# Patient Record
Sex: MALE | Race: WHITE | NOT HISPANIC OR LATINO | Employment: OTHER | URBAN - METROPOLITAN AREA
[De-identification: names, ages, dates, MRNs, and addresses within clinical notes are randomized per-mention and may not be internally consistent; named-entity substitution may affect disease eponyms.]

---

## 2017-04-25 ENCOUNTER — LAB CONVERSION - ENCOUNTER (OUTPATIENT)
Dept: OTHER | Facility: OTHER | Age: 67
End: 2017-04-25

## 2017-04-25 LAB
CHOLEST SERPL-MCNC: 189 MG/DL (ref 125–200)
CHOLEST/HDLC SERPL: 5.1 (CALC)
GLUCOSE, PLASMA (HISTORICAL): 96 MG/DL (ref 65–99)
HBA1C MFR BLD HPLC: 6.5 % OF TOTAL HGB
HDLC SERPL-MCNC: 37 MG/DL
LDL CHOLESTEROL (HISTORICAL): 107 MG/DL (CALC)
NON-HDL-CHOL (CHOL-HDL) (HISTORICAL): 152 MG/DL (CALC)
PROSTATE SPECIFIC ANTIGEN TOTAL (HISTORICAL): 0.8 NG/ML
TRIGL SERPL-MCNC: 226 MG/DL

## 2017-08-24 ENCOUNTER — ALLSCRIPTS OFFICE VISIT (OUTPATIENT)
Dept: OTHER | Facility: OTHER | Age: 67
End: 2017-08-24

## 2017-08-24 DIAGNOSIS — E11.9 TYPE 2 DIABETES MELLITUS WITHOUT COMPLICATIONS (HCC): ICD-10-CM

## 2017-08-24 DIAGNOSIS — E78.5 HYPERLIPIDEMIA: ICD-10-CM

## 2017-09-06 ENCOUNTER — LAB CONVERSION - ENCOUNTER (OUTPATIENT)
Dept: OTHER | Facility: OTHER | Age: 67
End: 2017-09-06

## 2017-09-06 LAB
GLUCOSE, PLASMA (HISTORICAL): 113 MG/DL (ref 65–99)
HBA1C MFR BLD HPLC: 6.7 % OF TOTAL HGB

## 2017-09-15 ENCOUNTER — GENERIC CONVERSION - ENCOUNTER (OUTPATIENT)
Dept: OTHER | Facility: OTHER | Age: 67
End: 2017-09-15

## 2017-10-06 ENCOUNTER — GENERIC CONVERSION - ENCOUNTER (OUTPATIENT)
Dept: OTHER | Facility: OTHER | Age: 67
End: 2017-10-06

## 2017-11-17 ENCOUNTER — ALLSCRIPTS OFFICE VISIT (OUTPATIENT)
Dept: OTHER | Facility: OTHER | Age: 67
End: 2017-11-17

## 2017-11-17 LAB
BILIRUB UR QL STRIP: NORMAL
CLARITY UR: NORMAL
COLOR UR: YELLOW
GLUCOSE (HISTORICAL): 1000
HGB UR QL STRIP.AUTO: NORMAL
KETONES UR STRIP-MCNC: NORMAL MG/DL
LEUKOCYTE ESTERASE UR QL STRIP: NORMAL
NITRITE UR QL STRIP: NORMAL
PH UR STRIP.AUTO: 6 [PH]
PROT UR STRIP-MCNC: NORMAL MG/DL
SP GR UR STRIP.AUTO: 1.01
UROBILINOGEN UR QL STRIP.AUTO: NORMAL

## 2017-11-18 NOTE — PROGRESS NOTES
Assessment    1  Diabetes (250 00) (E11 9)   2  Benign essential hypertension (401 1) (I10)    Plan  Benign essential hypertension    · Follow-up visit in 2 weeks Evaluation and Treatment  Follow-up  Status: Hold For -Scheduling  Requested for: 96OAU8345  Frequent urination    · Urine Dip Non-Automated- POC; Status:Complete - Retrospective By ProtocolAuthorization;   Done: 72OKU3616 10:45AM    Discussion/Summary    1  Diabetes mellitus type 2 with polydipsia, polyuria, weight loss  Again patient states he has not been following his diet at all and is indulging in a lot of sweets including cake, pies, Candy, ice cream  He was told the importance of reducing his consumption concentrated sweets and simple carbohydrates  We did review his medication and apparently he is not taking his metformin twice a day as previously directed  Patient is also not checking his blood sugars on a regular basis  Instructions are for him to check his blood sugars twice a day in the a m  fasting and again before dinner and record the results  He was told to call if any blood sugars were greater than 300  We discussed modification of his diet and increasing his metformin to twice a day as previously directed  He will return to the office in 2 weeks with a log of his blood sugars so that we can determine whether or not we need to modify his medication  Again he was told to call with any elevated blood sugar readings  Patient and his wife understand the instructions  2  Hypertension  Controlled with present treatment  The patient, patient's family was counseled regarding diagnostic results,-- instructions for management,-- risk factor reductions,-- prognosis,-- patient and family education,-- impressions,-- risks and benefits of treatment options,-- importance of compliance with treatment  Possible side effects of new medications were reviewed with the patient/guardian today  The treatment plan was reviewed with the patient/guardian   The patient/guardian understands and agrees with the treatment plan      Chief Complaint  Patient is here today for frequent urination      History of Present Illness  HPI: Patient is a 21-year-old male with a history of benign essential hypertension, hyperlipidemia, diabetes mellitus type 2  Patient is here today complaining of problems over the past 2 weeks of increased urination, increased thirst  Patient states he is getting up in the middle of the night at least 5-6 times to urinate  He has no dysuria or abdominal pain, back pain, hematuria  He states that his urine is light in color  Also of note he further relates that he has not been watching his diet and he has lost 5 lb over the past few weeks  Emergently we did check his urine in the office today and it showed a large amount of glucose  He is not checking his sugars at home on a regular basis and he states he does not recall what the values are when he checks his glucometer readings  He denies any fever or chills or other systemic problems  Hospital Based Practices Required Assessment:  Pain Assessment  the patient states they do not have pain  Abuse And Domestic Violence Screen   Yes, the patient is safe at home  -- The patient states no one is hurting them  Depression And Suicide Screen  No, the patient has not had thoughts of hurting themself  No, the patient has not felt depressed in the past 7 days  Primary Language is  English  Readiness To Learn: Receptive  Barriers To Learning: none  Preferred Learning: verbal  Education Completed: disease/condition,-- medications-- and-- further treatment/follow-up  Person Taught: patient-- and-- family member   Evaluation Of Learning: verbalized/demonstrated understanding      Review of Systems   Constitutional: feeling tired-- and-- recent 5 lb weight loss since last visit lb weight loss, but-- no fever,-- not feeling poorly,-- no recent weight gain-- and-- no chills    ENT: Dry mouth, but-- no complaints of earache, no loss of hearing, no nosebleeds or nasal discharge, no sore throat or hoarseness  Cardiovascular: no complaints of slow or fast heart rate, no chest pain, no palpitations, no leg claudication or lower extremity edema  Respiratory: no complaints of shortness of breath, no wheezing or cough, no dyspnea on exertion, no orthopnea or PND  Gastrointestinal: no complaints of abdominal pain, no constipation, no nausea or vomiting, no diarrhea or bloody stools  Genitourinary: nocturia-- and-- Urinary frequency, but-- no dysuria,-- no urinary hesitancy,-- no genital lesions,-- no incontinence-- and-- no inadequacy of penile erections  Musculoskeletal: no complaints of arthralgia, no myalgia, no joint swelling or stiffness, no limb pain or swelling  Integumentary: no complaints of skin rash or lesion, no itching or dry skin, no skin wounds  Neurological: no complaints of headache, no confusion, no numbness or tingling, no dizziness or fainting  Active Problems  1  Acute bronchitis (466 0) (J20 9)   2  Anxiety (300 00) (F41 9)   3  Atopic dermatitis (691 8) (L20 9)   4  Atypical chest pain (786 59) (R07 89)   5  Benign essential hypertension (401 1) (I10)   6  Controlled diabetes mellitus (250 00) (E11 9)   7  Diabetes (250 00) (E11 9)   8  Encounter for prostate cancer screening (V76 44) (Z12 5)   9  Hyperlipidemia (272 4) (E78 5)   10  Impacted cerumen of both ears (380 4) (H61 23)   11  Need for immunization against influenza (V04 81) (Z23)   12  Need for pneumococcal vaccine (V03 82) (Z23)   13  Need for prophylactic vaccination and inoculation against influenza (V04 81) (Z23)   14  Osteoarthritis of knee (715 36) (M17 10)   15  Screening for colorectal cancer (V76 51) (Z12 11,Z12 12)    Past Medical History  Active Problems And Past Medical History Reviewed: The active problems and past medical history were reviewed and updated today  Surgical History  Surgical History Reviewed:    The surgical history was reviewed and updated today  Social History     · Never A Smoker  The social history was reviewed and updated today  The social history was reviewed and is unchanged  Family History  Family History Reviewed: The family history was reviewed and updated today  Current Meds   1  Aspirin 81 MG Oral Tablet Chewable; CHEW AND SWALLOW 1 TABLET DAILY; Therapy: 99DEL1556 to Recorded   2  Christina Contour Next Test In [employer]; TEST TWICE DAILY; Therapy: 64NPG7138 to (([phone])  Requested for: 30NJE4948; Last Rx:06Oct2017 Ordered   3  Bisoprolol-Hydrochlorothiazide 5-6 25 MG Oral Tablet; Take 1 tablet daily; Therapy: 79VMR5533 to (Evaluate:17Mar2017)  Requested for: 22Mar2016; Last Rx:22Mar2016 Ordered   4  Citalopram Hydrobromide 10 MG Oral Tablet; Take 1 tablet daily; Therapy: 34Pfe6537 to (Evaluate:69Ddl3175)  Requested for: 56Shw2593; Last Rx:49Hlh5393 Ordered   5  Fenofibrate 145 MG Oral Tablet; Take 1 tablet daily; Therapy: 47Pfh5997 to (Last Claudeen Eb)  Requested for: 01Vma8284 Ordered   6  Glimepiride 2 MG Oral Tablet; TAKE 1 TABLET DAILY AS DIRECTED; Therapy: 37IQT9651 to (Evaluate:51Xfy6921)  Requested for: 12UNH3262; Last Rx:84Gsn1728 Ordered   7  Lancets Miscellaneous; PATIENT TEST TWICE DAILY; Therapy: 37IOT3100 to (Last Rx:06Oct2017)  Requested for: 63KEH3244 Ordered   8  Losartan Potassium 50 MG Oral Tablet; Take 1 tablet daily; Therapy: 51SWI2093 to (Jacqueline Alberts)  Requested for: 52QLE1285; Last Rx:05Jun2017 Ordered   9  MetFORMIN HCl  MG Oral Tablet Extended Release 24 Hour; Take 1 tablet twice a day; Therapy: 14DFK8699 to (Last Rx:19Oct2016)  Requested for: 19Oct2016 Ordered    The medication list was reviewed and updated today  Allergies  1   No Known Drug Allergies    Vitals   Recorded: 17SAF1445 10:34AM   Temperature 98 F   Heart Rate 72   Systolic 856   Diastolic 82   Height 5 ft 11 in   Weight 212 lb 6 oz   BMI Calculated 29 62   BSA Calculated 2 16   O2 Saturation 96       Physical Exam   Constitutional Pleasant, cheerful 72-year-old male who is awake alert no acute distress  Eyes  Conjunctiva and lids: No swelling, erythema, or discharge  Pupils and irises: Equal, round and reactive to light  Ears, Nose, Mouth, and Throat  External inspection of ears and nose: Normal    Otoscopic examination: Tympanic membrance translucent with normal light reflex  Canals patent without erythema  Nasal mucosa, septum, and turbinates: Normal without edema or erythema  Oropharynx: Abnormal  -- Slightly dry oral mucosa  Pulmonary  Respiratory effort: No increased work of breathing or signs of respiratory distress  Auscultation of lungs: Clear to auscultation, equal breath sounds bilaterally, no wheezes, no rales, no rhonci  Cardiovascular  Palpation of heart: Normal PMI, no thrills  Auscultation of heart: Normal rate and rhythm, normal S1 and S2, without murmurs  Examination of extremities for edema and/or varicosities: Normal    Carotid pulses: Normal    Abdomen  Abdomen: Abnormal  -- Mildly obese soft nontender positive bowel sounds x4 quadrants  Liver and spleen: No hepatomegaly or splenomegaly  Lymphatic  Palpation of lymph nodes in neck: No lymphadenopathy  Musculoskeletal  Gait and station: Normal    Digits and nails: Normal without clubbing or cyanosis  Inspection/palpation of joints, bones, and muscles: Normal    Skin  Skin and subcutaneous tissue: Normal without rashes or lesions  Neurologic  Cranial nerves: Cranial nerves 2-12 intact  Reflexes: 2+ and symmetric  Sensation: No sensory loss  Psychiatric  Orientation to person, place and time: Normal    Mood and affect: Abnormal  -- Cheerful, mood today  Diabetic Foot Screen: Normal    Socks and shoes removed, the Right Foot: the foot was normal, no swelling, no erythema  The toes on the right were normal-- and-- with full range of motion  The sensory exam showed   normal vibratory sensation at the level of the toes on the right  Normal tactile sensation with monofilament testing throughout the right foot  Socks and shoes removed, Left Foot: the foot was normal, no swelling, no erythema  The toes on the left were normal-- and-- with full range of motion  The sensory exam showed  normal vibratory sensation at the level of the toes on the left , Normal tactile sensation with monofilament testing throughout the left foot  Pulses:  2+ in the dorsalis pedis on the right  Pulses:  2+ in the dorsalis pedis on the left        Results/Data  Urine Dip Non-Automated- POC 72QXG9532 10:45AM Escobar Cortez     Test Name Result Flag Reference   Color Yellow       Clarity Transparent     Leukocytes -     Nitrite -     Blood -     Bilirubin -     Urobilinogen -     Protein -     Ph 6     Specific Gravity 1 010     Ketone -     Glucose 1000         Future Appointments    Date/Time Provider Specialty Site   11/30/2017 08:00 AM Escobar Cortez DO Internal Medicine [facility] INTERNAL MED   01/15/2018 01:45 PM Escobar Cortez DO Internal Medicine [facility] INTERNAL MED       Signatures   Electronically signed by : Rachel Pearce DO; Nov 17 2017 11:41AM EST                       (Author)

## 2017-11-30 ENCOUNTER — GENERIC CONVERSION - ENCOUNTER (OUTPATIENT)
Dept: OTHER | Facility: OTHER | Age: 67
End: 2017-11-30

## 2017-12-29 ENCOUNTER — GENERIC CONVERSION - ENCOUNTER (OUTPATIENT)
Dept: OTHER | Facility: OTHER | Age: 67
End: 2017-12-29

## 2018-01-12 ENCOUNTER — LAB CONVERSION - ENCOUNTER (OUTPATIENT)
Dept: OTHER | Facility: OTHER | Age: 68
End: 2018-01-12

## 2018-01-12 LAB
GLUCOSE, PLASMA (HISTORICAL): 167 MG/DL (ref 65–99)
HBA1C MFR BLD HPLC: 10.2 % OF TOTAL HGB

## 2018-01-14 VITALS
SYSTOLIC BLOOD PRESSURE: 140 MMHG | TEMPERATURE: 98 F | HEART RATE: 72 BPM | DIASTOLIC BLOOD PRESSURE: 82 MMHG | WEIGHT: 212.38 LBS | HEIGHT: 71 IN | BODY MASS INDEX: 29.73 KG/M2 | OXYGEN SATURATION: 96 %

## 2018-01-15 ENCOUNTER — GENERIC CONVERSION - ENCOUNTER (OUTPATIENT)
Dept: OTHER | Facility: OTHER | Age: 68
End: 2018-01-15

## 2018-01-15 DIAGNOSIS — Z12.5 ENCOUNTER FOR SCREENING FOR MALIGNANT NEOPLASM OF PROSTATE: ICD-10-CM

## 2018-01-15 DIAGNOSIS — E11.9 TYPE 2 DIABETES MELLITUS WITHOUT COMPLICATIONS (HCC): ICD-10-CM

## 2018-01-15 NOTE — PROGRESS NOTES
Assessment    1  Controlled diabetes mellitus (250 00) (E11 9)   2  Benign essential hypertension (401 1) (I10)   3  Hyperlipidemia (272 4) (E78 5)   4  Anxiety (300 00) (F41 9)    Plan  Anxiety    · Citalopram Hydrobromide 10 MG Oral Tablet; Take 1 tablet daily   · Follow-up visit in 3 weeks Evaluation and Treatment  Follow-up  Status: Hold For - Scheduling   Requested for: 45Lbn6771  Controlled diabetes mellitus    · (1) GLUCOSE,  FASTING; Status:Active; Requested for:39Etj0745;    · (1) HEMOGLOBIN A1C; Status:Active; Requested for:31Krj8307;    · *VB - Foot Exam; Status:Active; Requested for:85Ays6776;   Hyperlipidemia    · Fenofibrate 145 MG Oral Tablet; Take 1 tablet daily    Discussion/Summary  Discussion Summary:   #1  Diabetes mellitus type 2  As noted patient since he is retired is working harder on his diet and exercise  He is not lost any significant weight since his last visit  Patient was given a slip to check on a fasting blood sugar, hemoglobin A1c and we will discuss the results when he returns to the office in 3 months  #2  Hypertension  Patient's blood pressures controlled and patient will continue with present medication  #3  Hyperlipidemia  Patient states he's been working very hard on reducing fats and cholesterol in his diet  Patient states he does not want to have a lipid profile performed because with his last blood tests was extremely expensive as far as his co-pay was concerned  We will discuss having these tests done approximately 3-4 months  #4  Anxiety disorder   Patient states since he is retired and is able to spend more time with his wife he is less anxious than in the past  We will discuss with him possibly weaning off the citalopram    Counseling Documentation With Imm: The patient was counseled regarding diagnostic results, instructions for management, risk factor reductions, prognosis, patient and family education, impressions, risks and benefits of treatment options, importance of compliance with treatment  Medication SE Review and Pt Understands Tx: Possible side effects of new medications were reviewed with the patient/guardian today  The treatment plan was reviewed with the patient/guardian  The patient/guardian understands and agrees with the treatment plan      Chief Complaint  Chief Complaint Free Text Note Form: Patient is here today for follow-up   Chief Complaint Chronic Condition [facility]: Patient is here today for follow up of chronic conditions described in HPI  History of Present Illness  HPI: Patient is a 58-year-old male with a history of diabetes mellitus type 2, hypertension, hyperlipidemia  Patient is here today for routine follow-up  Patient states he's enjoying shelter and is getting spend more time with his wife  He states he's been trying hard to watch his diet closely and he's unable to afford the medication was prescribed previously because now he is on a Medicare prescription plan  He has not had his sugar checked in quite a while  He states that since his shelter he is on an regular exercise program and walks on a daily basis  He denies chest pain or pressure and no shortness of breath with exertion  Patient has no new arthritic aches or pains      Review of Systems  Complete-Male:   Constitutional: no fever, not feeling poorly, no recent weight gain, no chills, not feeling tired and no recent weight loss  Eyes: No complaints of eye pain, no red eyes, no discharge from eyes, no itchy eyes  ENT: no complaints of earache, no hearing loss, no nosebleeds, no nasal discharge, no sore throat, no hoarseness  Cardiovascular: No complaints of slow heart rate, no fast heart rate, no chest pain, no palpitations, no leg claudication, no lower extremity  Respiratory: No complaints of shortness of breath, no wheezing, no cough, no SOB on exertion, no orthopnea or PND     Gastrointestinal: No complaints of abdominal pain, no constipation, no nausea or vomiting, no diarrhea or bloody stools  Genitourinary: No complaints of dysuria, no incontinence, no hesitancy, no nocturia, no genital lesion, no testicular pain  Musculoskeletal: No complaints of arthralgia, no myalgias, no joint swelling or stiffness, no limb pain or swelling  Integumentary: No complaints of skin rash or skin lesions, no itching, no skin wound, no dry skin  Neurological: No compliants of headache, no confusion, no convulsions, no numbness or tingling, no dizziness or fainting, no limb weakness, no difficulty walking  Psychiatric: sleep disturbances and He admits to some sleep disturbance secondary to taking care of his wife's medical needs, but not suicidal, no anxiety, no personality change, no depression and no emotional problems  Endocrine: No complaints of proptosis, no hot flashes, no muscle weakness, no erectile dysfunction, no deepening of the voice, no feelings of weakness  Hematologic/Lymphatic: No complaints of swollen glands, no swollen glands in the neck, does not bleed easily, no easy bruising  ROS Reviewed:   ROS reviewed  Active Problems    1  Acute bronchitis (466 0) (J20 9)   2  Anxiety (300 00) (F41 9)   3  Atopic dermatitis (691 8) (L20 9)   4  Benign essential hypertension (401 1) (I10)   5  Controlled diabetes mellitus (250 00) (E11 9)   6  Diabetes (250 00) (E11 9)   7  Encounter for prostate cancer screening (V76 44) (Z12 5)   8  Hyperlipidemia (272 4) (E78 5)   9  Impacted cerumen of both ears (380 4) (H61 23)   10  Need for immunization against influenza (V04 81) (Z23)   11  Need for pneumococcal vaccine (V03 82) (Z23)   12  Need for prophylactic vaccination and inoculation against influenza (V04 81) (Z23)   13  Osteoarthritis of knee (715 36) (M17 10)   14  Screening for colorectal cancer (V76 51) (Z12 11,Z12 12)    Past Medical History    1   Need for prophylactic vaccination and inoculation against influenza (V04 81) (Z23)    Social History    · Never A Smoker    Current Meds   1  Aspirin 81 MG Oral Tablet Chewable; CHEW AND SWALLOW 1 TABLET DAILY; Therapy: 51BEA9913 to Recorded   2  Bisoprolol-Hydrochlorothiazide 5-6 25 MG Oral Tablet; Take 1 tablet daily; Therapy: 63KHJ9738 to (Evaluate:17Mar2017)  Requested for: 22Mar2016; Last Rx:22Mar2016   Ordered   3  Citalopram Hydrobromide 10 MG Oral Tablet; Take 1 tablet daily; Therapy: 66PML6980 to (Evaluate:14Oct2017)  Requested for: 13PJY1882; Last Rx:19Oct2016   Ordered   4  Fenofibrate 145 MG Oral Tablet; Take 1 tablet daily; Therapy: 60MNU0157 to (Evaluate:14Oct2017)  Requested for: 43QDL4253; Last Rx:20Oct2016   Ordered   5  Glimepiride 2 MG Oral Tablet; TAKE 1 TABLET DAILY AS DIRECTED; Therapy: 71GKW8260 to (Evaluate:61Bac2512)  Requested for: 09QCY3499; Last Rx:30Lzh8097 Ordered   6  Losartan Potassium 50 MG Oral Tablet; Take 1 tablet daily; Therapy: 33PWD2869 to (Era )  Requested for: 73ORM2367; Last Rx:05Jun2017   Ordered   7  MetFORMIN HCl  MG Oral Tablet Extended Release 24 Hour; Take 1 tablet twice a day; Therapy: 92GNW0008 to (Last Rx:19Oct2016)  Requested for: 19Oct2016 Ordered    Allergies    1  No Known Drug Allergies    Vitals  Vital Signs    Recorded: 49Gav5402 04:11PM   Temperature 97 3 F   Heart Rate 69   Respiration 16   Systolic 239   Diastolic 80   Height 5 ft 11 in   Weight 215 lb 4 0 oz   BMI Calculated 30 02   BSA Calculated 2 18   O2 Saturation 96     Physical Exam    Constitutional Pleasant, cheerful 52-year-old male who is awake alert no acute distress  Eyes   Conjunctiva and lids: No swelling, erythema, or discharge  Pupils and irises: Equal, round and reactive to light  Ears, Nose, Mouth, and Throat   External inspection of ears and nose: Normal     Otoscopic examination: Tympanic membrance translucent with normal light reflex  Canals patent without erythema  Nasal mucosa, septum, and turbinates: Normal without edema or erythema  Oropharynx: Normal with no erythema, edema, exudate or lesions  Pulmonary   Respiratory effort: No increased work of breathing or signs of respiratory distress  Auscultation of lungs: Clear to auscultation, equal breath sounds bilaterally, no wheezes, no rales, no rhonci  Cardiovascular   Palpation of heart: Normal PMI, no thrills  Auscultation of heart: Normal rate and rhythm, normal S1 and S2, without murmurs  Examination of extremities for edema and/or varicosities: Normal     Carotid pulses: Normal     Abdomen   Abdomen: Abnormal   Mildly obese soft nontender positive bowel sounds x4 quadrants  Liver and spleen: No hepatomegaly or splenomegaly  Lymphatic   Palpation of lymph nodes in neck: No lymphadenopathy  Musculoskeletal   Gait and station: Normal     Digits and nails: Normal without clubbing or cyanosis  Inspection/palpation of joints, bones, and muscles: Normal     Skin   Skin and subcutaneous tissue: Normal without rashes or lesions  Neurologic   Cranial nerves: Cranial nerves 2-12 intact  Reflexes: 2+ and symmetric  Sensation: No sensory loss  Psychiatric   Orientation to person, place and time: Normal     Mood and affect: Abnormal   Cheerful, mood today  Diabetic Foot Screen: Normal     Socks and shoes removed, the Right Foot: the foot was normal, no swelling, no erythema  The toes on the right were normal and with full range of motion  The sensory exam showed  normal vibratory sensation at the level of the toes on the right  Normal tactile sensation with monofilament testing throughout the right foot  Socks and shoes removed, Left Foot: the foot was normal, no swelling, no erythema  The toes on the left were normal and with full range of motion  The sensory exam showed  normal vibratory sensation at the level of the toes on the left , Normal tactile sensation with monofilament testing throughout the left foot     Pulses:   2+ in the dorsalis pedis on the right    Pulses:   2+ in the dorsalis pedis on the left  Health Management  Controlled diabetes mellitus   *VB - Eye Exam; every 1 year; Last 41BWX2808; Next Due: 24OXY0240; Active  Screening for colorectal cancer   COLONOSCOPY; every 10 years; Next Due: 01Vtq0422;  Overdue    Future Appointments    Date/Time Provider Specialty Site   09/15/2017 02:30 PM Malaika Mccann DO Internal Medicine [facility] INTERNAL MED     Signatures   Electronically signed by : Jessie Ambriz DO; Aug 24 2017  5:47PM EST                       (Author)

## 2018-01-22 VITALS
SYSTOLIC BLOOD PRESSURE: 138 MMHG | TEMPERATURE: 97.7 F | BODY MASS INDEX: 30.38 KG/M2 | HEIGHT: 71 IN | HEART RATE: 63 BPM | WEIGHT: 217 LBS | OXYGEN SATURATION: 97 % | DIASTOLIC BLOOD PRESSURE: 92 MMHG

## 2018-01-22 VITALS
SYSTOLIC BLOOD PRESSURE: 136 MMHG | BODY MASS INDEX: 30.29 KG/M2 | DIASTOLIC BLOOD PRESSURE: 82 MMHG | TEMPERATURE: 98.1 F | WEIGHT: 216.38 LBS | HEART RATE: 59 BPM | OXYGEN SATURATION: 97 % | HEIGHT: 71 IN

## 2018-01-22 VITALS
HEART RATE: 60 BPM | OXYGEN SATURATION: 96 % | TEMPERATURE: 96.8 F | SYSTOLIC BLOOD PRESSURE: 122 MMHG | WEIGHT: 210.5 LBS | BODY MASS INDEX: 29.47 KG/M2 | HEIGHT: 71 IN | DIASTOLIC BLOOD PRESSURE: 80 MMHG

## 2018-01-22 VITALS
DIASTOLIC BLOOD PRESSURE: 80 MMHG | RESPIRATION RATE: 16 BRPM | HEIGHT: 71 IN | HEART RATE: 69 BPM | SYSTOLIC BLOOD PRESSURE: 140 MMHG | OXYGEN SATURATION: 96 % | BODY MASS INDEX: 30.14 KG/M2 | WEIGHT: 215.25 LBS | TEMPERATURE: 97.3 F

## 2018-01-24 VITALS
TEMPERATURE: 97.8 F | BODY MASS INDEX: 29.68 KG/M2 | OXYGEN SATURATION: 97 % | WEIGHT: 212 LBS | HEIGHT: 71 IN | SYSTOLIC BLOOD PRESSURE: 152 MMHG | HEART RATE: 65 BPM | DIASTOLIC BLOOD PRESSURE: 94 MMHG

## 2018-01-24 VITALS
TEMPERATURE: 97.5 F | HEART RATE: 62 BPM | WEIGHT: 213 LBS | DIASTOLIC BLOOD PRESSURE: 82 MMHG | SYSTOLIC BLOOD PRESSURE: 136 MMHG | BODY MASS INDEX: 29.82 KG/M2 | HEIGHT: 71 IN | OXYGEN SATURATION: 98 %

## 2018-05-01 LAB
ALBUMIN SERPL-MCNC: 4.3 G/DL (ref 3.6–5.1)
ALBUMIN/CREAT UR: 8 MCG/MG CREAT
ALBUMIN/GLOB SERPL: 1.7 (CALC) (ref 1–2.5)
ALP SERPL-CCNC: 65 U/L (ref 40–115)
ALT SERPL-CCNC: 19 U/L (ref 9–46)
AST SERPL-CCNC: 13 U/L (ref 10–35)
BASOPHILS # BLD AUTO: 51 CELLS/UL (ref 0–200)
BASOPHILS NFR BLD AUTO: 0.8 %
BILIRUB SERPL-MCNC: 0.5 MG/DL (ref 0.2–1.2)
BUN SERPL-MCNC: 15 MG/DL (ref 7–25)
BUN/CREAT SERPL: ABNORMAL (CALC) (ref 6–22)
CALCIUM SERPL-MCNC: 9.3 MG/DL (ref 8.6–10.3)
CHLORIDE SERPL-SCNC: 100 MMOL/L (ref 98–110)
CHOLEST SERPL-MCNC: 229 MG/DL
CHOLEST/HDLC SERPL: 7.4 (CALC)
CO2 SERPL-SCNC: 29 MMOL/L (ref 20–31)
CREAT SERPL-MCNC: 0.98 MG/DL (ref 0.7–1.25)
CREAT UR-MCNC: 240 MG/DL (ref 20–370)
EOSINOPHIL # BLD AUTO: 102 CELLS/UL (ref 15–500)
EOSINOPHIL NFR BLD AUTO: 1.6 %
ERYTHROCYTE [DISTWIDTH] IN BLOOD BY AUTOMATED COUNT: 14.1 % (ref 11–15)
GLOBULIN SER CALC-MCNC: 2.5 G/DL (CALC) (ref 1.9–3.7)
GLUCOSE SERPL-MCNC: 162 MG/DL (ref 65–99)
HBA1C MFR BLD: 8.8 % OF TOTAL HGB
HCT VFR BLD AUTO: 50.9 % (ref 38.5–50)
HDLC SERPL-MCNC: 31 MG/DL
HGB BLD-MCNC: 17 G/DL (ref 13.2–17.1)
LDLC SERPL CALC-MCNC: 139 MG/DL (CALC)
LYMPHOCYTES # BLD AUTO: 1562 CELLS/UL (ref 850–3900)
LYMPHOCYTES NFR BLD AUTO: 24.4 %
MCH RBC QN AUTO: 28.2 PG (ref 27–33)
MCHC RBC AUTO-ENTMCNC: 33.4 G/DL (ref 32–36)
MCV RBC AUTO: 84.6 FL (ref 80–100)
MICROALBUMIN UR-MCNC: 2 MG/DL
MONOCYTES # BLD AUTO: 774 CELLS/UL (ref 200–950)
MONOCYTES NFR BLD AUTO: 12.1 %
NEUTROPHILS # BLD AUTO: 3910 CELLS/UL (ref 1500–7800)
NEUTROPHILS NFR BLD AUTO: 61.1 %
NONHDLC SERPL-MCNC: 198 MG/DL (CALC)
PLATELET # BLD AUTO: 144 THOUSAND/UL (ref 140–400)
PMV BLD REES-ECKER: 10.6 FL (ref 7.5–12.5)
POTASSIUM SERPL-SCNC: 4.5 MMOL/L (ref 3.5–5.3)
PROT SERPL-MCNC: 6.8 G/DL (ref 6.1–8.1)
PSA SERPL-MCNC: 0.7 NG/ML
RBC # BLD AUTO: 6.02 MILLION/UL (ref 4.2–5.8)
SL AMB EGFR AFRICAN AMERICAN: 93 ML/MIN/1.73M2
SL AMB EGFR NON AFRICAN AMERICAN: 80 ML/MIN/1.73M2
SODIUM SERPL-SCNC: 137 MMOL/L (ref 135–146)
TRIGL SERPL-MCNC: 384 MG/DL
TSH SERPL-ACNC: 1.07 MIU/L (ref 0.4–4.5)
WBC # BLD AUTO: 6.4 THOUSAND/UL (ref 3.8–10.8)

## 2018-05-22 ENCOUNTER — OFFICE VISIT (OUTPATIENT)
Dept: INTERNAL MEDICINE CLINIC | Facility: CLINIC | Age: 68
End: 2018-05-22
Payer: MEDICARE

## 2018-05-22 VITALS
OXYGEN SATURATION: 98 % | BODY MASS INDEX: 31.08 KG/M2 | TEMPERATURE: 98.5 F | HEART RATE: 68 BPM | HEIGHT: 71 IN | DIASTOLIC BLOOD PRESSURE: 74 MMHG | SYSTOLIC BLOOD PRESSURE: 132 MMHG | WEIGHT: 222 LBS

## 2018-05-22 DIAGNOSIS — I10 ESSENTIAL HYPERTENSION: ICD-10-CM

## 2018-05-22 DIAGNOSIS — F41.9 ANXIETY: ICD-10-CM

## 2018-05-22 DIAGNOSIS — E11.9 TYPE 2 DIABETES MELLITUS WITHOUT COMPLICATION, WITHOUT LONG-TERM CURRENT USE OF INSULIN (HCC): Primary | ICD-10-CM

## 2018-05-22 DIAGNOSIS — E78.01 FAMILIAL HYPERCHOLESTEROLEMIA: ICD-10-CM

## 2018-05-22 DIAGNOSIS — Z00.00 MEDICARE ANNUAL WELLNESS VISIT, SUBSEQUENT: ICD-10-CM

## 2018-05-22 PROCEDURE — G0439 PPPS, SUBSEQ VISIT: HCPCS | Performed by: INTERNAL MEDICINE

## 2018-05-22 RX ORDER — CITALOPRAM 10 MG/1
10 TABLET ORAL DAILY
Qty: 90 TABLET | Refills: 3 | Status: SHIPPED | OUTPATIENT
Start: 2018-05-22 | End: 2018-08-29 | Stop reason: SDUPTHER

## 2018-05-22 RX ORDER — LOSARTAN POTASSIUM 50 MG/1
50 TABLET ORAL DAILY
Refills: 2 | COMMUNITY
Start: 2018-02-25 | End: 2018-08-29 | Stop reason: SDUPTHER

## 2018-05-22 RX ORDER — FENOFIBRATE 145 MG/1
145 TABLET, COATED ORAL DAILY
Qty: 90 TABLET | Refills: 3 | Status: SHIPPED | OUTPATIENT
Start: 2018-05-22 | End: 2019-06-18 | Stop reason: SDUPTHER

## 2018-05-22 RX ORDER — PIOGLITAZONEHYDROCHLORIDE 15 MG/1
15 TABLET ORAL DAILY
Qty: 30 TABLET | Refills: 3 | Status: SHIPPED | OUTPATIENT
Start: 2018-05-22 | End: 2018-08-29

## 2018-05-22 RX ORDER — LOSARTAN POTASSIUM 50 MG/1
50 TABLET ORAL DAILY
Qty: 90 TABLET | Refills: 3 | Status: SHIPPED | OUTPATIENT
Start: 2018-05-22 | End: 2018-08-29 | Stop reason: SDUPTHER

## 2018-05-22 RX ORDER — FENOFIBRATE 145 MG/1
1 TABLET, COATED ORAL DAILY
COMMUNITY
Start: 2017-08-24 | End: 2018-08-29 | Stop reason: SDUPTHER

## 2018-05-22 RX ORDER — ASPIRIN 81 MG/1
1 TABLET, CHEWABLE ORAL DAILY
COMMUNITY
Start: 2012-12-06

## 2018-05-22 RX ORDER — CITALOPRAM 10 MG/1
10 TABLET ORAL DAILY
Refills: 3 | COMMUNITY
Start: 2018-04-03 | End: 2018-08-29 | Stop reason: SDUPTHER

## 2018-05-22 RX ORDER — BISOPROLOL FUMARATE AND HYDROCHLOROTHIAZIDE 5; 6.25 MG/1; MG/1
1 TABLET ORAL DAILY
Qty: 90 TABLET | Refills: 3 | Status: SHIPPED | OUTPATIENT
Start: 2018-05-22 | End: 2018-08-29 | Stop reason: SDUPTHER

## 2018-05-22 RX ORDER — METFORMIN HYDROCHLORIDE 500 MG/1
500 TABLET, EXTENDED RELEASE ORAL 2 TIMES DAILY WITH MEALS
Qty: 180 TABLET | Refills: 3 | Status: SHIPPED | OUTPATIENT
Start: 2018-05-22 | End: 2019-08-19 | Stop reason: SDUPTHER

## 2018-05-22 RX ORDER — METFORMIN HYDROCHLORIDE 500 MG/1
500 TABLET, EXTENDED RELEASE ORAL 2 TIMES DAILY
Refills: 3 | COMMUNITY
Start: 2018-04-16 | End: 2018-08-29 | Stop reason: SDUPTHER

## 2018-05-22 RX ORDER — LANCETS 30 GAUGE
EACH MISCELLANEOUS 2 TIMES DAILY
COMMUNITY
Start: 2017-10-06

## 2018-05-22 RX ORDER — BISOPROLOL FUMARATE AND HYDROCHLOROTHIAZIDE 5; 6.25 MG/1; MG/1
1 TABLET ORAL DAILY
Refills: 3 | COMMUNITY
Start: 2018-03-16 | End: 2019-06-18 | Stop reason: SDUPTHER

## 2018-05-22 NOTE — ASSESSMENT & PLAN NOTE
Healthcare maintenance  Patient is here today for Medicare wellness visit  Patient has a history of multiple medical problems as outlined previously including hypertension, hyperlipidemia, anxiety disorder, diabetes mellitus type 2  He did have labs performed prior to the visit today we did discuss the results  His sugar showing better control with his hemoglobin A1c is now down to 8 8 and patient is compliant with his medication but not with his diet  Patient blood pressure is under good control and his kidney and liver function are normal   We did discuss at length with patient today the need to work harder on his diet and weight control  He is up-to-date with routine screening including colon rectal examination, prostate screening  He will be returning to the office in approximately 3 months to recheck his blood sugar and hemoglobin A1c and further modification of medication as needed

## 2018-05-22 NOTE — ASSESSMENT & PLAN NOTE
Hypertension  Patient's blood pressure is controlled and patient will continue present medication    His renal function shows no compromise this time

## 2018-05-22 NOTE — ASSESSMENT & PLAN NOTE
Anxiety disorder  Patient states he still under a lot of stress with his wife's chronic illnesses    Patient will continue present treatment and he was told if any new emotional issues or problems to please call and we can adjust medication as needed

## 2018-05-22 NOTE — ASSESSMENT & PLAN NOTE
Diabetes  As noted patient's hemoglobin A1c and sugar control is not to goal   In order to improve his blood sugars we have added Actos 15 mg a day along with his metformin 500 mg twice a day with food  Again we stressed to the patient the importance of diet and limiting his intake of simple carbohydrates and concentrated sweets  We will check a fasting blood sugar and hemoglobin A1c in 3 months    Patient had a diabetic foot exam performed today and is up-to-date diabetic eye exam   His microalbumin was negative

## 2018-05-22 NOTE — PROGRESS NOTES
Diabetic Foot Exam    Patient's shoes and socks removed  Right Foot/Ankle   Right Foot Inspection  Skin Exam: skin normal and skin intact no dry skin, no warmth, no callus, no erythema, no maceration, no abnormal color, no pre-ulcer, no ulcer and no callus                          Toe Exam: ROM and strength within normal limitsno swelling, no tenderness, erythema and  no right toe deformity  Sensory   Vibration: intact  Proprioception: intact   Monofilament testing: intact      Left Foot/Ankle  Left Foot Inspection  Skin Exam: skin normal and skin intactno dry skin, no warmth, no erythema, no maceration, normal color, no pre-ulcer, no ulcer and no callus                         Toe Exam: ROM and strength within normal limitsno swelling, no tenderness, no erythema and no left toe deformity                   Sensory   Vibration: intact  Proprioception: intact  Monofilament: intact    Assign Risk Category:  No deformity present; No loss of protective sensation;  No weak pulses       Risk: 0

## 2018-05-22 NOTE — ASSESSMENT & PLAN NOTE
Hyperlipidemia  Patient is intolerant to statins and he is on Tricor 145 mg per day for control of his cholesterol  He did have a recent lipid profile performed showing adequate control    Patient will continue present medication and he was told he needs to watch his intake fats and cholesterol

## 2018-05-22 NOTE — PROGRESS NOTES
HPI:  Sandor Alexander  is a 78 y o  male here for his Subsequent Wellness Visit  There is no problem list on file for this patient  No past medical history on file  No past surgical history on file  No family history on file  History   Smoking Status    Never Smoker   Smokeless Tobacco    Never Used     History   Alcohol use Not on file      History   Drug use: Unknown     /74   Pulse 68   Temp 98 5 °F (36 9 °C)   Ht 5' 11" (1 803 m)   Wt 101 kg (222 lb)   SpO2 98%   BMI 30 96 kg/m²       Current Outpatient Prescriptions   Medication Sig Dispense Refill    aspirin 81 mg chewable tablet Chew 1 tablet daily      fenofibrate (TRICOR) 145 mg tablet Take 1 tablet by mouth daily      Lancets MISC by Does not apply route 2 (two) times a day      bisoprolol-hydrochlorothiazide (ZIAC) 5-6 25 MG per tablet Take 1 tablet by mouth daily  3    citalopram (CeleXA) 10 mg tablet Take 10 mg by mouth daily  3    losartan (COZAAR) 50 mg tablet Take 50 mg by mouth daily  2    metFORMIN (GLUCOPHAGE-XR) 500 mg 24 hr tablet 500 mg 2 (two) times a day  3     No current facility-administered medications for this visit        No Known Allergies  Immunization History   Administered Date(s) Administered    Influenza Quadrivalent Preservative Free 3 years and older IM 09/29/2014    Influenza Quadrivalent, 6-35 Months IM 09/18/2015    Influenza Split High Dose Preservative Free IM 10/19/2016, 09/15/2017    Influenza TIV (IM) 09/20/2012, 09/24/2013    Pneumococcal Conjugate 13-Valent 10/19/2016       Patient Care Team:  Griselda Villanueva DO as PCP - Gregoria Feliciano MD    Medicare Screening Tests and Risk Assessments:  AWV Clinical     ISAR:   Previous hospitalizations?:  Yes   How many hospitalizations have you had in the last year?:  1-2       Once in a Lifetime Medicare Screening:       Medicare Screening Tests and Risk Assessment:   AAA Risk Assessment    Osteoporosis Risk Assessment    HIV Risk Assessment        Drug and Alcohol Use:   Tobacco use    Cigarettes:  never smoker    Smokeless:  never used smokeless tobacco    Tobacco use duration    Tobacco Cessation Readiness    Alcohol use    Alcohol use:  occasional use    Alcohol Treatment Readiness   Illicit Drug Use    Drug use:  never        Diet & Exercise:   Diet   What is your diet?:  Regular   How many servings a day of the following:   Fruits and Vegetables:  1-2 Meat:  1-2   Whole Grains:  0, 1 Simple Carbs:  1   Dairy:  0, 1 Soda:  0   Coffee:  1 Tea:  0   Exercise    Do you currently exercise?:  currently not exercising       Cognitive Impairment Screening:   Depression screening preformed:  Yes Depression screen score:  0   Cognitive Impairment Screening    Do you have difficulty learning or retaining new information?:  No Do you have difficulty handling new tasks?:  No   Do you have difficulty with reasoning?:  No Do you have difficulty with spatial ability and orientation?:  No   Do you have difficulty with language?:  No Do you have difficulty with behavior?:  No       Functional Ability/Level of Safety:   Hearing    Hearing difficulties:  No    Hearing aid:  No    Hearing Impairment Assessment    Hearing status:  No impairment   Current Activities    Status:  unlimited driving, unlimited social activities, unlimited IADL's, unlimited ADL's   Help needed with the folllowing:    Using the phone:  No Transportation:  No   Shopping:  No Preparing Meals:  No   Doing Housework:  No Doing Laundry:  No   Managing Medications:  No Managing Money:  No   ADL    Feeding:  Independant   Oral hygiene and Facial grooming:  Independant   Bathing:  Independant   Upper Body Dressing:  Independant   Lower Body Dressing:  Independant   Toileting:  Independant   Bed Mobility:  Independant   Fall Risk   Have you fallen in the last 12 months?:  No Are you unsteady on your feet?:  No    Are you taking any medications that may cause fatigue or dizziness?:  No Do you have any chronic conditions that may contribute to a fall?:  Diabetes Do you rush to the bathroom potentially risking a fall?:  No   Injury History       Home Safety:   Are there hazards in your environment?:  No   If you fell, would you need help to get back up from the ground?:  Yes Do you have problems or concerns getting in/out of a bed, chair, tub, or toilet?:  No   Do you feel unsteady when walking?:  No Is your activity limited by pain?:  No   Do you have handrails and grab-bars in the home?:  Yes    Are you and/or family members aware of the dangers of smoking in bed?:  Yes Are firearms stored securely?:  Yes   Do you have working smoke alarms and fire extinguisher?:  Yes Do all household members know how to use them?:  Yes   Have you left the stove on unsupervised?:  Yes    Home Safety Risk Factors   Unfamilar with surroundings:  No Uneven floors:  No   Stairs or handrail saftey risk:  No Loose rugs:  No   Household clutter:  No Poor household lighting:  No   No grab bars in bathroom:  No Further evaluation needed:  No       Advanced Directives:   Advanced Directives    Living Will:  No Durable POA for healthcare:  No   Advanced directive:  No    Patient's End of Life Decisions        Urinary Incontinence:   Do you have urinary incontinence?:  No Do you have incomplete emptying?:  No   Do you urinate frequently?:  No Do you have urinary urgency?:  No   Do you have urinary hesitancy?:  No Do you have dysuria (painful and/or difficult urination)?:  No   Do you have nocturia (waking up to urinate)?:  No Do you strain when urinating (have to push to urinate)?:  No   Do you have a weak stream when urinating?:  No Do you have intermittent streaming when urinating?:  No       Glaucoma:            Provider Screening     Preventative Screening/Counseling:   Cardiovascular Screening/Counseling:   (Labs Q5 years, EKG optional one-time)   General:  Risks and Benefits Discussed, Screening Current Counseling: Healthy Diet, Healthy Weight, Improve Cholesterol, Improve Blood Pressure, Improve Exercise Tolerance          Diabetes Screening/Counseling:   (2 tests/year if Pre-Diabetes or 1 test/year if no Diabetes)   General:  Risks and Benefits Discussed, Screening Current Counseling:  Healthy Diet, Healthy Weight, Improve Physical Activity          Colorectal Cancer Screening/Counseling:   (FOBT Q1 yr; Flex Sig Q4 yrs or Q10 yrs after Screening Colonoscopy; Screening Colonoscpy Q2 yrs High Risk or Q10 yrs Low Risk; Barium Enema Q2 yrs High Risk or Q4 yrs Low Risk)   General:  Risks and Benefits Discussed, Screening Current           Prostate Cancer Screening/Counseling:   (Annual)    General:  Risks and Benefits Discussed, Screening Current          Breast Cancer Screening/Counseling:   (Baseline Age 28 - 43; Annual Age 36+)         Cervical Cancer Screening/Counseling:   (Annual for High Risk or Childbearing Age with Abnormal Pap in Last 3 yrs; Every 2 all others)         Osteoporosis Screening/Counseling:   (Every 2 Yrs if at risk or more if medically necessary)   General:  Risks and Benefits Discussed, Screening Not Indicated           AAA Screening/Counseling:   (Once per Lifetime with risk factors)    General:  Screening Not Indicated           Glaucoma Screening/Counseling:   (Annual)   General:  Risks and Benefits Discussed, Screening Current          HIV Screening/Counseling:   (Voluntary; Once annually for high risk OR 3 times for Pregnancy at diagnosis of IUP; 3rd trimester; and at Labor   General:  Screening Not Indicated           Hepatitis C Screening:             Immunizations:   Influenza (annual): Influenza UTD This Year   Pneumococcal (Once in a Lifetime):  Lifetime Vaccine Completed   Hepatitis B Series (low risk patients):  Series Not Indicated   Zostavax (Medicare D Coverage, Pt >70 yo):  Vaccine Status Unknown   Tdap (Non-Medicare Wellness Visit required):   Tdap Vaccine UTD       Other Preventative Couseling (Non-Medicare Wellness Visit Required):   nutrition counseling performed, alcohol use counseling provided, sunscreen education provided, weight reduction was discussed, Increased physical activity counseling given       Referrals (Non-Medicare Wellness Visit Required):       Medical Equipment/Suppliers:           No exam data present    Physical Exam :  Physical Exam   Constitutional: He is oriented to person, place, and time  He appears well-developed and well-nourished  No distress  Pleasant, mildly overweight 52-year-old male who is awake alert no acute distress and oriented x3   HENT:   Head: Normocephalic and atraumatic  Right Ear: External ear normal    Left Ear: External ear normal    Nose: Nose normal    Mouth/Throat: Oropharynx is clear and moist  No oropharyngeal exudate  Eyes: Conjunctivae and EOM are normal  Pupils are equal, round, and reactive to light  Right eye exhibits no discharge  Left eye exhibits no discharge  No scleral icterus  Neck: Normal range of motion  Neck supple  No JVD present  No tracheal deviation present  No thyromegaly present  Cardiovascular: Normal rate, regular rhythm, normal heart sounds and intact distal pulses  Exam reveals no gallop and no friction rub  No murmur heard  Pulmonary/Chest: Effort normal and breath sounds normal  No stridor  No respiratory distress  He has no wheezes  He has no rales  He exhibits no tenderness  Abdominal: Soft  Bowel sounds are normal  He exhibits no distension and no mass  There is no tenderness  There is no rebound and no guarding  No hernia  Genitourinary: Rectum normal, prostate normal and penis normal  Rectal exam shows guaiac negative stool  No penile tenderness  Musculoskeletal: He exhibits no edema, tenderness or deformity  Lymphadenopathy:     He has no cervical adenopathy  Neurological: He is alert and oriented to person, place, and time  He displays normal reflexes  No cranial nerve deficit   He exhibits normal muscle tone  Coordination normal    Skin: Capillary refill takes less than 2 seconds  No rash noted  He is not diaphoretic  No erythema  No pallor  Psychiatric: He has a normal mood and affect  His behavior is normal  Judgment and thought content normal    Nursing note and vitals reviewed  Reviewed Updated [facility] Prior Wellness Visits:   Last Medicare wellness visit information was reviewed, patient interviewed , no change since last AWVyes  Last Medicare wellness visit information was reviewed, patient interviewed and updates made to the record today yes    Assessment and Plan:  1   Medicare annual wellness visit, subsequent         Health Maintenance Due   Topic Date Due    Hepatitis C Screening  1950    COLONOSCOPY  1950    Depression Screening PHQ-9  06/17/1963    DTaP,Tdap,and Td Vaccines (1 - Tdap) 06/17/1972    PNEUMOCOCCAL POLYSACCHARIDE VACCINE AGE 72 AND OVER  06/17/2016    Diabetic Foot Exam  04/19/2017    OPHTHALMOLOGY EXAM  11/08/2017

## 2018-06-12 DIAGNOSIS — E11.9 TYPE 2 DIABETES MELLITUS WITHOUT COMPLICATION, WITHOUT LONG-TERM CURRENT USE OF INSULIN (HCC): Primary | ICD-10-CM

## 2018-06-12 RX ORDER — GLIMEPIRIDE 1 MG/1
TABLET ORAL
Qty: 30 TABLET | Refills: 5 | Status: SHIPPED | OUTPATIENT
Start: 2018-06-12 | End: 2019-07-10

## 2018-08-21 LAB
EST. AVERAGE GLUCOSE BLD GHB EST-MCNC: 275 (CALC)
EST. AVERAGE GLUCOSE BLD GHB EST-SCNC: 15.2 (CALC)
GLUCOSE P FAST SERPL-MCNC: 249 MG/DL (ref 65–99)
HBA1C MFR BLD: 11.2 % OF TOTAL HGB

## 2018-08-29 ENCOUNTER — OFFICE VISIT (OUTPATIENT)
Dept: INTERNAL MEDICINE CLINIC | Facility: CLINIC | Age: 68
End: 2018-08-29
Payer: MEDICARE

## 2018-08-29 VITALS
WEIGHT: 215 LBS | HEART RATE: 65 BPM | HEIGHT: 71 IN | OXYGEN SATURATION: 96 % | SYSTOLIC BLOOD PRESSURE: 132 MMHG | TEMPERATURE: 98.4 F | DIASTOLIC BLOOD PRESSURE: 74 MMHG | BODY MASS INDEX: 30.1 KG/M2

## 2018-08-29 DIAGNOSIS — I10 ESSENTIAL HYPERTENSION: ICD-10-CM

## 2018-08-29 DIAGNOSIS — I10 BENIGN ESSENTIAL HYPERTENSION: ICD-10-CM

## 2018-08-29 DIAGNOSIS — E78.01 FAMILIAL HYPERCHOLESTEROLEMIA: ICD-10-CM

## 2018-08-29 DIAGNOSIS — E11.9 TYPE 2 DIABETES MELLITUS WITHOUT COMPLICATION, WITHOUT LONG-TERM CURRENT USE OF INSULIN (HCC): ICD-10-CM

## 2018-08-29 DIAGNOSIS — F41.9 ANXIETY: Primary | ICD-10-CM

## 2018-08-29 PROCEDURE — 99214 OFFICE O/P EST MOD 30 MIN: CPT | Performed by: INTERNAL MEDICINE

## 2018-08-29 RX ORDER — LOSARTAN POTASSIUM 50 MG/1
50 TABLET ORAL DAILY
Qty: 90 TABLET | Refills: 3 | Status: SHIPPED | OUTPATIENT
Start: 2018-08-29 | End: 2019-11-26 | Stop reason: SDUPTHER

## 2018-08-29 RX ORDER — CITALOPRAM 10 MG/1
10 TABLET ORAL DAILY
Qty: 90 TABLET | Refills: 3 | Status: SHIPPED | OUTPATIENT
Start: 2018-08-29 | End: 2019-08-28 | Stop reason: SDUPTHER

## 2018-08-29 RX ORDER — GLIMEPIRIDE 2 MG/1
2 TABLET ORAL
Qty: 90 TABLET | Refills: 3 | Status: SHIPPED | OUTPATIENT
Start: 2018-08-29 | End: 2019-07-10

## 2018-08-29 NOTE — ASSESSMENT & PLAN NOTE
Patient has a long-term history of anxiety disorder and remains on Celexa 10 milligrams a day  He states the medication has been helpful especially dealing with his wife and her multiple medical problems  Patient was told if any problems with increasing anxiety or depression to please call    He has no suicidal thoughts or ideation

## 2018-08-29 NOTE — ASSESSMENT & PLAN NOTE
Because of an adverse reaction to the patient being on a statin in the past patient is on fenofibrate had 145 milligrams daily  Patient is given a slip to check on his cholesterol level with his next visit    We again we reinforced to with the patient the importance of watching his intake of fats and cholesterol

## 2018-08-29 NOTE — ASSESSMENT & PLAN NOTE
Lab Results   Component Value Date    HGBA1C 11 2 (H) 08/20/2018       No results for input(s): POCGLU in the last 72 hours  Blood Sugar Average: Last 72 hrs:   patient has longstanding history of diabetes mellitus type 2  We have been having trouble over the years with medication with the patient having both episodes of hyper and hypoglycemia  With his last visit he was placed on pioglitazone and we had given the patient a trial of the medication  He states after a few weeks he gave up taking the medication because he was breaking out in sweats after he took the medication  We are not sure whether this is secondary to hypoglycemia or an adverse reaction to the medication  Patient's sugar level is showing poor control with a hemoglobin A1c of 11 2  Patient admits that he is not always perfect with the diet  Alternatives to treatment at this point time would be to increase the Amaryl to 2 milligrams daily  Patient did have episodes in the past of hypoglycemia and he was told the importance of talking to us at the office immediately if any adverse effects from the increased dose of his Amaryl such as sweating, lightheadedness, dizziness so that we could make adjustments with his treatment as needed  Patient understands and agrees and we will check a fasting blood sugar, hemoglobin A1c with his next visit    Patient is also due for diabetic eye exam and we will make arrangements to have a copy of the consult to be performed by his ophthalmologist

## 2018-08-29 NOTE — ASSESSMENT & PLAN NOTE
Patient is had consistent control of his blood pressure with present medication  Patient has had no ill effects from the drug  With his next visit his blood pressure will be checked again and we will make adjustments to medication as needed  Patient also will have lab testing to check on his kidney function with his next visit

## 2018-08-29 NOTE — PROGRESS NOTES
Diabetic Foot Exam    Patient's shoes and socks removed  Right Foot/Ankle   Right Foot Inspection  Skin Exam: skin normal and skin intact no dry skin, no warmth, no callus, no erythema, no maceration, no abnormal color, no pre-ulcer, no ulcer and no callus                          Toe Exam: ROM and strength within normal limitsno swelling, no tenderness, erythema and  no right toe deformity  Sensory   Vibration: intact  Proprioception: intact   Monofilament testing: intact  Vascular  Capillary refills: < 3 seconds  The right DP pulse is 2+  The right PT pulse is 2+  Left Foot/Ankle  Left Foot Inspection  Skin Exam: skin normal and skin intactno dry skin, no warmth, no erythema, no maceration, normal color, no pre-ulcer, no ulcer and no callus                         Toe Exam: ROM and strength within normal limitsno swelling, no tenderness, no erythema and no left toe deformity                   Sensory   Vibration: intact  Proprioception: intact  Monofilament: intact  Vascular  Capillary refills: < 3 seconds  The left DP pulse is 2+  The left PT pulse is 2+  Assign Risk Category:  No deformity present; No loss of protective sensation;  No weak pulses       Risk: 0

## 2018-08-29 NOTE — PROGRESS NOTES
Assessment/Plan:    Diabetes ([facility] [address] )  Lab Results   Component Value Date    HGBA1C 11 2 (H) 08/20/2018       No results for input(s): POCGLU in the last 72 hours  Blood Sugar Average: Last 72 hrs:   patient has longstanding history of diabetes mellitus type 2  We have been having trouble over the years with medication with the patient having both episodes of hyper and hypoglycemia  With his last visit he was placed on pioglitazone and we had given the patient a trial of the medication  He states after a few weeks he gave up taking the medication because he was breaking out in sweats after he took the medication  We are not sure whether this is secondary to hypoglycemia or an adverse reaction to the medication  Patient's sugar level is showing poor control with a hemoglobin A1c of 11 2  Patient admits that he is not always perfect with the diet  Alternatives to treatment at this point time would be to increase the Amaryl to 2 milligrams daily  Patient did have episodes in the past of hypoglycemia and he was told the importance of talking to us at the office immediately if any adverse effects from the increased dose of his Amaryl such as sweating, lightheadedness, dizziness so that we could make adjustments with his treatment as needed  Patient understands and agrees and we will check a fasting blood sugar, hemoglobin A1c with his next visit  Patient is also due for diabetic eye exam and we will make arrangements to have a copy of the consult to be performed by his ophthalmologist    Benign essential hypertension  Patient is had consistent control of his blood pressure with present medication  Patient has had no ill effects from the drug  With his next visit his blood pressure will be checked again and we will make adjustments to medication as needed  Patient also will have lab testing to check on his kidney function with his next visit      Hyperlipidemia  Because of an adverse reaction to the patient being on a statin in the past patient is on fenofibrate had 145 milligrams daily  Patient is given a slip to check on his cholesterol level with his next visit  We again we reinforced to with the patient the importance of watching his intake of fats and cholesterol    Anxiety  Patient has a long-term history of anxiety disorder and remains on Celexa 10 milligrams a day  He states the medication has been helpful especially dealing with his wife and her multiple medical problems  Patient was told if any problems with increasing anxiety or depression to please call  He has no suicidal thoughts or ideation       Diagnoses and all orders for this visit:    Anxiety  -     citalopram (CeleXA) 10 mg tablet; Take 1 tablet (10 mg total) by mouth daily    Essential hypertension  -     losartan (COZAAR) 50 mg tablet; Take 1 tablet (50 mg total) by mouth daily  -     Basic metabolic panel; Future  -     Hemoglobin A1C; Future    Type 2 diabetes mellitus without complication, without long-term current use of insulin (HCC)  -     Basic metabolic panel; Future  -     Hemoglobin A1C; Future  -     glimepiride (AMARYL) 2 mg tablet; Take 1 tablet (2 mg total) by mouth daily with breakfast    Benign essential hypertension    Familial hypercholesterolemia          Subjective:      Patient ID: John Head  is a 80 y o  male  Patient is a pleasant 56-year-old male with a history of multiple medical problems including diabetes mellitus type 2, anxiety disorder, hypertension, hyperlipidemia  Patient is here today for a routine follow-up  He did have labs performed prior to being seen today we did discuss the results which show that he showing poor control of his blood sugars again    He admits that he stopped his medication because he was having some episodes, side effects from the pioglitazone        The following portions of the patient's history were reviewed and updated as appropriate:   He  has no past medical history on file  He   Patient Active Problem List    Diagnosis Date Noted   [facility] maintenance 05/22/2018    Diabetes ([address] ) 08/04/2014    Anxiety 09/20/2012    Benign essential hypertension 09/20/2012    Hyperlipidemia 09/20/2012     He  has no past surgical history on file  His family history is not on file  He  reports that he has never smoked  He has never used smokeless tobacco  His alcohol and drug histories are not on file  Current Outpatient Prescriptions   Medication Sig Dispense Refill    aspirin 81 mg chewable tablet Chew 1 tablet daily      bisoprolol-hydrochlorothiazide (ZIAC) 5-6 25 MG per tablet Take 1 tablet by mouth daily  3    citalopram (CeleXA) 10 mg tablet Take 1 tablet (10 mg total) by mouth daily 90 tablet 3    fenofibrate (TRICOR) 145 mg tablet Take 1 tablet (145 mg total) by mouth daily 90 tablet 3    glimepiride (AMARYL) 1 mg tablet TAKE ONE TABLET BY MOUTH EVERY DAY 30 tablet 5    Lancets MISC by Does not apply route 2 (two) times a day      losartan (COZAAR) 50 mg tablet Take 1 tablet (50 mg total) by mouth daily 90 tablet 3    metFORMIN (GLUCOPHAGE-XR) 500 mg 24 hr tablet Take 1 tablet (500 mg total) by mouth 2 (two) times a day with meals 180 tablet 3    glimepiride (AMARYL) 2 mg tablet Take 1 tablet (2 mg total) by mouth daily with breakfast 90 tablet 3     No current facility-administered medications for this visit        Current Outpatient Prescriptions on File Prior to Visit   Medication Sig    aspirin 81 mg chewable tablet Chew 1 tablet daily    bisoprolol-hydrochlorothiazide (ZIAC) 5-6 25 MG per tablet Take 1 tablet by mouth daily    fenofibrate (TRICOR) 145 mg tablet Take 1 tablet (145 mg total) by mouth daily    glimepiride (AMARYL) 1 mg tablet TAKE ONE TABLET BY MOUTH EVERY DAY    Lancets MISC by Does not apply route 2 (two) times a day    metFORMIN (GLUCOPHAGE-XR) 500 mg 24 hr tablet Take 1 tablet (500 mg total) by mouth 2 (two) times a day with meals    [DISCONTINUED] citalopram (CeleXA) 10 mg tablet Take 10 mg by mouth daily    [DISCONTINUED] losartan (COZAAR) 50 mg tablet Take 50 mg by mouth daily    [DISCONTINUED] pioglitazone (ACTOS) 15 mg tablet Take 1 tablet (15 mg total) by mouth daily for 120 days    [DISCONTINUED] bisoprolol-hydrochlorothiazide (ZIAC) 5-6 25 MG per tablet Take 1 tablet by mouth daily for 90 days    [DISCONTINUED] citalopram (CeleXA) 10 mg tablet Take 1 tablet (10 mg total) by mouth daily for 90 days    [DISCONTINUED] fenofibrate (TRICOR) 145 mg tablet Take 1 tablet by mouth daily    [DISCONTINUED] losartan (COZAAR) 50 mg tablet Take 1 tablet (50 mg total) by mouth daily for 90 days    [DISCONTINUED] metFORMIN (GLUCOPHAGE-XR) 500 mg 24 hr tablet 500 mg 2 (two) times a day     No current facility-administered medications on file prior to visit  He has No Known Allergies       Review of Systems   Constitutional: Negative  HENT: Negative  Eyes: Negative  Respiratory: Negative  Cardiovascular: Negative  Gastrointestinal: Negative  Endocrine: Negative  Genitourinary: Negative  Musculoskeletal: Negative  Skin: Negative  Allergic/Immunologic: Negative  Neurological: Negative  Hematological: Negative  Psychiatric/Behavioral: Negative  Objective:      /74   Pulse 65   Temp 98 4 °F (36 9 °C)   Ht 5' 11" (1 803 m)   Wt 97 5 kg (215 lb)   SpO2 96%   BMI 29 99 kg/m²          Physical Exam   Constitutional: He is oriented to person, place, and time  He appears well-developed and well-nourished  No distress  HENT:   Head: Normocephalic and atraumatic  Right Ear: External ear normal    Left Ear: External ear normal    Nose: Nose normal    Mouth/Throat: Oropharynx is clear and moist  No oropharyngeal exudate  Eyes: Conjunctivae and EOM are normal  Pupils are equal, round, and reactive to light  Right eye exhibits no discharge  Left eye exhibits no discharge  No scleral icterus  Neck: Normal range of motion  Neck supple  No JVD present  No tracheal deviation present  No thyromegaly present  Cardiovascular: Normal rate, regular rhythm, normal heart sounds and intact distal pulses  Exam reveals no gallop and no friction rub  No murmur heard  Pulmonary/Chest: Effort normal and breath sounds normal  No stridor  No respiratory distress  He has no wheezes  He has no rales  He exhibits no tenderness  Abdominal: Soft  Bowel sounds are normal  He exhibits no distension and no mass  There is no tenderness  There is no rebound and no guarding  Musculoskeletal: Normal range of motion  He exhibits no edema, tenderness or deformity  Lymphadenopathy:     He has no cervical adenopathy  Neurological: He is alert and oriented to person, place, and time  He has normal reflexes  He displays normal reflexes  No cranial nerve deficit  He exhibits abnormal muscle tone  Coordination normal    Skin: Skin is warm and dry  No rash noted  He is not diaphoretic  No erythema  No pallor  Psychiatric: He has a normal mood and affect  His behavior is normal  Judgment and thought content normal    Nursing note and vitals reviewed

## 2018-09-29 ENCOUNTER — IMMUNIZATION (OUTPATIENT)
Dept: INTERNAL MEDICINE CLINIC | Facility: CLINIC | Age: 68
End: 2018-09-29
Payer: MEDICARE

## 2018-09-29 DIAGNOSIS — Z23 ENCOUNTER FOR IMMUNIZATION: ICD-10-CM

## 2018-09-29 PROCEDURE — 90686 IIV4 VACC NO PRSV 0.5 ML IM: CPT

## 2018-09-29 PROCEDURE — G0008 ADMIN INFLUENZA VIRUS VAC: HCPCS

## 2018-10-08 ENCOUNTER — OFFICE VISIT (OUTPATIENT)
Dept: INTERNAL MEDICINE CLINIC | Facility: CLINIC | Age: 68
End: 2018-10-08
Payer: MEDICARE

## 2018-10-08 VITALS
WEIGHT: 219 LBS | TEMPERATURE: 98.2 F | SYSTOLIC BLOOD PRESSURE: 124 MMHG | HEIGHT: 71 IN | HEART RATE: 63 BPM | BODY MASS INDEX: 30.66 KG/M2 | OXYGEN SATURATION: 96 % | DIASTOLIC BLOOD PRESSURE: 74 MMHG

## 2018-10-08 DIAGNOSIS — I10 BENIGN ESSENTIAL HYPERTENSION: ICD-10-CM

## 2018-10-08 DIAGNOSIS — M25.561 ACUTE PAIN OF RIGHT KNEE: Primary | ICD-10-CM

## 2018-10-08 PROCEDURE — 99213 OFFICE O/P EST LOW 20 MIN: CPT | Performed by: INTERNAL MEDICINE

## 2018-10-08 NOTE — ASSESSMENT & PLAN NOTE
Patient did have an acute injury to his right knee while mowing the lawn outside he bumped his knee into a rock wall  Patient states he has been having pain to the whole knee over the weekend  He has no complaints of fusion but is a difficulty with walking and weight-bearing  On exam patient does have some slight deformity visually some difficulty with full extension to the knee, no crepitus, tenderness to the medial and laterally plateaus the tibia    Patient will be sent for evaluation by Orthopedics and treatment as indicated

## 2018-10-08 NOTE — PROGRESS NOTES
Assessment/Plan:      Diagnoses and all orders for this visit:    Acute pain of right knee  -     Ambulatory referral to Orthopedic Surgery; Future    Benign essential hypertension          Subjective:     Patient ID: Sarah Manuel  is a 80 y o  male  Patient is a 69-year-old male with a history of multiple medical problems who is being seen today after acute injury over the weekend to his right knee  Patient states that when he was mowing the lawn he bumped into a stone wall the area of the patella  He is complaining of pain throughout the knee but mostly in the region both laterally and medially to the tibial plateau  Review of Systems   Constitutional: Positive for activity change ( some restriction activity secondary to pain in the right knee)  Negative for appetite change, chills, diaphoresis, fatigue, fever and unexpected weight change  HENT: Negative  Eyes: Negative  Respiratory: Negative  Cardiovascular: Negative  Gastrointestinal: Negative  Endocrine: Negative  Genitourinary: Negative  Musculoskeletal: Positive for arthralgias  Neurological: Negative  Hematological: Negative  Psychiatric/Behavioral: Negative  Objective:     Physical Exam   Constitutional: He is oriented to person, place, and time  He appears well-developed and well-nourished  Pleasant, mildly overweight 69-year-old male who is awake alert   HENT:   Head: Normocephalic and atraumatic  Eyes: Pupils are equal, round, and reactive to light  EOM are normal    Neck: Normal range of motion  Neck supple  Cardiovascular: Normal rate and regular rhythm  Pulmonary/Chest: Effort normal and breath sounds normal    Abdominal: Soft  Bowel sounds are normal    Musculoskeletal: He exhibits edema and tenderness  Evaluation of the right knee shows some slight swelling especially to the area of the lateral and medial tibial plateau with tenderness to palpation    Patient has some restriction with motion especially with extension  She has no crepitus and no other gross deformities to the knee   Neurological: He is alert and oriented to person, place, and time  Skin: Skin is warm and dry  Psychiatric: He has a normal mood and affect  His behavior is normal  Thought content normal    Nursing note and vitals reviewed

## 2018-10-08 NOTE — ASSESSMENT & PLAN NOTE
As noted patient's blood pressure is under good control and patient will continue with present treatment and surveillance

## 2018-10-08 NOTE — PROGRESS NOTES
Assessment/Plan:      Diagnoses and all orders for this visit:    Acute pain of right knee  -     Ambulatory referral to Orthopedic Surgery; Future    Benign essential hypertension          Subjective:     Patient ID: Melanie Self  is a 80 y o  male  Patient is a 76-year-old male with a history of multiple medical problems  Patient is here today for evaluation of right knee pain after acute injury        Review of Systems   Constitutional: Positive for activity change (Patient has had some restriction in his activity level secondary to his right knee pain)  Negative for appetite change, chills, diaphoresis, fatigue, fever and unexpected weight change  HENT: Negative  Eyes: Negative  Respiratory: Negative  Cardiovascular: Negative  Gastrointestinal: Negative  Endocrine: Negative  Genitourinary: Negative  Musculoskeletal: Positive for arthralgias and gait problem  Negative for back pain, joint swelling and myalgias  Allergic/Immunologic: Negative  Hematological: Negative  Psychiatric/Behavioral: Negative  Objective:     Physical Exam   Constitutional: He is oriented to person, place, and time  He appears well-developed and well-nourished  Pleasant, mildly anxious 76-year-old male who is awake alert wearing a brace to his right knee   HENT:   Head: Normocephalic and atraumatic  Eyes: Pupils are equal, round, and reactive to light  Conjunctivae and EOM are normal    Neck: Normal range of motion  Cardiovascular: Normal rate and regular rhythm  Pulmonary/Chest: Effort normal and breath sounds normal    Abdominal: Soft  Bowel sounds are normal    Musculoskeletal: He exhibits tenderness and deformity  He exhibits no edema  Patient has on examination some mild visual deformity to his right knee especially to the area of the patella    He has no effusion noted on exam   He has tenderness to palpation to the area of the medial and lateral tibial plateaus with some decreased range of motion with maneuvers and difficulty with full extension of the knee  Neurological: He is alert and oriented to person, place, and time  Skin: Skin is warm and dry  Psychiatric: He has a normal mood and affect  His behavior is normal  Judgment and thought content normal    Nursing note and vitals reviewed

## 2019-01-03 ENCOUNTER — OFFICE VISIT (OUTPATIENT)
Dept: INTERNAL MEDICINE CLINIC | Facility: CLINIC | Age: 69
End: 2019-01-03
Payer: MEDICARE

## 2019-01-03 VITALS
WEIGHT: 218 LBS | OXYGEN SATURATION: 97 % | TEMPERATURE: 98.2 F | SYSTOLIC BLOOD PRESSURE: 136 MMHG | HEIGHT: 71 IN | HEART RATE: 64 BPM | DIASTOLIC BLOOD PRESSURE: 74 MMHG | BODY MASS INDEX: 30.52 KG/M2

## 2019-01-03 DIAGNOSIS — E66.9 OBESITY (BMI 30.0-34.9): ICD-10-CM

## 2019-01-03 DIAGNOSIS — E78.01 FAMILIAL HYPERCHOLESTEROLEMIA: ICD-10-CM

## 2019-01-03 DIAGNOSIS — E11.9 TYPE 2 DIABETES MELLITUS WITHOUT COMPLICATION, WITHOUT LONG-TERM CURRENT USE OF INSULIN (HCC): Primary | ICD-10-CM

## 2019-01-03 DIAGNOSIS — F41.9 ANXIETY: ICD-10-CM

## 2019-01-03 DIAGNOSIS — I10 BENIGN ESSENTIAL HYPERTENSION: ICD-10-CM

## 2019-01-03 DIAGNOSIS — Z00.00 HEALTHCARE MAINTENANCE: ICD-10-CM

## 2019-01-03 DIAGNOSIS — M25.561 ACUTE PAIN OF RIGHT KNEE: ICD-10-CM

## 2019-01-03 LAB
BUN SERPL-MCNC: 20 MG/DL (ref 7–25)
BUN/CREAT SERPL: ABNORMAL (CALC) (ref 6–22)
CALCIUM SERPL-MCNC: 9.4 MG/DL (ref 8.6–10.3)
CHLORIDE SERPL-SCNC: 100 MMOL/L (ref 98–110)
CO2 SERPL-SCNC: 27 MMOL/L (ref 20–32)
CREAT SERPL-MCNC: 1.08 MG/DL (ref 0.7–1.25)
GLUCOSE SERPL-MCNC: 181 MG/DL (ref 65–99)
HBA1C MFR BLD: 9.4 % OF TOTAL HGB
POTASSIUM SERPL-SCNC: 4.4 MMOL/L (ref 3.5–5.3)
SL AMB EGFR AFRICAN AMERICAN: 82 ML/MIN/1.73M2
SL AMB EGFR NON AFRICAN AMERICAN: 71 ML/MIN/1.73M2
SODIUM SERPL-SCNC: 137 MMOL/L (ref 135–146)

## 2019-01-03 PROCEDURE — 99214 OFFICE O/P EST MOD 30 MIN: CPT | Performed by: INTERNAL MEDICINE

## 2019-01-03 NOTE — ASSESSMENT & PLAN NOTE
Patient's blood pressure is showing relatively good control  We will continue to monitor this closely  Patient is on a low dose angiotensin receptor blocker    Patient has has a history of no compromise to his renal function

## 2019-01-03 NOTE — ASSESSMENT & PLAN NOTE
Patient relates that with his last visit he was having pain in the right knee  Apparently he has been helping his son move and was lifting a lot of heavy fracture  Patient states he took 2 Aleve and the pain is now completely gone  He was told if any new problems or complaints to please call    He was not seen by Orthopedics as ordered

## 2019-01-03 NOTE — ASSESSMENT & PLAN NOTE
Along with watching his intake of carbohydrates we did discuss the importance of limiting his intake of fats and carbohydrates in his diet  Patient understands and agrees  We will continue to modify treatment if necessary and patient will continue on the try cor as previously prescribed    Patient has a history of intolerance to statin drugs

## 2019-01-03 NOTE — PROGRESS NOTES
Assessment/Plan:    Diabetes ([facility] [address] )  Lab Results   Component Value Date    HGBA1C 9 4 (H) 01/02/2019       No results for input(s): POCGLU in the last 72 hours  Blood Sugar Average: Last 72 hrs:   with an increase in his dose of medication that he is taking orally for his diabetes patient is showing better control of his blood sugars with a hemoglobin A1c now now down to 9 4  Patient was told this is still not within an acceptable range and he was again informed the importance of watching his diet, decreasing his caloric intake and hopefully being more physically active  Patient understands and states that his sugar will be down with his next visit  He does not have an improvement in his glucose levels and hemoglobin A1c with his next visit consider further modification of treatment  Diabetic foot exam was performed today and is centrally normal   Patient does have an appointment next week to be seen by an ophthalmologist    Benign essential hypertension  Patient's blood pressure is showing relatively good control  We will continue to monitor this closely  Patient is on a low dose angiotensin receptor blocker  Patient has has a history of no compromise to his renal function    Anxiety  Patient remains on citalopram at 10 mg a day  States that he seems to be handling stress or appropriately especially dealing with his wife who is severely ill  Patient will continue with present medication    Hyperlipidemia  Along with watching his intake of carbohydrates we did discuss the importance of limiting his intake of fats and carbohydrates in his diet  Patient understands and agrees  We will continue to modify treatment if necessary and patient will continue on the try cor as previously prescribed  Patient has a history of intolerance to statin drugs    Acute pain of right knee  Patient relates that with his last visit he was having pain in the right knee    Apparently he has been helping his son move and was lifting a lot of heavy fracture  Patient states he took 2 Aleve and the pain is now completely gone  He was told if any new problems or complaints to please call  He was not seen by Orthopedics as ordered       Diagnoses and all orders for this visit:    Type 2 diabetes mellitus without complication, without long-term current use of insulin (HCC)  -     Basic metabolic panel; Future  -     Hemoglobin A1C; Future    Benign essential hypertension  -     Basic metabolic panel; Future    Anxiety    Familial hypercholesterolemia    Healthcare maintenance    Acute pain of right knee          Subjective:      Patient ID: Reema Govea  is a 80 y o  male  Patient is a very pleasant 60-year-old male with a history of multiple medical problems as outlined previously  Patient is here today for routine follow-up  He did have labs performed prior to the visit today we did discuss the results  Patient's sugar is showing better control but he is still not to goal   Patient admits that he has not been perfect with his diet especially during the holidays recently and will be working harder in the near future for better control of his diabetes  Medically he states he has no new complaints or concerns        The following portions of the patient's history were reviewed and updated as appropriate:   He  has no past medical history on file  He   Patient Active Problem List    Diagnosis Date Noted    Acute pain of right knee 10/08/2018    Healthcare maintenance 05/22/2018    Diabetes ([address] ) 08/04/2014    Anxiety 09/20/2012    Benign essential hypertension 09/20/2012    Hyperlipidemia 09/20/2012     He  has no past surgical history on file  His family history is not on file  He  reports that he has never smoked  He has never used smokeless tobacco  His alcohol and drug histories are not on file    Current Outpatient Prescriptions   Medication Sig Dispense Refill    aspirin 81 mg chewable tablet Chew 1 tablet daily  bisoprolol-hydrochlorothiazide (ZIAC) 5-6 25 MG per tablet Take 1 tablet by mouth daily  3    citalopram (CeleXA) 10 mg tablet Take 1 tablet (10 mg total) by mouth daily 90 tablet 3    fenofibrate (TRICOR) 145 mg tablet Take 1 tablet (145 mg total) by mouth daily 90 tablet 3    glimepiride (AMARYL) 1 mg tablet TAKE ONE TABLET BY MOUTH EVERY DAY 30 tablet 5    glimepiride (AMARYL) 2 mg tablet Take 1 tablet (2 mg total) by mouth daily with breakfast 90 tablet 3    Lancets MISC by Does not apply route 2 (two) times a day      losartan (COZAAR) 50 mg tablet Take 1 tablet (50 mg total) by mouth daily 90 tablet 3    metFORMIN (GLUCOPHAGE-XR) 500 mg 24 hr tablet Take 1 tablet (500 mg total) by mouth 2 (two) times a day with meals 180 tablet 3     No current facility-administered medications for this visit  Current Outpatient Prescriptions on File Prior to Visit   Medication Sig    aspirin 81 mg chewable tablet Chew 1 tablet daily    bisoprolol-hydrochlorothiazide (ZIAC) 5-6 25 MG per tablet Take 1 tablet by mouth daily    citalopram (CeleXA) 10 mg tablet Take 1 tablet (10 mg total) by mouth daily    fenofibrate (TRICOR) 145 mg tablet Take 1 tablet (145 mg total) by mouth daily    glimepiride (AMARYL) 1 mg tablet TAKE ONE TABLET BY MOUTH EVERY DAY    glimepiride (AMARYL) 2 mg tablet Take 1 tablet (2 mg total) by mouth daily with breakfast    Lancets MISC by Does not apply route 2 (two) times a day    losartan (COZAAR) 50 mg tablet Take 1 tablet (50 mg total) by mouth daily    metFORMIN (GLUCOPHAGE-XR) 500 mg 24 hr tablet Take 1 tablet (500 mg total) by mouth 2 (two) times a day with meals     No current facility-administered medications on file prior to visit  He has No Known Allergies       Review of Systems   Constitutional: Negative  HENT: Negative  Eyes: Negative  Respiratory: Negative  Cardiovascular: Negative  Gastrointestinal: Negative  Endocrine: Negative  Genitourinary: Negative  Musculoskeletal: Positive for arthralgias  Negative for back pain, gait problem (occasionally minor arthritic aches and pains but nothing new or disabling), joint swelling, myalgias, neck pain and neck stiffness  Skin: Negative  Allergic/Immunologic: Negative  Neurological: Negative  Hematological: Negative  Psychiatric/Behavioral: Negative  Objective:      /74   Pulse 64   Temp 98 2 °F (36 8 °C)   Ht 5' 11" (1 803 m)   Wt 98 9 kg (218 lb)   SpO2 97%   BMI 30 40 kg/m²          Physical Exam   Constitutional: He is oriented to person, place, and time  He appears well-developed and well-nourished  No distress  Patient is a very pleasant 61-year-old male who is awake alert no acute distress, mildly overweight   HENT:   Head: Normocephalic and atraumatic  Right Ear: External ear normal    Left Ear: External ear normal    Nose: Nose normal    Mouth/Throat: Oropharynx is clear and moist  No oropharyngeal exudate  Eyes: Pupils are equal, round, and reactive to light  Conjunctivae and EOM are normal  Right eye exhibits no discharge  Left eye exhibits no discharge  No scleral icterus  Neck: Normal range of motion  Neck supple  No JVD present  No tracheal deviation present  No thyromegaly present  Cardiovascular: Normal rate, regular rhythm, normal heart sounds and intact distal pulses  Exam reveals no gallop and no friction rub  No murmur heard  Pulmonary/Chest: Effort normal and breath sounds normal  No stridor  No respiratory distress  He has no wheezes  He has no rales  He exhibits no tenderness  Abdominal: Soft  Bowel sounds are normal  He exhibits no distension and no mass  There is no tenderness  There is no rebound and no guarding  Obese   Musculoskeletal: Normal range of motion  He exhibits no edema, tenderness or deformity  Lymphadenopathy:     He has no cervical adenopathy     Neurological: He is alert and oriented to person, place, and time  He has normal reflexes  He displays normal reflexes  No cranial nerve deficit  He exhibits normal muscle tone  Coordination normal    Skin: Skin is warm and dry  No rash noted  He is not diaphoretic  No erythema  No pallor  Psychiatric: He has a normal mood and affect  His behavior is normal  Judgment and thought content normal    Nursing note and vitals reviewed  BMI Counseling: Body mass index is 30 4 kg/m²  Discussed the patient's BMI with him  The BMI is above average  BMI counseling and education was provided to the patient  Nutrition recommendations include reducing portion sizes, decreasing overall calorie intake, consuming healthier snacks, moderation in carbohydrate intake and reducing intake of cholesterol  Exercise recommendations include moderate aerobic physical activity for 150 minutes/week

## 2019-01-03 NOTE — ASSESSMENT & PLAN NOTE
Lab Results   Component Value Date    HGBA1C 9 4 (H) 01/02/2019       No results for input(s): POCGLU in the last 72 hours  Blood Sugar Average: Last 72 hrs:   with an increase in his dose of medication that he is taking orally for his diabetes patient is showing better control of his blood sugars with a hemoglobin A1c now now down to 9 4  Patient was told this is still not within an acceptable range and he was again informed the importance of watching his diet, decreasing his caloric intake and hopefully being more physically active  Patient understands and states that his sugar will be down with his next visit  He does not have an improvement in his glucose levels and hemoglobin A1c with his next visit consider further modification of treatment    Diabetic foot exam was performed today and is centrally normal   Patient does have an appointment next week to be seen by an ophthalmologist

## 2019-01-03 NOTE — PATIENT INSTRUCTIONS
Low Fat Diet   AMBULATORY CARE:   A low-fat diet  is an eating plan that is low in total fat, unhealthy fat, and cholesterol  You may need to follow a low-fat diet if you have trouble digesting or absorbing fat  You may also need to follow this diet if you have high cholesterol  You can also lower your cholesterol by increasing the amount of fiber in your diet  Soluble fiber is a type of fiber that helps to decrease cholesterol levels  Different types of fat in food:   · Limit unhealthy fats  A diet that is high in cholesterol, saturated fat, and trans fat may cause unhealthy cholesterol levels  Unhealthy cholesterol levels increase your risk of heart disease  ¨ Cholesterol:  Limit intake of cholesterol to less than 200 mg per day  Cholesterol is found in meat, eggs, and dairy  ¨ Saturated fat:  Limit saturated fat to less than 7% of your total daily calories  Ask your dietitian how many calories you need each day  Saturated fat is found in butter, cheese, ice cream, whole milk, and palm oil  Saturated fat is also found in meat, such as beef, pork, chicken skin, and processed meats  Processed meats include sausage, hot dogs, and bologna  ¨ Trans fat:  Avoid trans fat as much as possible  Trans fat is used in fried and baked foods  Foods that say trans fat free on the label may still have up to 0 5 grams of trans fat per serving  · Include healthy fats  Replace foods that are high in saturated and trans fat with foods high in healthy fats  This may help to decrease high cholesterol levels  ¨ Monounsaturated fats: These are found in avocados, nuts, and vegetable oils, such as olive, canola, and sunflower oil  ¨ Polyunsaturated fats: These can be found in vegetable oils, such as soybean or corn oil  Omega-3 fats can help to decrease the risk of heart disease  Omega-3 fats are found in fish, such as salmon, herring, trout, and tuna   Omega-3 fats can also be found in plant foods, such as walnuts, flaxseed, soybeans, and canola oil    Foods to limit or avoid:   · Grains:      ¨ Snacks that are made with partially hydrogenated oils, such as chips, regular crackers, and butter-flavored popcorn    ¨ High-fat baked goods, such as biscuits, croissants, doughnuts, pies, cookies, and pastries    · Dairy:      ¨ Whole milk, 2% milk, and yogurt and ice cream made with whole milk    ¨ Half and half creamer, heavy cream, and whipping cream    ¨ Cheese, cream cheese, and sour cream    · Meats and proteins:      ¨ High-fat cuts of meat (T-bone steak, regular hamburger, and ribs)    ¨ Fried meat, poultry (turkey and chicken), and fish    ¨ Poultry (chicken and turkey) with skin    ¨ Cold cuts (salami or bologna), hot dogs, bryant, and sausage    ¨ Whole eggs and egg yolks    · Vegetables and fruits with added fat:      ¨ Fried vegetables or vegetables in butter or high-fat sauces, such as cream or cheese sauces    ¨ Fried fruit or fruit served with butter or cream    · Fats:      ¨ Butter, stick margarine, and shortening    ¨ Coconut, palm oil, and palm kernel oil  Foods to include:   · Grains:      ¨ Whole-grain breads, cereals, pasta, and brown rice    ¨ Low-fat crackers and pretzels    · Vegetables and fruits:      ¨ Fresh, frozen, or canned vegetables (no salt or low-sodium)    ¨ Fresh, frozen, dried, or canned fruit (canned in light syrup or fruit juice)    ¨ Avocado    · Low-fat dairy products:      ¨ Nonfat (skim) or 1% milk    ¨ Nonfat or low-fat cheese, yogurt, and cottage cheese    · Meats and proteins:      ¨ Chicken or turkey with no skin    ¨ Baked or broiled fish    ¨ Lean beef and pork (loin, round, extra lean hamburger)    ¨ Beans and peas, unsalted nuts, soy products    ¨ Egg whites and substitutes    ¨ Seeds and nuts    · Fats:      ¨ Unsaturated oil, such as canola, olive, peanut, soybean, or sunflower oil    ¨ Soft or liquid margarine and vegetable oil spread    ¨ Low-fat salad dressing  Other ways to decrease fat:   · Read food labels before you buy foods  Choose foods that have less than 30% of calories from fat  Choose low-fat or fat-free dairy products  Remember that fat free does not mean calorie free  These foods still contain calories, and too many calories can lead to weight gain  · Trim fat from meat and avoid fried food  Trim all visible fat from meat before you cook it  Remove the skin from poultry  Do not cervantes meat, fish, or poultry  Bake, roast, boil, or broil these foods instead  Avoid fried foods  Eat a baked potato instead of [place] fries  Steam vegetables instead of sautéing them in butter  · Add less fat to foods  Use imitation bryant bits on salads and baked potatoes instead of regular bryant bits  Use fat-free or low-fat salad dressings instead of regular dressings  Use low-fat or nonfat butter-flavored topping instead of regular butter or margarine on popcorn and other foods  Ways to decrease fat in recipes:  Replace high-fat ingredients with low-fat or nonfat ones  This may cause baked goods to be drier than usual  You may need to use nonfat cooking spray on pans to prevent food from sticking  You also may need to change the amount of other ingredients, such as water, in the recipe  Try the following:  · Use low-fat or light margarine instead of regular margarine or shortening  · Use lean ground turkey breast or chicken, or lean ground beef (less than 5% fat) instead of hamburger  · Add 1 teaspoon of canola oil to 8 ounces of skim milk instead of using cream or half and half  · Use grated zucchini, carrots, or apples in breads instead of coconut  · Use blenderized, low-fat cottage cheese, plain tofu, or low-fat ricotta cheese instead of cream cheese  · Use 1 egg white and 1 teaspoon of canola oil, or use ¼ cup (2 ounces) of fat-free egg substitute instead of a whole egg       · Replace half of the oil that is called for in a recipe with applesauce when you bake  Use 3 tablespoons of cocoa powder and 1 tablespoon of canola oil instead of a square of baking chocolate  How to increase fiber:  Eat enough high-fiber foods to get 20 to 30 grams of fiber every day  Slowly increase your fiber intake to avoid stomach cramps, gas, and other problems  · Eat 3 ounces of whole-grain foods each day  An ounce is about 1 slice of bread  Eat whole-grain breads, such as whole-wheat bread  Whole wheat, whole-wheat flour, or other whole grains should be listed as the first ingredient on the food label  Replace white flour with whole-grain flour or use half of each in recipes  Whole-grain flour is heavier than white flour, so you may have to add more yeast or baking powder  · Eat a high-fiber cereal for breakfast   Oatmeal is a good source of soluble fiber  Look for cereals that have bran or fiber in the name  Choose whole-grain products, such as brown rice, barley, and whole-wheat pasta  · Eat more beans, peas, and lentils  For example, add beans to soups or salads  Eat at least 5 cups of fruits and vegetables each day  Eat fruits and vegetables with the peel because the peel is high in fiber  © 2017 [employer] Information is for End User's use only and may not be sold, redistributed or otherwise used for commercial purposes  All illustrations and images included in CareNotes® are the copyrighted property of [employer]  or [employer]  The above information is an  only  It is not intended as medical advice for individual conditions or treatments  Talk to your doctor, nurse or pharmacist before following any medical regimen to see if it is safe and effective for you  Heart Healthy Diet   AMBULATORY CARE:   A heart healthy diet  is an eating plan low in total fat, unhealthy fats, and sodium (salt)  A heart healthy diet helps decrease your risk for heart disease and stroke   Limit the amount of fat you eat to 25% to 35% of your total daily calories  Limit sodium to less than 2,300 mg each day  Healthy fats:  Healthy fats can help improve cholesterol levels  The risk for heart disease is decreased when cholesterol levels are normal  Choose healthy fats, such as the following:  · Unsaturated fat  is found in foods such as soybean, canola, olive, corn, and safflower oils  It is also found in soft tub margarine that is made with liquid vegetable oil  · Omega-3 fat  is found in certain fish, such as salmon, tuna, and trout, and in walnuts and flaxseed  Unhealthy fats:  Unhealthy fats can cause unhealthy cholesterol levels in your blood and increase your risk of heart disease  Limit unhealthy fats, such as the following:  · Cholesterol  is found in animal foods, such as eggs and lobster, and in dairy products made from whole milk  Limit cholesterol to less than 300 milligrams (mg) each day  You may need to limit cholesterol to 200 mg each day if you have heart disease  · Saturated fat  is found in meats, such as bryant and hamburger  It is also found in chicken or turkey skin, whole milk, and butter  Limit saturated fat to less than 7% of your total daily calories  Limit saturated fat to less than 6% if you have heart disease or are at increased risk for it  · Trans fat  is found in packaged foods, such as potato chips and cookies  It is also in hard margarine, some fried foods, and shortening  Avoid trans fats as much as possible    Heart healthy foods and drinks to include:  Ask your dietitian or healthcare provider how many servings to have from each of the following food groups:  · Grains:      ¨ Whole-wheat breads, cereals, and pastas, and brown rice    ¨ Low-fat, low-sodium crackers and chips    · Vegetables:      ¨ Broccoli, green beans, green peas, and spinach    ¨ Collards, kale, and lima beans    ¨ Carrots, sweet potatoes, tomatoes, and peppers    ¨ Canned vegetables with no salt added    · Fruits:      ¨ Bananas, peaches, pears, and pineapple    ¨ Grapes, raisins, and dates    ¨ Oranges, tangerines, grapefruit, orange juice, and grapefruit juice    ¨ Apricots, mangoes, melons, and papaya    ¨ Raspberries and strawberries    ¨ Canned fruit with no added sugar    · Low-fat dairy products:      ¨ Nonfat (skim) milk, 1% milk, and low-fat almond, cashew, or soy milks fortified with calcium    ¨ Low-fat cheese, regular or frozen yogurt, and cottage cheese    · Meats and proteins , such as lean cuts of beef and pork (loin, leg, round), skinless chicken and turkey, legumes, soy products, egg whites, and nuts  Foods and drinks to limit or avoid:  Ask your dietitian or healthcare provider about these and other foods that are high in unhealthy fat, sodium, and sugar:  · Snack or packaged foods , such as frozen dinners, cookies, macaroni and cheese, and cereals with more than 300 mg of sodium per serving    · Canned or dry mixes  for cakes, soups, sauces, or gravies    · Vegetables with added sodium , such as instant potatoes, vegetables with added sauces, or regular canned vegetables    · Other foods high in sodium , such as ketchup, barbecue sauce, salad dressing, pickles, olives, soy sauce, and miso    · High-fat dairy foods  such as whole or 2% milk, cream cheese, or sour cream, and cheeses     · High-fat protein foods  such as high-fat cuts of beef (T-bone steaks, ribs), chicken or turkey with skin, and organ meats, such as liver    · Cured or smoked meats , such as hot dogs, bryant, and sausage    · Unhealthy fats and oils , such as butter, stick margarine, shortening, and cooking oils such as coconut or palm oil    · Food and drinks high in sugar , such as soft drinks (soda), sports drinks, sweetened tea, candy, cake, cookies, pies, and doughnuts  Other diet guidelines to follow:   · Eat more foods containing omega-3 fats  Eat fish high in omega-3 fats at least 2 times a week  · Limit alcohol    Too much alcohol can damage your heart and raise your blood pressure  Women should limit alcohol to 1 drink a day  Men should limit alcohol to 2 drinks a day  A drink of alcohol is 12 ounces of beer, 5 ounces of wine, or 1½ ounces of liquor  · Choose low-sodium foods  High-sodium foods can lead to high blood pressure  Add little or no salt to food you prepare  Use herbs and spices in place of salt  · Eat more fiber  to help lower cholesterol levels  Eat at least 5 servings of fruits and vegetables each day  Eat 3 ounces of whole-grain foods each day  Legumes (beans) are also a good source of fiber  Lifestyle guidelines:   · Do not smoke  Nicotine and other chemicals in cigarettes and cigars can cause lung and heart damage  Ask your healthcare provider for information if you currently smoke and need help to quit  E-cigarettes or smokeless tobacco still contain nicotine  Talk to your healthcare provider before you use these products  · Exercise regularly  to help you maintain a healthy weight and improve your blood pressure and cholesterol levels  Ask your healthcare provider about the best exercise plan for you  Do not start an exercise program without asking your healthcare provider  Follow up with your healthcare provider as directed:  Write down your questions so you remember to ask them during your visits  © 2017 [address] [facility] Information is for End User's use only and may not be sold, redistributed or otherwise used for commercial purposes  All illustrations and images included in CareNotes® are the copyrighted property of [employer]  or [employer] Jiménez  The above information is an  only  It is not intended as medical advice for individual conditions or treatments  Talk to your doctor, nurse or pharmacist before following any medical regimen to see if it is safe and effective for you  Calorie Counting Diet   WHAT YOU NEED TO KNOW:   What is a calorie counting diet?   It is a meal plan based on counting calories each day to reach a healthy body weight  You will need to eat fewer calories if you are trying to lose weight  Weight loss may decrease your risk for certain health problems or improve your health if you have health problems  Some of these health problems include heart disease, high blood pressure, and diabetes  What foods should I avoid? Your dietitian will tell you if you need to avoid certain foods based on your body weight and health condition  You may need to avoid high-fat foods if you are at risk for or have heart disease  You may need to eat fewer foods from the breads and starches food group if you have diabetes  How many calories are in foods? The following is a list of foods and drinks with the approximate number of calories in each  Check the food label to find the exact number of calories  A dietitian can tell you how many calories you should have from each food group each day    · Carbohydrate:      ¨ ½ of a 3-inch bagel, 1 slice of bread, or ½ of a hamburger bun or hot dog bun (80)    ¨ 1 (8-inch) flour tortilla or ½ cup of cooked rice (100)    ¨ 1 (6-inch) corn tortilla (80)    ¨ 1 (6-inch) pancake or 1 cup of bran flakes cereal (110)    ¨ ½ cup of cooked cereal (80)    ¨ ½ cup of cooked pasta (85)    ¨ 1 ounce of pretzels (100)    ¨ 3 cups of air-popped popcorn without butter or oil (80)    · Dairy:      ¨ 1 cup of skim or 1% milk (90)    ¨ 1 cup of 2% milk (120)    ¨ 1 cup of whole milk (160)    ¨ 1 cup of 2% chocolate milk (220)    ¨ 1 ounce of low-fat cheese with 3 grams of fat per ounce (70)    ¨ 1 ounce of cheddar cheese (114)    ¨ ½ cup of 1% fat cottage cheese (80)    ¨ 1 cup of plain or sugar-free, fat-free yogurt (90)    · Protein foods:      ¨ 3 ounces of fish (not breaded or fried) (95)    ¨ 3 ounces of breaded, fried fish (195)    ¨ ¾ cup of tuna canned in water (105)    ¨ 3 ounces of chicken breast without skin (105)    ¨ 1 fried chicken breast with skin (350)    ¨ ¼ cup of fat free egg substitute (40)    ¨ 1 large egg (75)    ¨ 3 ounces of lean beef or pork (165)    ¨ 3 ounces of fried pork chop or ham (185)    ¨ ½ cup of cooked dried beans, such as kidney, barney, lentils, or navy (115)    ¨ 3 ounces of bologna or lunch meat (225)    ¨ 2 links of breakfast sausage (140)    · Vegetables:      ¨ ½ cup of sliced mushrooms (10)    ¨ 1 cup of salad greens, such as lettuce, spinach, or gila (15)    ¨ ½ cup of steamed asparagus (20)    ¨ ½ cup of cooked summer squash, zucchini squash, or green or wax beans (25)    ¨ 1 cup of broccoli or cauliflower florets, or 1 medium tomato (25)    ¨ 1 large raw carrot or ½ cup of cooked carrots (40)    ¨ ? of a medium cucumber or 1 stalk of celery (5)    ¨ 1 small baked potato (160)    ¨ 1 cup of breaded, fried vegetables (230)    · Fruit:      ¨ 1 (6-inch) banana (55)     ¨ ½ of a 4-inch grapefruit (55)    ¨ 15 grapes (60)    ¨ 1 medium orange or apple (70)    ¨ 1 large peach (65)    ¨ 1 cup of fresh pineapple chunks (75)    ¨ 1 cup of melon cubes (50)    ¨ 1¼ cups of whole strawberries (45)    ¨ ½ cup of fruit canned in juice (55)    ¨ ½ cup of fruit canned in heavy syrup (110)    ¨ ?  cup of raisins (130)    ¨ ½ cup of unsweetened fruit juice (60)    ¨ ½ cup of grape, cranberry, or prune juice (90)    · Fat:      ¨ 10 peanuts or 2 teaspoons of peanut butter (55)    ¨ 2 tablespoons of avocado or 1 tablespoon of regular salad dressing (45)    ¨ 2 slices of bryant (90)    ¨ 1 teaspoon of oil, such as safflower, canola, corn, or olive oil (45)    ¨ 2 teaspoons of low-fat margarine, or 1 tablespoon of low-fat mayonnaise (50)    ¨ 1 teaspoon of regular margarine (40)    ¨ 1 tablespoon of regular mayonnaise (135)    ¨ 1 tablespoon of cream cheese or 2 tablespoons of low-fat cream cheese (45)    ¨ 2 tablespoons of vegetable shortening (215)    · Dessert and sweets:      ¨ 8 animal crackers or 5 vanilla wafers (80)    ¨ 1 frozen fruit juice bar (80)    ¨ ½ cup of ice milk or low-fat frozen yogurt (90)    ¨ ½ cup of sherbet or sorbet (125)    ¨ ½ cup of sugar-free pudding or custard (60)    ¨ ½ cup of ice cream (140)    ¨ ½ cup of pudding or custard (175)    ¨ 1 (2-inch) square chocolate brownie (185)    · Combination foods:      ¨ Bean burrito made with an 8-inch tortilla, without cheese (275)    ¨ Chicken breast sandwich with lettuce and tomato (325)    ¨ 1 cup of chicken noodle soup (60)    ¨ 1 beef taco (175)    ¨ Regular hamburger with lettuce and tomato (310)    ¨ Regular cheeseburger with lettuce and tomato (410)     ¨ ¼ of a 12-inch cheese pizza (280)    ¨ Fried fish sandwich with lettuce and tomato (425)    ¨ Hot dog and bun (275)    ¨ 1½ cups of macaroni and cheese (310)    ¨ Taco salad with a fried tortilla shell (870)    · Low-calorie foods:      ¨ 1 tablespoon of ketchup or 1 tablespoon of fat free sour cream (15)    ¨ 1 teaspoon of mustard (5)    ¨ ¼ cup of salsa (20)    ¨ 1 large dill pickle (15)    ¨ 1 tablespoon of fat free salad dressing (10)    ¨ 2 teaspoons of low-sugar, light jam or jelly, or 1 tablespoon of sugar-free syrup (15)    ¨ 1 sugar-free popsicle (15)    ¨ 1 cup of club soda, seltzer water, or diet soda (0)  CARE AGREEMENT:   You have the right to help plan your care  Discuss treatment options with your caregivers to decide what care you want to receive  You always have the right to refuse treatment  The above information is an  only  It is not intended as medical advice for individual conditions or treatments  Talk to your doctor, nurse or pharmacist before following any medical regimen to see if it is safe and effective for you  © 2017 [address] [employer] Information is for End User's use only and may not be sold, redistributed or otherwise used for commercial purposes   All illustrations and images included in CareNotes® are the copyrighted property of [employer]  or [employer]

## 2019-01-03 NOTE — ASSESSMENT & PLAN NOTE
Patient remains on citalopram at 10 mg a day  States that he seems to be handling stress or appropriately especially dealing with his wife who is severely ill    Patient will continue with present medication

## 2019-05-17 DIAGNOSIS — Z71.89 COMPLEX CARE COORDINATION: Primary | ICD-10-CM

## 2019-06-18 DIAGNOSIS — E11.9 TYPE 2 DIABETES MELLITUS WITHOUT COMPLICATION, WITHOUT LONG-TERM CURRENT USE OF INSULIN (HCC): ICD-10-CM

## 2019-06-18 DIAGNOSIS — I10 ESSENTIAL HYPERTENSION: ICD-10-CM

## 2019-06-18 RX ORDER — FENOFIBRATE 145 MG/1
TABLET, COATED ORAL
Qty: 90 TABLET | Refills: 3 | Status: SHIPPED | OUTPATIENT
Start: 2019-06-18 | End: 2020-06-05 | Stop reason: SDUPTHER

## 2019-06-18 RX ORDER — BISOPROLOL FUMARATE AND HYDROCHLOROTHIAZIDE 5; 6.25 MG/1; MG/1
TABLET ORAL
Qty: 90 TABLET | Refills: 3 | Status: SHIPPED | OUTPATIENT
Start: 2019-06-18 | End: 2020-06-05 | Stop reason: SDUPTHER

## 2019-07-02 LAB
LEFT EYE DIABETIC RETINOPATHY: NORMAL
RIGHT EYE DIABETIC RETINOPATHY: NORMAL
SEVERITY (EYE EXAM): NORMAL

## 2019-07-09 LAB
BUN SERPL-MCNC: 19 MG/DL (ref 7–25)
BUN/CREAT SERPL: ABNORMAL (CALC) (ref 6–22)
CALCIUM SERPL-MCNC: 9 MG/DL (ref 8.6–10.3)
CHLORIDE SERPL-SCNC: 102 MMOL/L (ref 98–110)
CO2 SERPL-SCNC: 24 MMOL/L (ref 20–32)
CREAT SERPL-MCNC: 0.97 MG/DL (ref 0.7–1.25)
GLUCOSE SERPL-MCNC: 224 MG/DL (ref 65–99)
HBA1C MFR BLD: 10.8 % OF TOTAL HGB
POTASSIUM SERPL-SCNC: 4.3 MMOL/L (ref 3.5–5.3)
SL AMB EGFR AFRICAN AMERICAN: 93 ML/MIN/1.73M2
SL AMB EGFR NON AFRICAN AMERICAN: 80 ML/MIN/1.73M2
SODIUM SERPL-SCNC: 133 MMOL/L (ref 135–146)

## 2019-07-10 ENCOUNTER — OFFICE VISIT (OUTPATIENT)
Dept: INTERNAL MEDICINE CLINIC | Facility: CLINIC | Age: 69
End: 2019-07-10
Payer: MEDICARE

## 2019-07-10 VITALS
WEIGHT: 215 LBS | BODY MASS INDEX: 30.1 KG/M2 | DIASTOLIC BLOOD PRESSURE: 80 MMHG | HEIGHT: 71 IN | OXYGEN SATURATION: 97 % | TEMPERATURE: 98.6 F | SYSTOLIC BLOOD PRESSURE: 130 MMHG | HEART RATE: 62 BPM

## 2019-07-10 DIAGNOSIS — Z12.5 PROSTATE CANCER SCREENING: ICD-10-CM

## 2019-07-10 DIAGNOSIS — E11.9 TYPE 2 DIABETES MELLITUS WITHOUT COMPLICATION, WITHOUT LONG-TERM CURRENT USE OF INSULIN (HCC): Primary | ICD-10-CM

## 2019-07-10 DIAGNOSIS — I10 BENIGN ESSENTIAL HYPERTENSION: ICD-10-CM

## 2019-07-10 DIAGNOSIS — F41.9 ANXIETY: ICD-10-CM

## 2019-07-10 DIAGNOSIS — Z00.00 HEALTHCARE MAINTENANCE: ICD-10-CM

## 2019-07-10 DIAGNOSIS — E78.01 FAMILIAL HYPERCHOLESTEROLEMIA: ICD-10-CM

## 2019-07-10 DIAGNOSIS — Z12.11 COLON CANCER SCREENING: ICD-10-CM

## 2019-07-10 PROCEDURE — 99214 OFFICE O/P EST MOD 30 MIN: CPT | Performed by: INTERNAL MEDICINE

## 2019-07-10 RX ORDER — GLIMEPIRIDE 4 MG/1
4 TABLET ORAL
Qty: 90 TABLET | Refills: 3 | Status: SHIPPED | OUTPATIENT
Start: 2019-07-10 | End: 2019-08-19 | Stop reason: SDUPTHER

## 2019-07-10 RX ORDER — METFORMIN HYDROCHLORIDE 500 MG/1
500 TABLET, EXTENDED RELEASE ORAL 2 TIMES DAILY WITH MEALS
Qty: 180 TABLET | Refills: 3 | Status: SHIPPED | OUTPATIENT
Start: 2019-07-10 | End: 2019-08-19 | Stop reason: DRUGHIGH

## 2019-07-10 RX ORDER — BISOPROLOL FUMARATE AND HYDROCHLOROTHIAZIDE 5; 6.25 MG/1; MG/1
1 TABLET ORAL DAILY
Qty: 90 TABLET | Refills: 3 | Status: SHIPPED | OUTPATIENT
Start: 2019-07-10 | End: 2019-08-19 | Stop reason: SDUPTHER

## 2019-07-10 RX ORDER — FENOFIBRATE 145 MG/1
145 TABLET, COATED ORAL DAILY
Qty: 90 TABLET | Refills: 3 | Status: SHIPPED | OUTPATIENT
Start: 2019-07-10 | End: 2019-08-19 | Stop reason: SDUPTHER

## 2019-07-10 RX ORDER — CITALOPRAM 10 MG/1
10 TABLET ORAL DAILY
Qty: 90 TABLET | Refills: 3 | Status: SHIPPED | OUTPATIENT
Start: 2019-07-10 | End: 2019-08-19 | Stop reason: SDUPTHER

## 2019-07-10 RX ORDER — LOSARTAN POTASSIUM 50 MG/1
50 TABLET ORAL DAILY
Qty: 90 TABLET | Refills: 3 | Status: SHIPPED | OUTPATIENT
Start: 2019-07-10 | End: 2019-08-19 | Stop reason: SDUPTHER

## 2019-07-10 NOTE — ASSESSMENT & PLAN NOTE
Patient's blood pressure showing adequate control present treatment  Patient will continue present medication and we will be continuing to watch his kidney function and this was ordered with his next visit    Patient is compliant with his medication

## 2019-07-10 NOTE — ASSESSMENT & PLAN NOTE
Lab Results   Component Value Date    HGBA1C 10 8 (H) 07/08/2019       No results for input(s): POCGLU in the last 72 hours  Blood Sugar Average: Last 72 hrs:   as noted patient's hemoglobin A1c is showing extremely poor control of his diabetes  Patient admits that he has not been watching his diet, watching his dietary intake of concentrated sweets, simple carbohydrates, calories  He is not getting any regular exercise either  The decision today was to increase his Amaryl to 4 mg a day and we will make arrangements of the patient be seen by Endocrinology for further evaluation and treatment is indicated    Weight did discuss at length today with the patient all term side effects full poorly controlled diabetes including disorders of the high, coronary artery disease, stroke, peripheral vascular disease with an the importance of keeping his sugars under control before he develops any long-term consequences of hyperglycemia

## 2019-07-10 NOTE — ASSESSMENT & PLAN NOTE
Patient does have a longstanding history of anxiety disorder  He states that emotionally he is stable while he is on the Celexa  He has tried to wean himself off medication previously and with adverse results    Patient will continue present medication

## 2019-07-10 NOTE — PROGRESS NOTES
Assessment/Plan:    Diabetes ([facility] )  Lab Results   Component Value Date    HGBA1C 10 8 (H) 07/08/2019       No results for input(s): POCGLU in the last 72 hours  Blood Sugar Average: Last 72 hrs:   as noted patient's hemoglobin A1c is showing extremely poor control of his diabetes  Patient admits that he has not been watching his diet, watching his dietary intake of concentrated sweets, simple carbohydrates, calories  He is not getting any regular exercise either  The decision today was to increase his Amaryl to 4 mg a day and we will make arrangements of the patient be seen by Endocrinology for further evaluation and treatment is indicated  Weight did discuss at length today with the patient all term side effects full poorly controlled diabetes including disorders of the high, coronary artery disease, stroke, peripheral vascular disease with an the importance of keeping his sugars under control before he develops any long-term consequences of hyperglycemia    Benign essential hypertension  Patient's blood pressure showing adequate control present treatment  Patient will continue present medication and we will be continuing to watch his kidney function and this was ordered with his next visit  Patient is compliant with his medication    Hyperlipidemia  Patient has a history of hyperlipidemia, intolerant to statin drugs and we have tried him on multiple medications  He remains on fenofibrate 145 mg daily  Again he was told the importance of also control of his blood sugars in order to keep his cholesterol down  Patient understands and agrees  Anxiety  Patient does have a longstanding history of anxiety disorder  He states that emotionally he is stable while he is on the Celexa  He has tried to wean himself off medication previously and with adverse results    Patient will continue present medication       Diagnoses and all orders for this visit:    Type 2 diabetes mellitus without complication, without long-term current use of insulin (HCC)  -     glimepiride (AMARYL) 4 mg tablet; Take 1 tablet (4 mg total) by mouth daily with breakfast  -     metFORMIN (GLUCOPHAGE-XR) 500 mg 24 hr tablet; Take 1 tablet (500 mg total) by mouth 2 (two) times a day with meals  -     Comprehensive metabolic panel; Future  -     CBC and differential; Future  -     Lipid panel; Future  -     TSH, 3rd generation with Free T4 reflex; Future  -     Urinalysis with reflex to microscopic; Future  -     Microalbumin / creatinine urine ratio  -     Hemoglobin A1C; Future  -     Ambulatory referral to Endocrinology; Future    Healthcare maintenance  -     Comprehensive metabolic panel; Future  -     CBC and differential; Future  -     Lipid panel; Future  -     TSH, 3rd generation with Free T4 reflex; Future  -     Urinalysis with reflex to microscopic; Future    Benign essential hypertension  -     losartan (COZAAR) 50 mg tablet; Take 1 tablet (50 mg total) by mouth daily  -     bisoprolol-hydrochlorothiazide (ZIAC) 5-6 25 MG per tablet; Take 1 tablet by mouth daily  -     fenofibrate (TRICOR) 145 mg tablet; Take 1 tablet (145 mg total) by mouth daily  -     Comprehensive metabolic panel; Future  -     CBC and differential; Future  -     Lipid panel; Future  -     TSH, 3rd generation with Free T4 reflex; Future  -     Urinalysis with reflex to microscopic; Future    Familial hypercholesterolemia  -     Comprehensive metabolic panel; Future  -     CBC and differential; Future  -     Lipid panel; Future  -     TSH, 3rd generation with Free T4 reflex; Future    Anxiety  -     citalopram (CeleXA) 10 mg tablet; Take 1 tablet (10 mg total) by mouth daily    Prostate cancer screening  -     PSA, Total Screen; Future    Colon cancer screening  -     Occult Blood, Fecal Immunochemical; Future          Subjective:      Patient ID: Cherelle Garay  is a 77 y o  male      Patient is a 27-year-old male with a history of multiple medical problems as outlined previously  Patient is here today for routine follow-up after four-month period of time  Patient states in general he is feeling well and he has no new medical complaints or concerns  He did have lab testing prior to the visit today we did discuss the results showing extremely poor control of his diabetes      The following portions of the patient's history were reviewed and updated as appropriate: He  has no past medical history on file  He   Patient Active Problem List    Diagnosis Date Noted    Acute pain of right knee 10/08/2018    Healthcare maintenance 05/22/2018    Diabetes ([address] ) 08/04/2014    Anxiety 09/20/2012    Benign essential hypertension 09/20/2012    Hyperlipidemia 09/20/2012     He  has no past surgical history on file  His family history is not on file  He  reports that he has never smoked  He has never used smokeless tobacco  His alcohol and drug histories are not on file    Current Outpatient Medications   Medication Sig Dispense Refill    aspirin 81 mg chewable tablet Chew 1 tablet daily      bisoprolol-hydrochlorothiazide (ZIAC) 5-6 25 MG per tablet TAKE 1 TABLET EVERY DAY 90 tablet 3    citalopram (CeleXA) 10 mg tablet Take 1 tablet (10 mg total) by mouth daily 90 tablet 3    fenofibrate (TRICOR) 145 mg tablet TAKE 1 TABLET EVERY DAY 90 tablet 3    Lancets MISC by Does not apply route 2 (two) times a day      losartan (COZAAR) 50 mg tablet Take 1 tablet (50 mg total) by mouth daily 90 tablet 3    metFORMIN (GLUCOPHAGE-XR) 500 mg 24 hr tablet Take 1 tablet (500 mg total) by mouth 2 (two) times a day with meals 180 tablet 3    bisoprolol-hydrochlorothiazide (ZIAC) 5-6 25 MG per tablet Take 1 tablet by mouth daily 90 tablet 3    citalopram (CeleXA) 10 mg tablet Take 1 tablet (10 mg total) by mouth daily 90 tablet 3    fenofibrate (TRICOR) 145 mg tablet Take 1 tablet (145 mg total) by mouth daily 90 tablet 3    glimepiride (AMARYL) 4 mg tablet Take 1 tablet (4 mg total) by mouth daily with breakfast 90 tablet 3    losartan (COZAAR) 50 mg tablet Take 1 tablet (50 mg total) by mouth daily 90 tablet 3    metFORMIN (GLUCOPHAGE-XR) 500 mg 24 hr tablet Take 1 tablet (500 mg total) by mouth 2 (two) times a day with meals 180 tablet 3     No current facility-administered medications for this visit  Current Outpatient Medications on File Prior to Visit   Medication Sig    aspirin 81 mg chewable tablet Chew 1 tablet daily    bisoprolol-hydrochlorothiazide (ZIAC) 5-6 25 MG per tablet TAKE 1 TABLET EVERY DAY    citalopram (CeleXA) 10 mg tablet Take 1 tablet (10 mg total) by mouth daily    fenofibrate (TRICOR) 145 mg tablet TAKE 1 TABLET EVERY DAY    Lancets MISC by Does not apply route 2 (two) times a day    losartan (COZAAR) 50 mg tablet Take 1 tablet (50 mg total) by mouth daily    metFORMIN (GLUCOPHAGE-XR) 500 mg 24 hr tablet Take 1 tablet (500 mg total) by mouth 2 (two) times a day with meals    [DISCONTINUED] glimepiride (AMARYL) 1 mg tablet TAKE ONE TABLET BY MOUTH EVERY DAY    [DISCONTINUED] glimepiride (AMARYL) 2 mg tablet Take 1 tablet (2 mg total) by mouth daily with breakfast     No current facility-administered medications on file prior to visit  He has No Known Allergies       Review of Systems   Constitutional: Negative  HENT: Negative  Eyes: Negative  Respiratory: Negative  Cardiovascular: Negative  Gastrointestinal: Negative  Endocrine: Positive for polyuria  Negative for cold intolerance, heat intolerance, polydipsia and polyphagia  Genitourinary: Negative  Musculoskeletal: Negative  Skin: Negative  Allergic/Immunologic: Negative  Neurological: Negative  Hematological: Negative  Psychiatric/Behavioral: Negative            Objective:      /80   Pulse 62   Temp 98 6 °F (37 °C)   Ht 5' 11" (1 803 m)   Wt 97 5 kg (215 lb)   SpO2 97%   BMI 29 99 kg/m²          Physical Exam   Constitutional: He is oriented to person, place, and time  He appears well-developed and well-nourished  No distress  Pleasant, cheerful, overweight 71-year-old male who is awake alert in no acute distress and oriented x3   HENT:   Head: Normocephalic and atraumatic  Right Ear: External ear normal    Left Ear: External ear normal    Nose: Nose normal    Mouth/Throat: No oropharyngeal exudate  Pink but slightly dry mucous membranes   Eyes: Pupils are equal, round, and reactive to light  Conjunctivae and EOM are normal  Right eye exhibits no discharge  Left eye exhibits no discharge  No scleral icterus  Neck: Normal range of motion  Neck supple  No JVD present  No tracheal deviation present  No thyromegaly present  Cardiovascular: Normal rate, regular rhythm, normal heart sounds and intact distal pulses  Exam reveals no gallop and no friction rub  No murmur heard  Pulmonary/Chest: Effort normal and breath sounds normal  No stridor  No respiratory distress  He has no wheezes  He has no rales  He exhibits no tenderness  Abdominal: Soft  Bowel sounds are normal  He exhibits no distension and no mass  There is no tenderness  There is no rebound and no guarding  No hernia  Musculoskeletal: Normal range of motion  He exhibits no edema, tenderness or deformity  Lymphadenopathy:     He has no cervical adenopathy  Neurological: He is alert and oriented to person, place, and time  He displays normal reflexes  No cranial nerve deficit or sensory deficit  He exhibits normal muscle tone  Coordination normal    Skin: Skin is warm and dry  Capillary refill takes less than 2 seconds  No rash noted  He is not diaphoretic  No erythema  No pallor  Psychiatric: He has a normal mood and affect  His behavior is normal  Judgment and thought content normal    Nursing note and vitals reviewed

## 2019-07-10 NOTE — ASSESSMENT & PLAN NOTE
Patient has a history of hyperlipidemia, intolerant to statin drugs and we have tried him on multiple medications  He remains on fenofibrate 145 mg daily  Again he was told the importance of also control of his blood sugars in order to keep his cholesterol down  Patient understands and agrees

## 2019-07-25 ENCOUNTER — APPOINTMENT (OUTPATIENT)
Dept: LAB | Facility: CLINIC | Age: 69
End: 2019-07-25
Payer: MEDICARE

## 2019-07-25 DIAGNOSIS — Z12.11 COLON CANCER SCREENING: ICD-10-CM

## 2019-07-25 LAB — HEMOCCULT STL QL IA: NEGATIVE

## 2019-07-25 PROCEDURE — G0328 FECAL BLOOD SCRN IMMUNOASSAY: HCPCS

## 2019-08-19 ENCOUNTER — DOCUMENTATION (OUTPATIENT)
Dept: ENDOCRINOLOGY | Facility: CLINIC | Age: 69
End: 2019-08-19

## 2019-08-19 ENCOUNTER — CONSULT (OUTPATIENT)
Dept: ENDOCRINOLOGY | Facility: CLINIC | Age: 69
End: 2019-08-19
Payer: MEDICARE

## 2019-08-19 VITALS
DIASTOLIC BLOOD PRESSURE: 84 MMHG | BODY MASS INDEX: 29.96 KG/M2 | SYSTOLIC BLOOD PRESSURE: 152 MMHG | HEIGHT: 71 IN | WEIGHT: 214 LBS

## 2019-08-19 DIAGNOSIS — E78.2 MIXED HYPERLIPIDEMIA: ICD-10-CM

## 2019-08-19 DIAGNOSIS — I10 ESSENTIAL HYPERTENSION: Primary | ICD-10-CM

## 2019-08-19 DIAGNOSIS — E11.9 TYPE 2 DIABETES MELLITUS WITHOUT COMPLICATION, WITHOUT LONG-TERM CURRENT USE OF INSULIN (HCC): ICD-10-CM

## 2019-08-19 DIAGNOSIS — E11.649 UNCONTROLLED TYPE 2 DIABETES MELLITUS WITH HYPOGLYCEMIA, UNSPECIFIED HYPOGLYCEMIA COMA STATUS (HCC): Primary | ICD-10-CM

## 2019-08-19 LAB — SL AMB POCT GLUCOSE BLD: 179

## 2019-08-19 PROCEDURE — 82948 REAGENT STRIP/BLOOD GLUCOSE: CPT | Performed by: INTERNAL MEDICINE

## 2019-08-19 PROCEDURE — 99205 OFFICE O/P NEW HI 60 MIN: CPT | Performed by: INTERNAL MEDICINE

## 2019-08-19 RX ORDER — GLIMEPIRIDE 4 MG/1
TABLET ORAL
Qty: 45 TABLET | Refills: 3 | Status: SHIPPED | OUTPATIENT
Start: 2019-08-19 | End: 2020-02-10

## 2019-08-19 RX ORDER — METFORMIN HYDROCHLORIDE 500 MG/1
1000 TABLET, EXTENDED RELEASE ORAL 2 TIMES DAILY WITH MEALS
Qty: 360 TABLET | Refills: 1 | Status: SHIPPED | OUTPATIENT
Start: 2019-08-19 | End: 2020-04-14

## 2019-08-19 NOTE — PATIENT INSTRUCTIONS
Hypoglycemia instructions   Marrilisa Head   8/19/2019  [id number]    Low Blood Sugar    Steps to treat low blood sugar  1  Test blood sugar if you have symptoms of low blood sugar:   Low Blood Sugar Symptoms:  o Sweaty  o Dizzy  o Rapid heartbeat  o Shaky    o Bad mood  o Hungry      2  Treat blood sugar less than 70 with 15 grams of fast-acting carbohydrate:   Examples of 15 grams Fast-Acting Carbohydrate:  o 4 oz juice  o 4 oz regular soda  o 3-4 glucose tablets (chew)  o 3-4 hard candies (chew)              3    Wait 15 minutes and test your blood sugar again           4   If blood sugar is less than 100, repeat steps 2-3       5  When your blood sugar is 100 or more, eat a snack if it will be longer than one hour until your next meal  The snack should be 15 grams of carbohydrate and a protein:   Examples of snacks:  o ½ sandwich  o 6 crackers with cheese  o Piece of fruit with cheese or peanut butter  o 6 crackers with peanut butter

## 2019-08-19 NOTE — PROGRESS NOTES
New Patient Progress Note      No chief complaint on file  Referring Provider  Ish Ortega, Do  [address]  [address] [place] [address], [address]     History of Present Illness:   Chanel Alaniz  is a 77 y o  male with a history of type 2  diabetes without long term use of insulin since 40-50 yrs   Diabetes course has been un-stable  Reports No complications of diabetes   Denies recent illness or hospitalizations  Denies recent severe hypoglycemic or severe hyperglycemic episodes  Denies any issues with his current regimen  home glucose monitoring: are not  performed   He does not have glucometer     Lab Results   Component Value Date    CREATININE 0 97 07/08/2019   Denies symptoms of hypoglycemia      Medic alert tag: recommended: Yes   Has not Seen CDE before   Diet- he eats 3 meals, breakfast and dinner ( main meals)                further diabetic ROS: no polyuria or polydipsia, no chest pain, dyspnea or TIAs, no numbness, tingling or pain in extremities, no unusual visual symptoms, no hypoglycemia, no medication side effects noted      acute symptoms are excessive urination and excessive thirst     Opthamology: sees regularly ; no retinopathy   Podiatry: does not see     Has hypertension: followed by PCP; on bisoprolol hydrochlorothiazide combination,  Has hyperlipidemia: followed by PCP; on fenofibrate - tolerating well, no myalgias  compliant most of the time  denies any side effects from medications  Denies history of pancreatitis    Results for Emilia Wade (MRN [id number]) as of 8/19/2019 08:16   Ref   Range 4/30/2018 08:03   Cholesterol Latest Ref Range: <200 mg/dL 229 (H)   Triglycerides Latest Ref Range: <150 mg/dL 384 (H)   HDL Latest Ref Range: >40 mg/dL 31 (L)   Non-HDL Cholesterol Latest Ref Range: <130 mg/dL (calc) 198 (H)   LDL Direct Latest Units: mg/dL (calc) 139 (H)   Chol HDLC Ratio Latest Ref Range: <5 0 (calc) 7 4 (H)     Lab Results   Component Value Date    HGBA1C 10  8 (H) 07/08/2019         Component      Latest Ref Rng & Units 1/2/2019 7/8/2019   Glucose, Random      65 - 99 mg/dL 181 (H) 224 (H)   BUN      7 - 25 mg/dL 20 19   Creatinine      0 70 - 1 25 mg/dL 1 08 0 97   eGFR Non       > OR = 60 mL/min/1 73m2 71 80   eGFR       > OR = 60 mL/min/1 73m2 82 93   SL AMB BUN/CREATININE RATIO      6 - 22 (calc) NOT APPLICABLE NOT APPLICABLE   Sodium      135 - 146 mmol/L 137 133 (L)   Potassium      3 5 - 5 3 mmol/L 4 4 4 3   Chloride      98 - 110 mmol/L 100 102   CO2      20 - 32 mmol/L 27 24   SL AMB CALCIUM      8 6 - 10 3 mg/dL 9 4 9 0   Hemoglobin A1C      <5 7 % of total Hgb 9 4 (H) 10 8 (H)   OCCULT BLD, FECAL IMMUNOLOGICAL      Negative       Patient Active Problem List   Diagnosis    Anxiety    Benign essential hypertension    Diabetes ([facility] )    Hyperlipidemia    Healthcare maintenance    Acute pain of right knee      History reviewed  No pertinent past medical history  History reviewed  No pertinent surgical history  History reviewed  No pertinent family history    Social History     Tobacco Use    Smoking status: Never Smoker    Smokeless tobacco: Never Used   Substance Use Topics    Alcohol use: Not on file     No Known Allergies      Current Outpatient Medications:     aspirin 81 mg chewable tablet, Chew 1 tablet daily, Disp: , Rfl:     bisoprolol-hydrochlorothiazide (ZIAC) 5-6 25 MG per tablet, TAKE 1 TABLET EVERY DAY, Disp: 90 tablet, Rfl: 3    citalopram (CeleXA) 10 mg tablet, Take 1 tablet (10 mg total) by mouth daily, Disp: 90 tablet, Rfl: 3    fenofibrate (TRICOR) 145 mg tablet, TAKE 1 TABLET EVERY DAY, Disp: 90 tablet, Rfl: 3    glimepiride (AMARYL) 4 mg tablet, Take 1 tablet with breakfast and 1/2 tablet with dinner, Disp: 45 tablet, Rfl: 3    Lancets MISC, by Does not apply route 2 (two) times a day, Disp: , Rfl:     losartan (COZAAR) 50 mg tablet, Take 1 tablet (50 mg total) by mouth daily, Disp: 90 tablet, Rfl: 3    metFORMIN (GLUCOPHAGE-XR) 500 mg 24 hr tablet, Take 2 tablets (1,000 mg total) by mouth 2 (two) times a day with meals, Disp: 360 tablet, Rfl: 1    sitaGLIPtin (JANUVIA) 100 mg tablet, Take 1 tablet (100 mg total) by mouth daily, Disp: 30 tablet, Rfl: 4  Review of Systems   Constitutional: Positive for unexpected weight change (Losing weight by intention)  Negative for activity change, diaphoresis, fatigue and fever  HENT: Negative  Eyes: Negative for visual disturbance  Respiratory: Negative for cough, chest tightness and shortness of breath  Cardiovascular: Negative for chest pain, palpitations and leg swelling  Gastrointestinal: Negative for abdominal pain, blood in stool, constipation, diarrhea, nausea and vomiting  Endocrine: Negative for cold intolerance, heat intolerance, polydipsia, polyphagia and polyuria  Genitourinary: Negative for dysuria, enuresis, frequency and urgency  Musculoskeletal: Negative for arthralgias and myalgias  Skin: Negative for pallor, rash and wound  Allergic/Immunologic: Negative  Neurological: Negative for dizziness, tremors, weakness and numbness  Hematological: Negative  Psychiatric/Behavioral: Negative  Physical Exam:  Body mass index is 29 85 kg/m²  /84   Ht 5' 11" (1 803 m)   Wt 97 1 kg (214 lb)   BMI 29 85 kg/m²    Wt Readings from Last 3 Encounters:   08/19/19 97 1 kg (214 lb)   07/10/19 97 5 kg (215 lb)   01/03/19 98 9 kg (218 lb)       Physical Exam   Constitutional: He is oriented to person, place, and time  He appears well-developed and well-nourished  No distress  HENT:   Head: Normocephalic and atraumatic  Eyes: Pupils are equal, round, and reactive to light  Conjunctivae and EOM are normal  Right eye exhibits no discharge  Left eye exhibits no discharge  Neck: Normal range of motion  Neck supple  No tracheal deviation present  No thyromegaly present     Cardiovascular: Normal rate, regular rhythm, normal heart sounds and intact distal pulses  Exam reveals no friction rub  No murmur heard  Pulmonary/Chest: Effort normal and breath sounds normal  No respiratory distress  He has no wheezes  He has no rales  Abdominal: Soft  Bowel sounds are normal  He exhibits no distension  Musculoskeletal: Normal range of motion  He exhibits no edema, tenderness or deformity  Lymphadenopathy:     He has no cervical adenopathy  Neurological: He is alert and oriented to person, place, and time  He has normal reflexes  He displays normal reflexes  Skin: Skin is warm and dry  No rash noted  He is not diaphoretic  No erythema  No pallor  Psychiatric: He has a normal mood and affect  His behavior is normal    Vitals reviewed  Diabetic Foot Exam    Labs:   No components found for: HA1C  No components found for: GLU    Lab Results   Component Value Date    CREATININE 0 97 07/08/2019    CREATININE 1 08 01/02/2019    CREATININE 0 98 04/30/2018    BUN 19 07/08/2019     10/13/2016    K 4 3 07/08/2019     07/08/2019    CO2 24 07/08/2019     No results found for: EGFR  No components found for: [facility]    Lab Results   Component Value Date    CHOL 189 04/24/2017    HDL 31 (L) 04/30/2018    TRIG 384 (H) 04/30/2018       Lab Results   Component Value Date    ALT 19 04/30/2018    AST 13 04/30/2018    ALKPHOS 65 04/30/2018    BILITOT 0 6 10/13/2016       No results found for: TSH, FREET4, TSI    Impression:  1  Essential hypertension    2  Type 2 diabetes mellitus without complication, without long-term current use of insulin ([address] )    3  Mixed hyperlipidemia           Plan:    Diagnoses and all orders for this visit:    Essential hypertension  Discussed the BP goals with pt    130/80 mm of hg   Discussed to follow up with PCP     Type 2 diabetes mellitus without complication, without long-term current use of insulin ([facility])    Lab Results   Component Value Date    HGBA1C 10 8 (H) 07/08/2019   A1c is uncontrolled suggestive of hyperglycemia   Discussed about starting Basal insulin, he declined to take insulin  Will increase metformin to 1000 mg twice a day   Will start Januvia 100 mg po daily   Will continue amaryl 4 mg in AM and start amaryl 2 mg at dinner   Dicussed the risk of hypoglycemia by adding medications, and T/t   Discussed the long term complications of DM including CVD, Retinopathy, Neuropathy and nephropathy   Will refer to nutrition therapy and CDE         -     Ambulatory referral to Endocrinology  -     metFORMIN (GLUCOPHAGE-XR) 500 mg 24 hr tablet; Take 2 tablets (1,000 mg total) by mouth 2 (two) times a day with meals  -     sitaGLIPtin (JANUVIA) 100 mg tablet; Take 1 tablet (100 mg total) by mouth daily  -     Ambulatory referral to Diabetic Education  -     Ambulatory referral to medical nutrition therapy for diabetes; Future  -     Basic metabolic panel; Future  -     Hemoglobin A1C; Future  -     Microalbumin / creatinine urine ratio; Future  -     glimepiride (AMARYL) 4 mg tablet; Take 1 tablet with breakfast and 1/2 tablet with dinner    Mixed hyperlipidemia  Increase Fish oil to 1000 mg twice a day   Continue Fenofibrate 145 mg po daily   Order lipid profile before next appt     -     Lipid panel Lab Collect Lab Collect; Future          Discussed with the patient and all questioned fully answered  He will call me if any problems arise      Counseled patient on diagnostic results, prognosis, risk and benefit of treatment options, instruction for management, importance of treatment compliance, Risk  factor reduction and impressions      Jill Ford MD

## 2019-08-21 ENCOUNTER — DOCUMENTATION (OUTPATIENT)
Dept: ENDOCRINOLOGY | Facility: CLINIC | Age: 69
End: 2019-08-21

## 2019-08-22 DIAGNOSIS — E11.9 TYPE 2 DIABETES MELLITUS WITHOUT COMPLICATION, WITHOUT LONG-TERM CURRENT USE OF INSULIN (HCC): ICD-10-CM

## 2019-08-22 RX ORDER — GLIMEPIRIDE 2 MG/1
2 TABLET ORAL
Qty: 90 TABLET | Refills: 3 | Status: SHIPPED | OUTPATIENT
Start: 2019-08-22 | End: 2019-11-25

## 2019-08-28 DIAGNOSIS — F41.9 ANXIETY: ICD-10-CM

## 2019-08-28 RX ORDER — CITALOPRAM 10 MG/1
TABLET ORAL
Qty: 90 TABLET | Refills: 3 | Status: SHIPPED | OUTPATIENT
Start: 2019-08-28 | End: 2020-06-05 | Stop reason: SDUPTHER

## 2019-09-26 ENCOUNTER — DOCUMENTATION (OUTPATIENT)
Dept: ENDOCRINOLOGY | Facility: CLINIC | Age: 69
End: 2019-09-26

## 2019-10-01 LAB
ALBUMIN SERPL-MCNC: 4.5 G/DL (ref 3.6–5.1)
ALBUMIN/GLOB SERPL: 2.4 (CALC) (ref 1–2.5)
ALP SERPL-CCNC: 32 U/L (ref 40–115)
ALT SERPL-CCNC: 23 U/L (ref 9–46)
APPEARANCE UR: CLEAR
AST SERPL-CCNC: 14 U/L (ref 10–35)
BASOPHILS # BLD AUTO: 38 CELLS/UL (ref 0–200)
BASOPHILS NFR BLD AUTO: 0.6 %
BILIRUB SERPL-MCNC: 0.5 MG/DL (ref 0.2–1.2)
BILIRUB UR QL STRIP: NEGATIVE
BUN SERPL-MCNC: 19 MG/DL (ref 7–25)
BUN/CREAT SERPL: ABNORMAL (CALC) (ref 6–22)
CALCIUM SERPL-MCNC: 9.6 MG/DL (ref 8.6–10.3)
CHLORIDE SERPL-SCNC: 103 MMOL/L (ref 98–110)
CHOLEST SERPL-MCNC: 144 MG/DL
CHOLEST/HDLC SERPL: 3.8 (CALC)
CO2 SERPL-SCNC: 28 MMOL/L (ref 20–32)
COLOR UR: YELLOW
CREAT SERPL-MCNC: 1.02 MG/DL (ref 0.7–1.25)
EOSINOPHIL # BLD AUTO: 132 CELLS/UL (ref 15–500)
EOSINOPHIL NFR BLD AUTO: 2.1 %
ERYTHROCYTE [DISTWIDTH] IN BLOOD BY AUTOMATED COUNT: 14 % (ref 11–15)
GLOBULIN SER CALC-MCNC: 1.9 G/DL (CALC) (ref 1.9–3.7)
GLUCOSE SERPL-MCNC: 104 MG/DL (ref 65–99)
GLUCOSE UR QL STRIP: NEGATIVE
HBA1C MFR BLD: 7.9 % OF TOTAL HGB
HCT VFR BLD AUTO: 46.2 % (ref 38.5–50)
HDLC SERPL-MCNC: 38 MG/DL
HGB BLD-MCNC: 15.4 G/DL (ref 13.2–17.1)
HGB UR QL STRIP: NEGATIVE
KETONES UR QL STRIP: NEGATIVE
LDLC SERPL CALC-MCNC: 82 MG/DL (CALC)
LEUKOCYTE ESTERASE UR QL STRIP: NEGATIVE
LYMPHOCYTES # BLD AUTO: 1462 CELLS/UL (ref 850–3900)
LYMPHOCYTES NFR BLD AUTO: 23.2 %
MCH RBC QN AUTO: 28.4 PG (ref 27–33)
MCHC RBC AUTO-ENTMCNC: 33.3 G/DL (ref 32–36)
MCV RBC AUTO: 85.2 FL (ref 80–100)
MONOCYTES # BLD AUTO: 769 CELLS/UL (ref 200–950)
MONOCYTES NFR BLD AUTO: 12.2 %
NEUTROPHILS # BLD AUTO: 3900 CELLS/UL (ref 1500–7800)
NEUTROPHILS NFR BLD AUTO: 61.9 %
NITRITE UR QL STRIP: NEGATIVE
NONHDLC SERPL-MCNC: 106 MG/DL (CALC)
PH UR STRIP: 5.5 [PH] (ref 5–8)
PLATELET # BLD AUTO: 182 THOUSAND/UL (ref 140–400)
PMV BLD REES-ECKER: 10.7 FL (ref 7.5–12.5)
POTASSIUM SERPL-SCNC: 4.2 MMOL/L (ref 3.5–5.3)
PROT SERPL-MCNC: 6.4 G/DL (ref 6.1–8.1)
PROT UR QL STRIP: NEGATIVE
RBC # BLD AUTO: 5.42 MILLION/UL (ref 4.2–5.8)
SL AMB EGFR AFRICAN AMERICAN: 87 ML/MIN/1.73M2
SL AMB EGFR NON AFRICAN AMERICAN: 75 ML/MIN/1.73M2
SODIUM SERPL-SCNC: 137 MMOL/L (ref 135–146)
SP GR UR STRIP: 1.02 (ref 1–1.03)
TRIGL SERPL-MCNC: 137 MG/DL
TSH SERPL-ACNC: 0.85 MIU/L (ref 0.4–4.5)
WBC # BLD AUTO: 6.3 THOUSAND/UL (ref 3.8–10.8)

## 2019-10-07 ENCOUNTER — OFFICE VISIT (OUTPATIENT)
Dept: INTERNAL MEDICINE CLINIC | Facility: CLINIC | Age: 69
End: 2019-10-07
Payer: MEDICARE

## 2019-10-07 VITALS
HEIGHT: 71 IN | TEMPERATURE: 98.2 F | HEART RATE: 63 BPM | SYSTOLIC BLOOD PRESSURE: 116 MMHG | BODY MASS INDEX: 30.57 KG/M2 | OXYGEN SATURATION: 97 % | RESPIRATION RATE: 14 BRPM | WEIGHT: 218.4 LBS | DIASTOLIC BLOOD PRESSURE: 70 MMHG

## 2019-10-07 DIAGNOSIS — E11.9 TYPE 2 DIABETES MELLITUS WITHOUT COMPLICATION, WITHOUT LONG-TERM CURRENT USE OF INSULIN (HCC): Primary | ICD-10-CM

## 2019-10-07 DIAGNOSIS — I10 BENIGN ESSENTIAL HYPERTENSION: ICD-10-CM

## 2019-10-07 DIAGNOSIS — E78.2 MIXED HYPERLIPIDEMIA: ICD-10-CM

## 2019-10-07 DIAGNOSIS — Z23 ENCOUNTER FOR IMMUNIZATION: ICD-10-CM

## 2019-10-07 DIAGNOSIS — F41.9 ANXIETY: ICD-10-CM

## 2019-10-07 PROCEDURE — G0008 ADMIN INFLUENZA VIRUS VAC: HCPCS

## 2019-10-07 PROCEDURE — 90662 IIV NO PRSV INCREASED AG IM: CPT

## 2019-10-07 PROCEDURE — 99214 OFFICE O/P EST MOD 30 MIN: CPT | Performed by: INTERNAL MEDICINE

## 2019-10-07 NOTE — ASSESSMENT & PLAN NOTE
Lab Results   Component Value Date    HGBA1C 7 9 (H) 09/30/2019    As noted patient's hemoglobin A1c is dramatically improved from his last visit  Patient has been to see an endocrinologist and they stated to the patient that he is a candidate to be placed on the insulin and I do agree as noted previously  Patient states that he has been working harder on his diet will along with seeing a nutritionist   He has not lost any appreciable weight since his last visit and has not started routine exercise program which he knows is important    We will continue to follow up on his blood sugar readings, further treatment and evaluation as per endocrinologist   Patient was commended on the changes and improvement

## 2019-10-07 NOTE — ASSESSMENT & PLAN NOTE
Patient admits that he is not always perfect with his diet but has made a concerted effort in order to watch his intake of fats and cholesterol with his diet along with working to control his blood sugar readings  We will continue to monitor his cholesterol profile  Patient will remain on try cor 145 mg per day and is intolerant to statin drugs

## 2019-10-07 NOTE — PROGRESS NOTES
Diabetic Foot Exam    Patient's shoes and socks removed  Right Foot/Ankle   Right Foot Inspection  Skin Exam: skin normal and skin intact no dry skin, no warmth, no callus, no erythema, no maceration, no abnormal color, no pre-ulcer, no ulcer and no callus                          Toe Exam: ROM and strength within normal limitsno swelling, no tenderness, erythema and  no right toe deformity  Sensory   Vibration: intact  Proprioception: intact   Monofilament testing: intact  Vascular  Capillary refills: < 3 seconds  The right DP pulse is 2+  The right PT pulse is 2+  Left Foot/Ankle  Left Foot Inspection  Skin Exam: skin normal and skin intactno dry skin, no warmth, no erythema, no maceration, normal color, no pre-ulcer, no ulcer and no callus                         Toe Exam: ROM and strength within normal limitsno swelling, no tenderness, no erythema and no left toe deformity                   Sensory   Vibration: intact  Proprioception: intact    Vascular  Capillary refills: < 3 seconds  The left DP pulse is 2+  The left PT pulse is 2+  Assign Risk Category:  No deformity present; No loss of protective sensation;  No weak pulses       Risk: 0

## 2019-10-07 NOTE — PROGRESS NOTES
Assessment/Plan:    Diabetes ([facility] )    Lab Results   Component Value Date    HGBA1C 7 9 (H) 09/30/2019    As noted patient's hemoglobin A1c is dramatically improved from his last visit  Patient has been to see an endocrinologist and they stated to the patient that he is a candidate to be placed on the insulin and I do agree as noted previously  Patient states that he has been working harder on his diet will along with seeing a nutritionist   He has not lost any appreciable weight since his last visit and has not started routine exercise program which he knows is important  We will continue to follow up on his blood sugar readings, further treatment and evaluation as per endocrinologist   Patient was commended on the changes and improvement    Benign essential hypertension  Patient's blood pressure showing excellent control  Patient will continue medication as previously and surveillance  We will continue to modify treatment as needed and will also continue to watch his renal function    Hyperlipidemia  Patient admits that he is not always perfect with his diet but has made a concerted effort in order to watch his intake of fats and cholesterol with his diet along with working to control his blood sugar readings  We will continue to monitor his cholesterol profile  Patient will remain on try cor 145 mg per day and is intolerant to statin drugs  Anxiety  Patient states that he has continued control of his anxiety with present treatment  A major factor with increased anxiety was his wife's medical condition which is now stable  Patient will continue present medication and knows to call us if any problems with increased anxiety or stress       Diagnoses and all orders for this visit:    Type 2 diabetes mellitus without complication, without long-term current use of insulin ([facility] )  -     Basic metabolic panel;  Future  -     Hemoglobin A1C; Future  -     Microalbumin / creatinine urine ratio    Encounter for immunization  -     influenza vaccine, [id number], high-dose, PF 0 5 mL (FLUZONE HIGH-DOSE)    Benign essential hypertension  -     Basic metabolic panel; Future    Mixed hyperlipidemia    Anxiety          Subjective:      Patient ID: Wendy Ramachandran  is a 77 y o  male  72-year-old male with a history of multiple medical problems as outlined previously  Patient is here today for routine follow-up  Since his last visit patient has been seen by endocrinologist and restriction assist and he is working much harder on his diet in order to keep better control of his blood sugars which have shown some improvement since her last visit  Patient states in general physically he is feeling well and has no new medical complaints or concerns  He did have routine labs performed recently and we did discuss the results  The following portions of the patient's history were reviewed and updated as appropriate: He  has no past medical history on file  He   Patient Active Problem List    Diagnosis Date Noted    Acute pain of right knee 10/08/2018    Healthcare maintenance 05/22/2018    Diabetes ([address] ) 08/04/2014    Anxiety 09/20/2012    Benign essential hypertension 09/20/2012    Hyperlipidemia 09/20/2012     He  has no past surgical history on file  His family history is not on file  He  reports that he has never smoked  He has never used smokeless tobacco  His alcohol and drug histories are not on file    Current Outpatient Medications   Medication Sig Dispense Refill    aspirin 81 mg chewable tablet Chew 1 tablet daily      bisoprolol-hydrochlorothiazide (ZIAC) 5-6 25 MG per tablet TAKE 1 TABLET EVERY DAY 90 tablet 3    citalopram (CeleXA) 10 mg tablet TAKE 1 TABLET BY MOUTH EVERY DAY 90 tablet 3    fenofibrate (TRICOR) 145 mg tablet TAKE 1 TABLET EVERY DAY 90 tablet 3    glimepiride (AMARYL) 2 mg tablet TAKE 1 TABLET (2 MG TOTAL) BY MOUTH DAILY WITH BREAKFAST 90 tablet 3    glimepiride (AMARYL) 4 mg tablet Take 1 tablet with breakfast and 1/2 tablet with dinner 45 tablet 3    glucose blood (ACCU-CHEK GUIDE) test strip Use to test blood sugars twice daily as instructed 100 each 1    Lancets MISC by Does not apply route 2 (two) times a day      losartan (COZAAR) 50 mg tablet Take 1 tablet (50 mg total) by mouth daily 90 tablet 3    metFORMIN (GLUCOPHAGE-XR) 500 mg 24 hr tablet Take 2 tablets (1,000 mg total) by mouth 2 (two) times a day with meals 360 tablet 1    sitaGLIPtin (JANUVIA) 100 mg tablet Take 1 tablet (100 mg total) by mouth daily 30 tablet 4     No current facility-administered medications for this visit  Current Outpatient Medications on File Prior to Visit   Medication Sig    aspirin 81 mg chewable tablet Chew 1 tablet daily    bisoprolol-hydrochlorothiazide (ZIAC) 5-6 25 MG per tablet TAKE 1 TABLET EVERY DAY    citalopram (CeleXA) 10 mg tablet TAKE 1 TABLET BY MOUTH EVERY DAY    fenofibrate (TRICOR) 145 mg tablet TAKE 1 TABLET EVERY DAY    glimepiride (AMARYL) 2 mg tablet TAKE 1 TABLET (2 MG TOTAL) BY MOUTH DAILY WITH BREAKFAST    glimepiride (AMARYL) 4 mg tablet Take 1 tablet with breakfast and 1/2 tablet with dinner    glucose blood (ACCU-CHEK GUIDE) test strip Use to test blood sugars twice daily as instructed    Lancets MISC by Does not apply route 2 (two) times a day    losartan (COZAAR) 50 mg tablet Take 1 tablet (50 mg total) by mouth daily    metFORMIN (GLUCOPHAGE-XR) 500 mg 24 hr tablet Take 2 tablets (1,000 mg total) by mouth 2 (two) times a day with meals    sitaGLIPtin (JANUVIA) 100 mg tablet Take 1 tablet (100 mg total) by mouth daily     No current facility-administered medications on file prior to visit  He has No Known Allergies       Review of Systems   Constitutional: Negative  HENT: Negative  Eyes: Negative  Respiratory: Negative  Cardiovascular: Negative  Gastrointestinal: Negative  Endocrine: Negative      Musculoskeletal: Positive for arthralgias (Patient states he does have some minor arthritic aches and pains but nothing new or disabling)  Negative for back pain, gait problem, joint swelling, myalgias, neck pain and neck stiffness  Allergic/Immunologic: Negative  Hematological: Negative  Psychiatric/Behavioral: Negative  Objective:      /70   Pulse 63   Temp 98 2 °F (36 8 °C)   Resp 14   Ht 5' 11" (1 803 m)   Wt 99 1 kg (218 lb 6 4 oz)   SpO2 97%   BMI 30 46 kg/m²          Physical Exam   Constitutional: He is oriented to person, place, and time  He appears well-developed and well-nourished  No distress  Pleasant, articulate, overweight 26-year-old male who is awake alert no acute distress oriented x3   HENT:   Head: Normocephalic and atraumatic  Right Ear: External ear normal    Left Ear: External ear normal    Nose: Nose normal    Mouth/Throat: Oropharynx is clear and moist  No oropharyngeal exudate  Eyes: Pupils are equal, round, and reactive to light  Conjunctivae and EOM are normal  Right eye exhibits no discharge  Left eye exhibits no discharge  No scleral icterus  Neck: Normal range of motion  Neck supple  No JVD present  No tracheal deviation present  No thyromegaly present  Cardiovascular: Normal rate, regular rhythm, normal heart sounds and intact distal pulses  Exam reveals no gallop and no friction rub  No murmur heard  Pulmonary/Chest: Effort normal and breath sounds normal  No stridor  No respiratory distress  He has no wheezes  He has no rales  He exhibits no tenderness  Abdominal: Soft  Bowel sounds are normal  He exhibits no distension and no mass  There is no tenderness  There is no rebound and no guarding  No hernia  Obese   Musculoskeletal: Normal range of motion  He exhibits no edema, tenderness or deformity  Lymphadenopathy:     He has no cervical adenopathy  Neurological: He is alert and oriented to person, place, and time  He displays normal reflexes   No cranial nerve deficit or sensory deficit  Coordination normal    Skin: Skin is warm and dry  Capillary refill takes less than 2 seconds  No rash noted  He is not diaphoretic  No erythema  No pallor  Psychiatric: He has a normal mood and affect  His behavior is normal  Thought content normal    Nursing note and vitals reviewed

## 2019-10-07 NOTE — ASSESSMENT & PLAN NOTE
Patient's blood pressure showing excellent control  Patient will continue medication as previously and surveillance    We will continue to modify treatment as needed and will also continue to watch his renal function

## 2019-10-07 NOTE — ASSESSMENT & PLAN NOTE
Patient states that he has continued control of his anxiety with present treatment  A major factor with increased anxiety was his wife's medical condition which is now stable    Patient will continue present medication and knows to call us if any problems with increased anxiety or stress

## 2019-11-25 ENCOUNTER — OFFICE VISIT (OUTPATIENT)
Dept: ENDOCRINOLOGY | Facility: CLINIC | Age: 69
End: 2019-11-25
Payer: MEDICARE

## 2019-11-25 VITALS
BODY MASS INDEX: 29.54 KG/M2 | HEART RATE: 56 BPM | WEIGHT: 211 LBS | HEIGHT: 71 IN | DIASTOLIC BLOOD PRESSURE: 80 MMHG | SYSTOLIC BLOOD PRESSURE: 126 MMHG

## 2019-11-25 DIAGNOSIS — E11.9 TYPE 2 DIABETES MELLITUS WITHOUT COMPLICATION, WITHOUT LONG-TERM CURRENT USE OF INSULIN (HCC): Primary | ICD-10-CM

## 2019-11-25 DIAGNOSIS — E78.2 MIXED HYPERLIPIDEMIA: ICD-10-CM

## 2019-11-25 DIAGNOSIS — I10 ESSENTIAL HYPERTENSION: ICD-10-CM

## 2019-11-25 PROCEDURE — 99214 OFFICE O/P EST MOD 30 MIN: CPT | Performed by: PHYSICIAN ASSISTANT

## 2019-11-25 RX ORDER — GLIMEPIRIDE 2 MG/1
1 TABLET ORAL
Qty: 90 TABLET | Refills: 3
Start: 2019-11-25 | End: 2020-04-09 | Stop reason: SDUPTHER

## 2019-11-25 NOTE — PROGRESS NOTES
Patient Progress Note      CC: DM2      Referring Provider  Yuliya Gilbert, Do  [address]  [address] [place] [address], [address]     History of Present Illness:   Amanda Haq  is a 77 y o  male with a history of type 2 diabetes without long term use of insulin  Diabetes course has been stable  Complications of DM:  None reported  Denies recent illness or hospitalizations  Denies recent severe hypoglycemic or severe hyperglycemic episodes  Denies any issues with his current regimen  Home glucose monitoring: are performed regularly    Home blood glucose readings:   Before breakfast:  mg/dl  1 reading at 153 mg/dl in the evening and 1 reading at 68 mg/dl in the later afternoon    Current regimen:  Januvia 100 mg daily, metformin 1000 mg twice a day, glimepiride 2 mg with breakfast  compliant most of the time, denies any side effects from medications  Hypoglycemic episodes: Yes, infrequent  H/o of hypoglycemia causing hospitalization or Intervention such as glucagon injection  or ambulance call :  No  Hypoglycemia symptoms: dizziness and sweating  Treatment of hypoglycemia: candy    Medic alert tag: recommended: Yes    Diet: 3 meals per day, 1-2 snacks per day  Timing of meals is predictable  Diabetic diet compliance:  compliant most of the time  Activity: Daily activity is predictable: Yes  No routine exercise   Further diabetic ROS: no polyuria or polydipsia, no chest pain, dyspnea, no numbness, tingling or pain in extremities        Ophthamology: eye exam UTD, July 2019  Podiatry: foot exam UTD, October 2019    Has hypertension: on ACE inhibitor/ARB, compliant most of the time  Has hyperlipidemia: on fenofibrate- tolerating well, no myalgias  compliant most of the time, denies any side effects from medications    Thyroid disorders: No  History of pancreatitis: No    Patient Active Problem List   Diagnosis    Anxiety    Benign essential hypertension    [facility]    Hyperlipidemia  Healthcare maintenance    Acute pain of right knee      Past Medical History:   Diagnosis Date    DM (diabetes mellitus), type 2 (HCC)       Past Surgical History:   Procedure Laterality Date    KNEE SURGERY Right       Family History   Problem Relation Age of Onset    Diabetes unspecified Mother      Social History     Tobacco Use    Smoking status: Never Smoker    Smokeless tobacco: Never Used   Substance Use Topics    Alcohol use: Not on file     No Known Allergies      Current Outpatient Medications:     aspirin 81 mg chewable tablet, Chew 1 tablet daily, Disp: , Rfl:     bisoprolol-hydrochlorothiazide (ZIAC) 5-6 25 MG per tablet, TAKE 1 TABLET EVERY DAY, Disp: 90 tablet, Rfl: 3    citalopram (CeleXA) 10 mg tablet, TAKE 1 TABLET BY MOUTH EVERY DAY, Disp: 90 tablet, Rfl: 3    fenofibrate (TRICOR) 145 mg tablet, TAKE 1 TABLET EVERY DAY, Disp: 90 tablet, Rfl: 3    glimepiride (AMARYL) 2 mg tablet, Take 0 5 tablets (1 mg total) by mouth daily with breakfast, Disp: 90 tablet, Rfl: 3    glucose blood (ACCU-CHEK GUIDE) test strip, Use to test blood sugars twice daily as instructed, Disp: 100 each, Rfl: 1    Lancets MISC, by Does not apply route 2 (two) times a day, Disp: , Rfl:     losartan (COZAAR) 50 mg tablet, Take 1 tablet (50 mg total) by mouth daily, Disp: 90 tablet, Rfl: 3    metFORMIN (GLUCOPHAGE-XR) 500 mg 24 hr tablet, Take 2 tablets (1,000 mg total) by mouth 2 (two) times a day with meals, Disp: 360 tablet, Rfl: 1    sitaGLIPtin (JANUVIA) 100 mg tablet, Take 1 tablet (100 mg total) by mouth daily, Disp: 30 tablet, Rfl: 4    glimepiride (AMARYL) 4 mg tablet, Take 1 tablet with breakfast and 1/2 tablet with dinner (Patient not taking: Reported on 11/25/2019), Disp: 45 tablet, Rfl: 3  Review of Systems   Constitutional: Negative for activity change, appetite change, fatigue and unexpected weight change  HENT: Negative for trouble swallowing  Eyes: Negative for visual disturbance  Respiratory: Negative for shortness of breath  Cardiovascular: Negative for chest pain and palpitations  Gastrointestinal: Negative for constipation and diarrhea  Endocrine: Negative for polydipsia and polyuria  Musculoskeletal: Negative  Skin: Negative for wound  Neurological: Negative for numbness  Psychiatric/Behavioral: Negative  Physical Exam:  Body mass index is 29 43 kg/m²  /80   Pulse 56   Ht 5' 11" (1 803 m)   Wt 95 7 kg (211 lb)   BMI 29 43 kg/m²    Wt Readings from Last 3 Encounters:   11/25/19 95 7 kg (211 lb)   10/07/19 99 1 kg (218 lb 6 4 oz)   08/19/19 97 1 kg (214 lb)       Physical Exam   Constitutional: He appears well-developed and well-nourished  HENT:   Head: Normocephalic  Eyes: Pupils are equal, round, and reactive to light  EOM are normal  No scleral icterus  Neck: Neck supple  No thyromegaly present  Cardiovascular: Normal rate and regular rhythm  No murmur heard  Pulses:       Radial pulses are 2+ on the right side, and 2+ on the left side  Pulmonary/Chest: Effort normal and breath sounds normal  No respiratory distress  He has no wheezes  Neurological: He is alert  Skin: Skin is warm and dry  Psychiatric: He has a normal mood and affect  Nursing note and vitals reviewed  Patient's shoes and socks were not removed        Labs:   Component      Latest Ref Rng & Units 1/2/2019 7/8/2019   Glucose, Random      65 - 99 mg/dL 181 (H) 224 (H)   BUN      7 - 25 mg/dL 20 19   Creatinine      0 70 - 1 25 mg/dL 1 08 0 97   eGFR Non       > OR = 60 mL/min/1 73m2 71 80   eGFR       > OR = 60 mL/min/1 73m2 82 93   SL AMB BUN/CREATININE RATIO      6 - 22 (calc) NOT APPLICABLE NOT APPLICABLE   Sodium      135 - 146 mmol/L 137 133 (L)   Potassium      3 5 - 5 3 mmol/L 4 4 4 3   Chloride      98 - 110 mmol/L 100 102   CO2      20 - 32 mmol/L 27 24   SL AMB CALCIUM      8 6 - 10 3 mg/dL 9 4 9 0   Total Protein      6 1 - 8 1 g/dL     Albumin      3 6 - 5 1 g/dL     Globulin      1 9 - 3 7 g/dL (calc)     Albumin/Globulin Ratio      1 0 - 2 5 (calc)     TOTAL BILIRUBIN      0 2 - 1 2 mg/dL     Alkaline Phosphatase      40 - 115 U/L     AST      10 - 35 U/L     ALT      9 - 46 U/L     Cholesterol      <200 mg/dL     HDL      >40 mg/dL     Triglycerides      <150 mg/dL     LDL Direct      mg/dL (calc)     Chol HDLC Ratio      <5 0 (calc)     Non-HDL Cholesterol      <130 mg/dL (calc)     Hemoglobin A1C      <5 7 % of total Hgb 9 4 (H) 10 8 (H)   TSH W/RFX TO FREE T4      0 40 - 4 50 mIU/L       Component      Latest Ref Rng & Units 9/30/2019   Glucose, Random      65 - 99 mg/dL 104 (H)   BUN      7 - 25 mg/dL 19   Creatinine      0 70 - 1 25 mg/dL 1 02   eGFR Non       > OR = 60 mL/min/1 73m2 75   eGFR       > OR = 60 mL/min/1 73m2 87   SL AMB BUN/CREATININE RATIO      6 - 22 (calc) NOT APPLICABLE   Sodium      135 - 146 mmol/L 137   Potassium      3 5 - 5 3 mmol/L 4 2   Chloride      98 - 110 mmol/L 103   CO2      20 - 32 mmol/L 28   SL AMB CALCIUM      8 6 - 10 3 mg/dL 9 6   Total Protein      6 1 - 8 1 g/dL 6 4   Albumin      3 6 - 5 1 g/dL 4 5   Globulin      1 9 - 3 7 g/dL (calc) 1 9   Albumin/Globulin Ratio      1 0 - 2 5 (calc) 2 4   TOTAL BILIRUBIN      0 2 - 1 2 mg/dL 0 5   Alkaline Phosphatase      40 - 115 U/L 32 (L)   AST      10 - 35 U/L 14   ALT      9 - 46 U/L 23   Cholesterol      <200 mg/dL 144   HDL      >40 mg/dL 38 (L)   Triglycerides      <150 mg/dL 137   LDL Direct      mg/dL (calc) 82   Chol HDLC Ratio      <5 0 (calc) 3 8   Non-HDL Cholesterol      <130 mg/dL (calc) 106   Hemoglobin A1C      <5 7 % of total Hgb 7 9 (H)   TSH W/RFX TO FREE T4      0 40 - 4 50 mIU/L 0 85       Plan:    Diagnoses and all orders for this visit:    Type 2 diabetes mellitus without complication, without long-term current use of insulin (HCC)  HGA1C 7 9%  Due for A1C after 12/30/19    Treatment regimen: reduce glimepiride to 1 mg at breakfast due to occasional hypoglycemia in the afternoon  Continue metformin and Januvia  Discussed risks/complications associated with uncontrolled diabetes  Advised to adhere to diabetic diet, and recommended staying active/exercising routinely  Keep carbohydrates consistent to limit blood glucose fluctuations  Advised to call if blood sugars less than 70 mg/dl or over 300 mg/dl  Check blood glucose 2 times a day  Discussed symptoms and treatment of hypoglycemia  Recommended routine follow-up with podiatry and ophthalmology  Send log in 2 weeks  Ordered blood work to complete prior to next visit  -     Hemoglobin A1C; Future  -     Basic metabolic panel; Future  -     Microalbumin / creatinine urine ratio; Future  -     glimepiride (AMARYL) 2 mg tablet; Take 0 5 tablets (1 mg total) by mouth daily with breakfast  -     Hemoglobin A1C; Future  -     Basic metabolic panel; Future    Essential hypertension  Blood pressure well controlled, continue current treatment  -     Basic metabolic panel; Future  -     Basic metabolic panel; Future    Mixed hyperlipidemia  LDL 82  Continue fenofibrate      Discussed with the patient diagnosis and treatment and all questions fully answered  He will call me if any problems arise  Counseled patient on diagnostic results, prognosis, risk and benefit of treatment options, instruction for management, importance of treatment compliance, risk factor reduction and impressions        Angy Oliveira PA-C

## 2019-11-25 NOTE — PATIENT INSTRUCTIONS
Hypoglycemia instructions   Gray Quinteros   11/25/2019  [id number]    Low Blood Sugar    Steps to treat low blood sugar  1  Test blood sugar if you have symptoms of low blood sugar:   Low Blood Sugar Symptoms:  o Sweaty  o Dizzy  o Rapid heartbeat  o Shaky  o Bad mood  o Hungry      2  Treat blood sugar less than 70 with 15 grams of fast-acting carbohydrate:   Examples of 15 grams Fast-Acting Carbohydrate:  o 4 oz juice  o 4 oz regular soda  o 3-4 glucose tablets (chew)  o 3-4 hard candies (chew)          3  Wait 15 minutes and test your blood sugar again     4   If blood sugar is less than 100, repeat steps 2-3     5  When your blood sugar is 100 or more, eat a snack if it will be longer than one hour until your next meal  The snack should be 15 grams of carbohydrate and a protein:   Examples of snacks:  o ½ sandwich  o 6 crackers with cheese  o Piece of fruit with cheese or peanut butter  o 6 crackers with peanut butter

## 2019-11-26 DIAGNOSIS — I10 ESSENTIAL HYPERTENSION: ICD-10-CM

## 2019-11-26 RX ORDER — LOSARTAN POTASSIUM 50 MG/1
TABLET ORAL
Qty: 90 TABLET | Refills: 3 | Status: SHIPPED | OUTPATIENT
Start: 2019-11-26 | End: 2020-06-05 | Stop reason: SDUPTHER

## 2020-02-08 LAB — PSA SERPL-MCNC: 0.7 NG/ML

## 2020-02-10 ENCOUNTER — OFFICE VISIT (OUTPATIENT)
Dept: INTERNAL MEDICINE CLINIC | Facility: CLINIC | Age: 70
End: 2020-02-10
Payer: MEDICARE

## 2020-02-10 VITALS
HEIGHT: 71 IN | BODY MASS INDEX: 28.84 KG/M2 | DIASTOLIC BLOOD PRESSURE: 74 MMHG | TEMPERATURE: 97.5 F | WEIGHT: 206 LBS | OXYGEN SATURATION: 98 % | HEART RATE: 58 BPM | SYSTOLIC BLOOD PRESSURE: 126 MMHG

## 2020-02-10 DIAGNOSIS — E78.2 MIXED HYPERLIPIDEMIA: ICD-10-CM

## 2020-02-10 DIAGNOSIS — I10 BENIGN ESSENTIAL HYPERTENSION: ICD-10-CM

## 2020-02-10 DIAGNOSIS — E11.9 TYPE 2 DIABETES MELLITUS WITHOUT COMPLICATION, WITHOUT LONG-TERM CURRENT USE OF INSULIN (HCC): ICD-10-CM

## 2020-02-10 DIAGNOSIS — Z12.11 COLON CANCER SCREENING: Primary | ICD-10-CM

## 2020-02-10 DIAGNOSIS — J01.91 ACUTE RECURRENT SINUSITIS, UNSPECIFIED LOCATION: ICD-10-CM

## 2020-02-10 DIAGNOSIS — E11.649 UNCONTROLLED TYPE 2 DIABETES MELLITUS WITH HYPOGLYCEMIA, UNSPECIFIED HYPOGLYCEMIA COMA STATUS (HCC): ICD-10-CM

## 2020-02-10 PROBLEM — E66.3 OVERWEIGHT: Status: ACTIVE | Noted: 2020-02-10

## 2020-02-10 PROBLEM — J01.90 ACUTE SINUSITIS: Status: ACTIVE | Noted: 2017-12-29

## 2020-02-10 PROCEDURE — 1160F RVW MEDS BY RX/DR IN RCRD: CPT | Performed by: INTERNAL MEDICINE

## 2020-02-10 PROCEDURE — 3074F SYST BP LT 130 MM HG: CPT | Performed by: INTERNAL MEDICINE

## 2020-02-10 PROCEDURE — 99214 OFFICE O/P EST MOD 30 MIN: CPT | Performed by: INTERNAL MEDICINE

## 2020-02-10 PROCEDURE — 1036F TOBACCO NON-USER: CPT | Performed by: INTERNAL MEDICINE

## 2020-02-10 PROCEDURE — 4040F PNEUMOC VAC/ADMIN/RCVD: CPT | Performed by: INTERNAL MEDICINE

## 2020-02-10 PROCEDURE — 3008F BODY MASS INDEX DOCD: CPT | Performed by: INTERNAL MEDICINE

## 2020-02-10 PROCEDURE — 2022F DILAT RTA XM EVC RTNOPTHY: CPT | Performed by: INTERNAL MEDICINE

## 2020-02-10 PROCEDURE — 3078F DIAST BP <80 MM HG: CPT | Performed by: INTERNAL MEDICINE

## 2020-02-10 RX ORDER — AMOXICILLIN 500 MG/1
500 CAPSULE ORAL EVERY 8 HOURS SCHEDULED
Qty: 21 CAPSULE | Refills: 0 | Status: SHIPPED | OUTPATIENT
Start: 2020-02-10 | End: 2020-02-17

## 2020-02-10 NOTE — ASSESSMENT & PLAN NOTE
With BMI patient is considered overweight  Patient is already been working to decrease his caloric intake and has lost some weight since his last visit and is commended for this  Patient was also told the importance also starting some type of regular exercise program in order to burn off calories and lose weight    We will check this with his next visit

## 2020-02-10 NOTE — ASSESSMENT & PLAN NOTE
Patient admits that he is not always conscious of his intake of fats and cholesterol with his diet  He was told to watch this carefully and we will check a lipid profile with his next visit  Patient remains on fenofibrate    If cholesterol is still elevated consider adding on a statin

## 2020-02-10 NOTE — ASSESSMENT & PLAN NOTE
Blood pressure is controlled  We did give the patient a slip to check on his renal function prior to the visit today but again the lab did not perform this  We will check a basic metabolic profile now and again with his next visit  Patient will continue present medication

## 2020-02-10 NOTE — PROGRESS NOTES
Assessment/Plan:    Uncontrolled type 2 diabetes mellitus with hypoglycemia ([facility] [address] )    Lab Results   Component Value Date    HGBA1C 7 9 (H) 09/30/2019    Patient was given a slip for lab testing looking for a recheck of hemoglobin A1c, fasting blood sugar, urine for microalbumin  The lab did not perform the test as requested  Patient was given another slip today to have the labs test performed within the next few days  We will call the patient with the results and whether further treatment is needed  Patient is very proud of the fact that he has lost approximately 5 lb since his last visit  Patient was told to continue to work with his caloric restriction, weight loss, improve his exercise tolerance    Acute sinusitis  Patient does have a history of recurrent sinus infections  He states further that over the past few days he has been having a lot of problems with a thick mucus to posterior airway, postnasal drip  No fever or chills  Some slight nasal congestion with a yellow mucus  On evaluation patient has enlarged red nasal turbinates bilaterally  Patient does have a history of recurrent sinus infections  He will be started on amoxicillin 500 mg p o  t i d  for 7 days and told to call if not improving  The patient was also told to keep himself well hydrated  Benign essential hypertension  Blood pressure is controlled  We did give the patient a slip to check on his renal function prior to the visit today but again the lab did not perform this  We will check a basic metabolic profile now and again with his next visit  Patient will continue present medication  Hyperlipidemia  Patient admits that he is not always conscious of his intake of fats and cholesterol with his diet  He was told to watch this carefully and we will check a lipid profile with his next visit  Patient remains on fenofibrate    If cholesterol is still elevated consider adding on a statin       Diagnoses and all orders for this visit:    Colon cancer screening  -     Occult Blood, Fecal Immunochemical; Future    Type 2 diabetes mellitus without complication, without long-term current use of insulin (HCC)  -     Hemoglobin A1C; Future  -     Microalbumin / creatinine urine ratio  -     Occult Blood, Fecal Immunochemical; Future  -     Comprehensive metabolic panel; Future  -     CBC and differential; Future  -     Lipid panel; Future  -     TSH, 3rd generation with Free T4 reflex; Future  -     Hemoglobin A1C; Future  -     Microalbumin / creatinine urine ratio    Benign essential hypertension  -     Basic metabolic panel; Future  -     Comprehensive metabolic panel; Future  -     CBC and differential; Future  -     TSH, 3rd generation with Free T4 reflex; Future    Mixed hyperlipidemia  -     Comprehensive metabolic panel; Future  -     CBC and differential; Future  -     Lipid panel; Future    Uncontrolled type 2 diabetes mellitus with hypoglycemia, unspecified hypoglycemia coma status (HCC)  -     Microalbumin / creatinine urine ratio    Acute recurrent sinusitis, unspecified location  -     amoxicillin (AMOXIL) 500 mg capsule; Take 1 capsule (500 mg total) by mouth every 8 (eight) hours for 7 days          Subjective:      Patient ID: Michelle Maurice  is a 77 y o  male  The 70-year-old male with a history of hypertension, hyperlipidemia, diabetes mellitus type 2 which is poorly controlled  Patient is here today for routine follow-up  He had a slip for extensive lab testing including a fasting blood sugar, hemoglobin A1c, urine for microalbumin to be performed at the lab but the lab a neglected to have these performed  Patient states in general his only problem at this point time is some nasal congestion, postnasal drip, thick mucus from the nares  He denies any fever or chills    He is not had any cough or mucus production with cough      The following portions of the patient's history were reviewed and updated as appropriate: He  has a past medical history of DM (diabetes mellitus), type 2 ([facility] [address] )  He   Patient Active Problem List    Diagnosis Date Noted    Acute pain of right knee 10/08/2018    Healthcare maintenance 05/22/2018    Acute sinusitis 12/29/2017    Uncontrolled type 2 diabetes mellitus with hypoglycemia ([facility] ) 08/04/2014    Anxiety 09/20/2012    Benign essential hypertension 09/20/2012    Hyperlipidemia 09/20/2012     He  has a past surgical history that includes Knee surgery (Right)  His family history includes Diabetes unspecified in his mother  He  reports that he has never smoked  He has never used smokeless tobacco  His alcohol and drug histories are not on file  Current Outpatient Medications   Medication Sig Dispense Refill    aspirin 81 mg chewable tablet Chew 1 tablet daily      bisoprolol-hydrochlorothiazide (ZIAC) 5-6 25 MG per tablet TAKE 1 TABLET EVERY DAY 90 tablet 3    citalopram (CeleXA) 10 mg tablet TAKE 1 TABLET BY MOUTH EVERY DAY 90 tablet 3    fenofibrate (TRICOR) 145 mg tablet TAKE 1 TABLET EVERY DAY 90 tablet 3    glimepiride (AMARYL) 2 mg tablet Take 0 5 tablets (1 mg total) by mouth daily with breakfast 90 tablet 3    glucose blood (ACCU-CHEK GUIDE) test strip Use to test blood sugars twice daily as instructed 100 each 1    Lancets MISC by Does not apply route 2 (two) times a day      losartan (COZAAR) 50 mg tablet TAKE 1 TABLET BY MOUTH EVERY DAY 90 tablet 3    metFORMIN (GLUCOPHAGE-XR) 500 mg 24 hr tablet Take 2 tablets (1,000 mg total) by mouth 2 (two) times a day with meals 360 tablet 1    sitaGLIPtin (JANUVIA) 100 mg tablet Take 1 tablet (100 mg total) by mouth daily 30 tablet 4    amoxicillin (AMOXIL) 500 mg capsule Take 1 capsule (500 mg total) by mouth every 8 (eight) hours for 7 days 21 capsule 0     No current facility-administered medications for this visit        Current Outpatient Medications on File Prior to Visit   Medication Sig    aspirin 81 mg chewable tablet Chew 1 tablet daily    bisoprolol-hydrochlorothiazide (ZIAC) 5-6 25 MG per tablet TAKE 1 TABLET EVERY DAY    citalopram (CeleXA) 10 mg tablet TAKE 1 TABLET BY MOUTH EVERY DAY    fenofibrate (TRICOR) 145 mg tablet TAKE 1 TABLET EVERY DAY    glimepiride (AMARYL) 2 mg tablet Take 0 5 tablets (1 mg total) by mouth daily with breakfast    glucose blood (ACCU-CHEK GUIDE) test strip Use to test blood sugars twice daily as instructed    Lancets MISC by Does not apply route 2 (two) times a day    losartan (COZAAR) 50 mg tablet TAKE 1 TABLET BY MOUTH EVERY DAY    metFORMIN (GLUCOPHAGE-XR) 500 mg 24 hr tablet Take 2 tablets (1,000 mg total) by mouth 2 (two) times a day with meals    sitaGLIPtin (JANUVIA) 100 mg tablet Take 1 tablet (100 mg total) by mouth daily    [DISCONTINUED] glimepiride (AMARYL) 4 mg tablet Take 1 tablet with breakfast and 1/2 tablet with dinner (Patient not taking: Reported on 11/25/2019)     No current facility-administered medications on file prior to visit  He has No Known Allergies       Review of Systems   Constitutional: Negative  HENT: Positive for congestion (Patient admits that he has recently had some problems with nasal congestion, and greenish nasal discharge), postnasal drip and rhinorrhea  Negative for dental problem, drooling, ear discharge, ear pain, facial swelling, hearing loss, mouth sores, nosebleeds, sinus pressure, sinus pain, sneezing, sore throat, tinnitus, trouble swallowing and voice change  Eyes: Negative  Cardiovascular: Negative  Gastrointestinal: Negative  Endocrine: Negative  Genitourinary: Negative  Musculoskeletal: Negative  Allergic/Immunologic: Negative  Neurological: Negative  Hematological: Negative  Psychiatric/Behavioral: Negative            Objective:      /74   Pulse 58   Temp 97 5 °F (36 4 °C)   Ht 5' 11" (1 803 m)   Wt 93 4 kg (206 lb)   SpO2 98%   BMI 28 73 kg/m²          Physical Exam   Constitutional: He is oriented to person, place, and time  He appears well-developed and well-nourished  No distress  Pleasant, overweight 77year old male who is awake alert no acute distress and oriented x3   HENT:   Head: Normocephalic and atraumatic  Right Ear: External ear normal    Left Ear: External ear normal    Mouth/Throat: Oropharyngeal exudate present  Patient has a diffuse erythema to nasal turbinates bilaterally, thick yellow nasal discharge, erythema to posterior airway with a thick yellow postnasal drip   Eyes: Pupils are equal, round, and reactive to light  Conjunctivae and EOM are normal  Right eye exhibits no discharge  Left eye exhibits no discharge  No scleral icterus  Neck: Normal range of motion  Neck supple  No JVD present  No tracheal deviation present  No thyromegaly present  Cardiovascular: Normal rate, regular rhythm, normal heart sounds and intact distal pulses  Exam reveals no gallop and no friction rub  No murmur heard  Pulmonary/Chest: Effort normal and breath sounds normal  No stridor  No respiratory distress  He has no wheezes  He has no rales  He exhibits no tenderness  Abdominal: Soft  Bowel sounds are normal  He exhibits no distension and no mass  There is no tenderness  There is no rebound and no guarding  No hernia  Musculoskeletal: Normal range of motion  He exhibits no edema, tenderness or deformity  Lymphadenopathy:     He has no cervical adenopathy  Neurological: He is alert and oriented to person, place, and time  He displays normal reflexes  No cranial nerve deficit or sensory deficit  He exhibits normal muscle tone  Coordination normal    Skin: Skin is warm and dry  Capillary refill takes less than 2 seconds  No rash noted  He is not diaphoretic  No erythema  No pallor  Psychiatric: He has a normal mood and affect  His behavior is normal  Judgment and thought content normal    Nursing note and vitals reviewed  BMI Counseling: Body mass index is 28 73 kg/m²  The BMI is above normal  Nutrition recommendations include reducing portion sizes, decreasing overall calorie intake, 3-5 servings of fruits/vegetables daily, consuming healthier snacks, increasing intake of lean protein, reducing intake of saturated fat and trans fat and reducing intake of cholesterol  Exercise recommendations include moderate aerobic physical activity for 150 minutes/week

## 2020-02-10 NOTE — ASSESSMENT & PLAN NOTE
Patient does have a history of recurrent sinus infections  He states further that over the past few days he has been having a lot of problems with a thick mucus to posterior airway, postnasal drip  No fever or chills  Some slight nasal congestion with a yellow mucus  On evaluation patient has enlarged red nasal turbinates bilaterally  Patient does have a history of recurrent sinus infections  He will be started on amoxicillin 500 mg p o  t i d  for 7 days and told to call if not improving  The patient was also told to keep himself well hydrated

## 2020-02-10 NOTE — PATIENT INSTRUCTIONS
Calorie Counting Diet   WHAT YOU NEED TO KNOW:   What is a calorie counting diet? It is a meal plan based on counting calories each day to reach a healthy body weight  You will need to eat fewer calories if you are trying to lose weight  Weight loss may decrease your risk for certain health problems or improve your health if you have health problems  Some of these health problems include heart disease, high blood pressure, and diabetes  What foods should I avoid? Your dietitian will tell you if you need to avoid certain foods based on your body weight and health condition  You may need to avoid high-fat foods if you are at risk for or have heart disease  You may need to eat fewer foods from the breads and starches food group if you have diabetes  How many calories are in foods? The following is a list of foods and drinks with the approximate number of calories in each  Check the food label to find the exact number of calories  A dietitian can tell you how many calories you should have from each food group each day    · Carbohydrate:      ¨ ½ of a 3-inch bagel, 1 slice of bread, or ½ of a hamburger bun or hot dog bun (80)    ¨ 1 (8-inch) flour tortilla or ½ cup of cooked rice (100)    ¨ 1 (6-inch) corn tortilla (80)    ¨ 1 (6-inch) pancake or 1 cup of bran flakes cereal (110)    ¨ ½ cup of cooked cereal (80)    ¨ ½ cup of cooked pasta (85)    ¨ 1 ounce of pretzels (100)    ¨ 3 cups of air-popped popcorn without butter or oil (80)    · Dairy:      ¨ 1 cup of skim or 1% milk (90)    ¨ 1 cup of 2% milk (120)    ¨ 1 cup of whole milk (160)    ¨ 1 cup of 2% chocolate milk (220)    ¨ 1 ounce of low-fat cheese with 3 grams of fat per ounce (70)    ¨ 1 ounce of cheddar cheese (114)    ¨ ½ cup of 1% fat cottage cheese (80)    ¨ 1 cup of plain or sugar-free, fat-free yogurt (90)    · Protein foods:      ¨ 3 ounces of fish (not breaded or fried) (95)    ¨ 3 ounces of breaded, fried fish (195)    ¨ ¾ cup of tuna canned in water (105)    ¨ 3 ounces of chicken breast without skin (105)    ¨ 1 fried chicken breast with skin (350)    ¨ ¼ cup of fat free egg substitute (40)    ¨ 1 large egg (75)    ¨ 3 ounces of lean beef or pork (165)    ¨ 3 ounces of fried pork chop or ham (185)    ¨ ½ cup of cooked dried beans, such as kidney, barney, lentils, or navy (115)    ¨ 3 ounces of bologna or lunch meat (225)    ¨ 2 links of breakfast sausage (140)    · Vegetables:      ¨ ½ cup of sliced mushrooms (10)    ¨ 1 cup of salad greens, such as lettuce, spinach, or gila (15)    ¨ ½ cup of steamed asparagus (20)    ¨ ½ cup of cooked summer squash, zucchini squash, or green or wax beans (25)    ¨ 1 cup of broccoli or cauliflower florets, or 1 medium tomato (25)    ¨ 1 large raw carrot or ½ cup of cooked carrots (40)    ¨ ? of a medium cucumber or 1 stalk of celery (5)    ¨ 1 small baked potato (160)    ¨ 1 cup of breaded, fried vegetables (230)    · Fruit:      ¨ 1 (6-inch) banana (55)     ¨ ½ of a 4-inch grapefruit (55)    ¨ 15 grapes (60)    ¨ 1 medium orange or apple (70)    ¨ 1 large peach (65)    ¨ 1 cup of fresh pineapple chunks (75)    ¨ 1 cup of melon cubes (50)    ¨ 1¼ cups of whole strawberries (45)    ¨ ½ cup of fruit canned in juice (55)    ¨ ½ cup of fruit canned in heavy syrup (110)    ¨ ?  cup of raisins (130)    ¨ ½ cup of unsweetened fruit juice (60)    ¨ ½ cup of grape, cranberry, or prune juice (90)    · Fat:      ¨ 10 peanuts or 2 teaspoons of peanut butter (55)    ¨ 2 tablespoons of avocado or 1 tablespoon of regular salad dressing (45)    ¨ 2 slices of bryant (90)    ¨ 1 teaspoon of oil, such as safflower, canola, corn, or olive oil (45)    ¨ 2 teaspoons of low-fat margarine, or 1 tablespoon of low-fat mayonnaise (50)    ¨ 1 teaspoon of regular margarine (40)    ¨ 1 tablespoon of regular mayonnaise (135)    ¨ 1 tablespoon of cream cheese or 2 tablespoons of low-fat cream cheese (45)    ¨ 2 tablespoons of vegetable shortening (215)    · Dessert and sweets:      ¨ 8 animal crackers or 5 vanilla wafers (80)    ¨ 1 frozen fruit juice bar (80)    ¨ ½ cup of ice milk or low-fat frozen yogurt (90)    ¨ ½ cup of sherbet or sorbet (125)    ¨ ½ cup of sugar-free pudding or custard (60)    ¨ ½ cup of ice cream (140)    ¨ ½ cup of pudding or custard (175)    ¨ 1 (2-inch) square chocolate brownie (185)    · Combination foods:      ¨ Bean burrito made with an 8-inch tortilla, without cheese (275)    ¨ Chicken breast sandwich with lettuce and tomato (325)    ¨ 1 cup of chicken noodle soup (60)    ¨ 1 beef taco (175)    ¨ Regular hamburger with lettuce and tomato (310)    ¨ Regular cheeseburger with lettuce and tomato (410)     ¨ ¼ of a 12-inch cheese pizza (280)    ¨ Fried fish sandwich with lettuce and tomato (425)    ¨ Hot dog and bun (275)    ¨ 1½ cups of macaroni and cheese (310)    ¨ Taco salad with a fried tortilla shell (870)    · Low-calorie foods:      ¨ 1 tablespoon of ketchup or 1 tablespoon of fat free sour cream (15)    ¨ 1 teaspoon of mustard (5)    ¨ ¼ cup of salsa (20)    ¨ 1 large dill pickle (15)    ¨ 1 tablespoon of fat free salad dressing (10)    ¨ 2 teaspoons of low-sugar, light jam or jelly, or 1 tablespoon of sugar-free syrup (15)    ¨ 1 sugar-free popsicle (15)    ¨ 1 cup of club soda, seltzer water, or diet soda (0)  CARE AGREEMENT:   You have the right to help plan your care  Discuss treatment options with your caregivers to decide what care you want to receive  You always have the right to refuse treatment  The above information is an  only  It is not intended as medical advice for individual conditions or treatments  Talk to your doctor, nurse or pharmacist before following any medical regimen to see if it is safe and effective for you  © 2017 [employer] Information is for End User's use only and may not be sold, redistributed or otherwise used for commercial purposes   All illustrations and images included in CareNotes® are the copyrighted property of [employer]  or [employer]

## 2020-02-13 LAB
ALBUMIN/CREAT UR: 4 MCG/MG CREAT
BUN SERPL-MCNC: 18 MG/DL (ref 7–25)
BUN/CREAT SERPL: NORMAL (CALC) (ref 6–22)
CALCIUM SERPL-MCNC: 9.7 MG/DL (ref 8.6–10.3)
CHLORIDE SERPL-SCNC: 103 MMOL/L (ref 98–110)
CO2 SERPL-SCNC: 29 MMOL/L (ref 20–32)
CREAT SERPL-MCNC: 1.09 MG/DL (ref 0.7–1.25)
CREAT UR-MCNC: 135 MG/DL (ref 20–320)
GLUCOSE SERPL-MCNC: 86 MG/DL (ref 65–99)
HBA1C MFR BLD: 6.2 % OF TOTAL HGB
MICROALBUMIN UR-MCNC: 0.5 MG/DL
POTASSIUM SERPL-SCNC: 4.2 MMOL/L (ref 3.5–5.3)
SL AMB EGFR AFRICAN AMERICAN: 80 ML/MIN/1.73M2
SL AMB EGFR NON AFRICAN AMERICAN: 69 ML/MIN/1.73M2
SODIUM SERPL-SCNC: 139 MMOL/L (ref 135–146)

## 2020-02-27 ENCOUNTER — DOCUMENTATION (OUTPATIENT)
Dept: ENDOCRINOLOGY | Facility: CLINIC | Age: 70
End: 2020-02-27

## 2020-04-02 LAB
CHOLEST SERPL-MCNC: 177 MG/DL
CHOLEST/HDLC SERPL: 4.7 (CALC)
HDLC SERPL-MCNC: 38 MG/DL
LDLC SERPL CALC-MCNC: 112 MG/DL (CALC)
NONHDLC SERPL-MCNC: 139 MG/DL (CALC)
TRIGL SERPL-MCNC: 158 MG/DL

## 2020-04-09 ENCOUNTER — TELEMEDICINE (OUTPATIENT)
Dept: ENDOCRINOLOGY | Facility: CLINIC | Age: 70
End: 2020-04-09
Payer: MEDICARE

## 2020-04-09 DIAGNOSIS — I10 ESSENTIAL HYPERTENSION: ICD-10-CM

## 2020-04-09 DIAGNOSIS — E78.2 MIXED HYPERLIPIDEMIA: ICD-10-CM

## 2020-04-09 DIAGNOSIS — E11.9 TYPE 2 DIABETES MELLITUS WITHOUT COMPLICATION, WITHOUT LONG-TERM CURRENT USE OF INSULIN (HCC): Primary | ICD-10-CM

## 2020-04-09 DIAGNOSIS — E55.9 VITAMIN D DEFICIENCY: ICD-10-CM

## 2020-04-09 PROCEDURE — 99442 PR PHYS/QHP TELEPHONE EVALUATION 11-20 MIN: CPT | Performed by: INTERNAL MEDICINE

## 2020-04-09 RX ORDER — GLIMEPIRIDE 2 MG/1
TABLET ORAL
Qty: 90 TABLET | Refills: 0
Start: 2020-04-09 | End: 2020-04-09 | Stop reason: DRUGHIGH

## 2020-04-14 DIAGNOSIS — E11.9 TYPE 2 DIABETES MELLITUS WITHOUT COMPLICATION, WITHOUT LONG-TERM CURRENT USE OF INSULIN (HCC): ICD-10-CM

## 2020-04-14 RX ORDER — METFORMIN HYDROCHLORIDE 500 MG/1
TABLET, EXTENDED RELEASE ORAL
Qty: 360 TABLET | Refills: 1 | Status: SHIPPED | OUTPATIENT
Start: 2020-04-14 | End: 2021-05-17

## 2020-05-27 LAB
ALBUMIN SERPL-MCNC: 4.7 G/DL (ref 3.6–5.1)
ALBUMIN/GLOB SERPL: 2.1 (CALC) (ref 1–2.5)
ALP SERPL-CCNC: 34 U/L (ref 35–144)
ALT SERPL-CCNC: 16 U/L (ref 9–46)
AST SERPL-CCNC: 17 U/L (ref 10–35)
BASOPHILS # BLD AUTO: 32 CELLS/UL (ref 0–200)
BASOPHILS NFR BLD AUTO: 0.5 %
BILIRUB SERPL-MCNC: 0.6 MG/DL (ref 0.2–1.2)
BUN SERPL-MCNC: 20 MG/DL (ref 7–25)
BUN/CREAT SERPL: ABNORMAL (CALC) (ref 6–22)
CALCIUM SERPL-MCNC: 9.6 MG/DL (ref 8.6–10.3)
CHLORIDE SERPL-SCNC: 104 MMOL/L (ref 98–110)
CO2 SERPL-SCNC: 28 MMOL/L (ref 20–32)
CREAT SERPL-MCNC: 0.98 MG/DL (ref 0.7–1.25)
EOSINOPHIL # BLD AUTO: 132 CELLS/UL (ref 15–500)
EOSINOPHIL NFR BLD AUTO: 2.1 %
ERYTHROCYTE [DISTWIDTH] IN BLOOD BY AUTOMATED COUNT: 13.7 % (ref 11–15)
GLOBULIN SER CALC-MCNC: 2.2 G/DL (CALC) (ref 1.9–3.7)
GLUCOSE SERPL-MCNC: 91 MG/DL (ref 65–99)
HBA1C MFR BLD: 6.9 % OF TOTAL HGB
HCT VFR BLD AUTO: 49.1 % (ref 38.5–50)
HGB BLD-MCNC: 16.4 G/DL (ref 13.2–17.1)
LYMPHOCYTES # BLD AUTO: 1474 CELLS/UL (ref 850–3900)
LYMPHOCYTES NFR BLD AUTO: 23.4 %
MCH RBC QN AUTO: 28.8 PG (ref 27–33)
MCHC RBC AUTO-ENTMCNC: 33.4 G/DL (ref 32–36)
MCV RBC AUTO: 86.1 FL (ref 80–100)
MONOCYTES # BLD AUTO: 819 CELLS/UL (ref 200–950)
MONOCYTES NFR BLD AUTO: 13 %
NEUTROPHILS # BLD AUTO: 3843 CELLS/UL (ref 1500–7800)
NEUTROPHILS NFR BLD AUTO: 61 %
PLATELET # BLD AUTO: 174 THOUSAND/UL (ref 140–400)
PMV BLD REES-ECKER: 10.7 FL (ref 7.5–12.5)
POTASSIUM SERPL-SCNC: 4.3 MMOL/L (ref 3.5–5.3)
PROT SERPL-MCNC: 6.9 G/DL (ref 6.1–8.1)
RBC # BLD AUTO: 5.7 MILLION/UL (ref 4.2–5.8)
SERVICE CMNT-IMP: 7600
SL AMB EGFR AFRICAN AMERICAN: 91 ML/MIN/1.73M2
SL AMB EGFR NON AFRICAN AMERICAN: 79 ML/MIN/1.73M2
SODIUM SERPL-SCNC: 139 MMOL/L (ref 135–146)
TSH SERPL-ACNC: 0.65 MIU/L (ref 0.4–4.5)
WBC # BLD AUTO: 6.3 THOUSAND/UL (ref 3.8–10.8)

## 2020-06-05 DIAGNOSIS — I10 ESSENTIAL HYPERTENSION: ICD-10-CM

## 2020-06-05 DIAGNOSIS — F41.9 ANXIETY: ICD-10-CM

## 2020-06-05 DIAGNOSIS — E11.9 TYPE 2 DIABETES MELLITUS WITHOUT COMPLICATION, WITHOUT LONG-TERM CURRENT USE OF INSULIN (HCC): ICD-10-CM

## 2020-06-05 RX ORDER — FENOFIBRATE 145 MG/1
145 TABLET, COATED ORAL DAILY
Qty: 90 TABLET | Refills: 3 | Status: SHIPPED | OUTPATIENT
Start: 2020-06-05 | End: 2021-03-03

## 2020-06-05 RX ORDER — BISOPROLOL FUMARATE AND HYDROCHLOROTHIAZIDE 5; 6.25 MG/1; MG/1
1 TABLET ORAL DAILY
Qty: 90 TABLET | Refills: 3 | Status: SHIPPED | OUTPATIENT
Start: 2020-06-05 | End: 2021-06-28 | Stop reason: SDUPTHER

## 2020-06-05 RX ORDER — CITALOPRAM 10 MG/1
10 TABLET ORAL DAILY
Qty: 90 TABLET | Refills: 3 | Status: SHIPPED | OUTPATIENT
Start: 2020-06-05 | End: 2021-04-02 | Stop reason: SDUPTHER

## 2020-06-05 RX ORDER — LOSARTAN POTASSIUM 50 MG/1
50 TABLET ORAL DAILY
Qty: 90 TABLET | Refills: 3 | Status: SHIPPED | OUTPATIENT
Start: 2020-06-05 | End: 2020-06-11

## 2020-06-11 DIAGNOSIS — I10 ESSENTIAL HYPERTENSION: ICD-10-CM

## 2020-06-11 RX ORDER — LOSARTAN POTASSIUM 50 MG/1
TABLET ORAL
Qty: 90 TABLET | Refills: 3 | Status: SHIPPED | OUTPATIENT
Start: 2020-06-11 | End: 2021-03-03

## 2020-07-13 ENCOUNTER — OFFICE VISIT (OUTPATIENT)
Dept: INTERNAL MEDICINE CLINIC | Facility: CLINIC | Age: 70
End: 2020-07-13
Payer: MEDICARE

## 2020-07-13 VITALS
HEIGHT: 71 IN | SYSTOLIC BLOOD PRESSURE: 156 MMHG | TEMPERATURE: 97.7 F | BODY MASS INDEX: 29.26 KG/M2 | DIASTOLIC BLOOD PRESSURE: 84 MMHG | HEART RATE: 64 BPM | WEIGHT: 209 LBS | OXYGEN SATURATION: 98 %

## 2020-07-13 DIAGNOSIS — Z00.00 HEALTHCARE MAINTENANCE: ICD-10-CM

## 2020-07-13 DIAGNOSIS — E11.649 UNCONTROLLED TYPE 2 DIABETES MELLITUS WITH HYPOGLYCEMIA WITHOUT COMA (HCC): Primary | ICD-10-CM

## 2020-07-13 DIAGNOSIS — I10 BENIGN ESSENTIAL HYPERTENSION: ICD-10-CM

## 2020-07-13 DIAGNOSIS — Z12.11 COLON CANCER SCREENING: ICD-10-CM

## 2020-07-13 DIAGNOSIS — E78.2 MIXED HYPERLIPIDEMIA: ICD-10-CM

## 2020-07-13 DIAGNOSIS — F41.9 ANXIETY: ICD-10-CM

## 2020-07-13 DIAGNOSIS — E66.3 OVERWEIGHT: ICD-10-CM

## 2020-07-13 DIAGNOSIS — Z12.5 PROSTATE CANCER SCREENING: ICD-10-CM

## 2020-07-13 PROCEDURE — G0439 PPPS, SUBSEQ VISIT: HCPCS | Performed by: INTERNAL MEDICINE

## 2020-07-13 PROCEDURE — 1170F FXNL STATUS ASSESSED: CPT | Performed by: INTERNAL MEDICINE

## 2020-07-13 PROCEDURE — 1125F AMNT PAIN NOTED PAIN PRSNT: CPT | Performed by: INTERNAL MEDICINE

## 2020-07-13 PROCEDURE — 3079F DIAST BP 80-89 MM HG: CPT | Performed by: INTERNAL MEDICINE

## 2020-07-13 PROCEDURE — 3077F SYST BP >= 140 MM HG: CPT | Performed by: INTERNAL MEDICINE

## 2020-07-13 PROCEDURE — 99214 OFFICE O/P EST MOD 30 MIN: CPT | Performed by: INTERNAL MEDICINE

## 2020-07-13 PROCEDURE — 1036F TOBACCO NON-USER: CPT | Performed by: INTERNAL MEDICINE

## 2020-07-13 PROCEDURE — 4040F PNEUMOC VAC/ADMIN/RCVD: CPT | Performed by: INTERNAL MEDICINE

## 2020-07-13 PROCEDURE — 1160F RVW MEDS BY RX/DR IN RCRD: CPT | Performed by: INTERNAL MEDICINE

## 2020-07-13 PROCEDURE — 1123F ACP DISCUSS/DSCN MKR DOCD: CPT | Performed by: INTERNAL MEDICINE

## 2020-07-13 PROCEDURE — 3008F BODY MASS INDEX DOCD: CPT | Performed by: INTERNAL MEDICINE

## 2020-07-13 PROCEDURE — 2022F DILAT RTA XM EVC RTNOPTHY: CPT | Performed by: INTERNAL MEDICINE

## 2020-07-13 RX ORDER — GLIMEPIRIDE 4 MG/1
2 TABLET ORAL
COMMUNITY
Start: 2020-06-09 | End: 2020-08-13 | Stop reason: SDUPTHER

## 2020-07-13 NOTE — ASSESSMENT & PLAN NOTE
Patient will be due for repeat Medicare wellness visit with his next appointment  Patient was given a slip full labs to have performed prior to the visit  Patient was told in the interim if there is any new problems or complaints to please call

## 2020-07-13 NOTE — ASSESSMENT & PLAN NOTE
With the patient his BMI shows him to be overweight  We again discussed with the patient the importance of diet, decreasing his caloric intake, making sure that he gets regular exercise in order to lose weight

## 2020-07-13 NOTE — ASSESSMENT & PLAN NOTE
Patient states he is extremely anxious today  Difficulties with his family  His wife was just in hospital and just released  Did find out that his grandson had acute coronary event, Deja  Is now finally returning home  States his rolled has been turned around recently  He continues present medication  If his blood pressure is still elevated with his next visit would consider modification of treatment  Otherwise patient to continue present medication, renal function is stable

## 2020-07-13 NOTE — ASSESSMENT & PLAN NOTE
Again patient has had some emotional upset recently  He states that he is anxious not only about his wife's the health is also his grandson  At this point patient will continue present medication citalopram at 10 mg a day

## 2020-07-13 NOTE — PROGRESS NOTES
Assessment/Plan:    Uncontrolled type 2 diabetes mellitus with hypoglycemia [facility])    Lab Results   Component Value Date    HGBA1C 6 9 (H) 05/26/2020    Patient did have labs performed prior to the visit today  Were happy to report to the patient that his sugar is showing adequate control with a hemoglobin A1c of 6 9  Patient admits that he is more conscientious about his diet  He is taking his medication as directed  Patient has not lost any weight since his last visit but we did discuss with him the importance of doing so  Patient will continue with present medication treatment  Diabetic foot exam was performed today and this was negative  Benign essential hypertension  Patient states he is extremely anxious today  Difficulties with his family  His wife was just in hospital and just released  Did find out that his grandson had acute coronary event, Deja  Is now finally returning home  States his rolled has been turned around recently  He continues present medication  If his blood pressure is still elevated with his next visit would consider modification of treatment  Otherwise patient to continue present medication, renal function is stable  Anxiety  Again patient has had some emotional upset recently  He states that he is anxious not only about his wife's the health is also his grandson  At this point patient will continue present medication citalopram at 10 mg a day  Hyperlipidemia  Patient admits that he has a stress eater  Has been trying to watch his diet and her reduce his intake of fats and cholesterol  He remains on Tricor and is unable to tolerate statins because of severe myalgias  We will continue to monitor his cholesterol level, make adjustment to medication if needed, consider addition of Zetia to his regime if his cholesterol is elevated with his next visit  Overweight  With the patient his BMI shows him to be overweight    We again discussed with the patient the importance of diet, decreasing his caloric intake, making sure that he gets regular exercise in order to lose weight  Healthcare maintenance  Patient will be due for repeat Medicare wellness visit with his next appointment  Patient was given a slip full labs to have performed prior to the visit  Patient was told in the interim if there is any new problems or complaints to please call  Diagnoses and all orders for this visit:    Uncontrolled type 2 diabetes mellitus with hypoglycemia without coma ([facility] [address] )    Benign essential hypertension    Healthcare maintenance    Mixed hyperlipidemia    Overweight    Prostate cancer screening  -     PSA, Total Screen; Future    Colon cancer screening  -     Occult Blood, Fecal Immunochemical; Future    Anxiety    Other orders  -     glimepiride (AMARYL) 4 mg tablet; Take 2 mg by mouth           Subjective:      Patient ID: Leslee Boswell  is a 72 y o  male  Patient is a 52-year-old male history of hypertension, hyperlipidemia, diabetes mellitus type 2, exogenous obesity, DJD, anxiety disorder  Patient is here today for Medicare wellness visit  He only had abbreviated labs performed prior to the visit today and these were done in April of this year  We did discuss the results  Patient states he has been under lot of emotional stress and strain recently with his wife recently hospitalized, his grandson also having an acute cardiac event, Darius-Parkinson-White syndrome with treatment at [address] recently      The following portions of the patient's history were reviewed and updated as appropriate: He  has a past medical history of DM (diabetes mellitus), type 2 ([address] )    He   Patient Active Problem List    Diagnosis Date Noted    Overweight 02/10/2020    Acute pain of right knee 10/08/2018    Healthcare maintenance 05/22/2018    Acute sinusitis 12/29/2017    Uncontrolled type 2 diabetes mellitus with hypoglycemia ([address] ) 08/04/2014    Anxiety 09/20/2012    Benign essential hypertension 09/20/2012    Hyperlipidemia 09/20/2012     He  has a past surgical history that includes Knee surgery (Right)  His family history includes Diabetes unspecified in his mother  He  reports that he has never smoked  He has never used smokeless tobacco  His alcohol and drug histories are not on file  Current Outpatient Medications   Medication Sig Dispense Refill    aspirin 81 mg chewable tablet Chew 1 tablet daily      bisoprolol-hydrochlorothiazide (ZIAC) 5-6 25 MG per tablet Take 1 tablet by mouth daily 90 tablet 3    citalopram (CeleXA) 10 mg tablet Take 1 tablet (10 mg total) by mouth daily 90 tablet 3    fenofibrate (TRICOR) 145 mg tablet Take 1 tablet (145 mg total) by mouth daily 90 tablet 3    glimepiride (AMARYL) 4 mg tablet Take 2 mg by mouth       glucose blood (ACCU-CHEK GUIDE) test strip Use to test blood sugars twice daily as instructed 100 each 1    Lancets MISC by Does not apply route 2 (two) times a day      losartan (COZAAR) 50 mg tablet TAKE 1 TABLET EVERY DAY 90 tablet 3    metFORMIN (GLUCOPHAGE-XR) 500 mg 24 hr tablet take 2 tablets by mouth twice a day 360 tablet 1    sitaGLIPtin (JANUVIA) 100 mg tablet Take 1 tablet (100 mg total) by mouth daily 30 tablet 4     No current facility-administered medications for this visit        Current Outpatient Medications on File Prior to Visit   Medication Sig    aspirin 81 mg chewable tablet Chew 1 tablet daily    bisoprolol-hydrochlorothiazide (ZIAC) 5-6 25 MG per tablet Take 1 tablet by mouth daily    citalopram (CeleXA) 10 mg tablet Take 1 tablet (10 mg total) by mouth daily    fenofibrate (TRICOR) 145 mg tablet Take 1 tablet (145 mg total) by mouth daily    glimepiride (AMARYL) 4 mg tablet Take 2 mg by mouth     glucose blood (ACCU-CHEK GUIDE) test strip Use to test blood sugars twice daily as instructed    Lancets MISC by Does not apply route 2 (two) times a day    losartan (COZAAR) 50 mg tablet TAKE 1 TABLET EVERY DAY    metFORMIN (GLUCOPHAGE-XR) 500 mg 24 hr tablet take 2 tablets by mouth twice a day    sitaGLIPtin (JANUVIA) 100 mg tablet Take 1 tablet (100 mg total) by mouth daily     No current facility-administered medications on file prior to visit  He has No Known Allergies       Review of Systems   Constitutional: Negative  HENT: Negative  Eyes: Negative  Respiratory: Negative  Cardiovascular: Negative  Endocrine: Negative  Genitourinary: Negative  Musculoskeletal: Positive for arthralgias  Negative for back pain, gait problem, joint swelling, myalgias, neck pain and neck stiffness  Skin: Negative  Allergic/Immunologic: Negative  Neurological: Negative  Hematological: Negative  Psychiatric/Behavioral: Negative for agitation, behavioral problems, confusion, decreased concentration, dysphoric mood, hallucinations, self-injury, sleep disturbance and suicidal ideas  The patient is nervous/anxious  The patient is not hyperactive  Objective:      /84   Pulse 64   Temp 97 7 °F (36 5 °C)   Ht 5' 11" (1 803 m)   Wt 94 8 kg (209 lb)   SpO2 98%   BMI 29 15 kg/m²          Physical Exam   Constitutional: He is oriented to person, place, and time  He appears well-developed and well-nourished  No distress  Overweight mildly anxious 64-year-old male who is awake alert no acute distress and oriented x3   HENT:   Head: Normocephalic and atraumatic  Right Ear: External ear normal    Left Ear: External ear normal    Nose: Nose normal    Mouth/Throat: Oropharynx is clear and moist  No oropharyngeal exudate  Eyes: Pupils are equal, round, and reactive to light  Conjunctivae and EOM are normal  Right eye exhibits no discharge  Left eye exhibits no discharge  No scleral icterus  Neck: Normal range of motion  Neck supple  No JVD present  No tracheal deviation present  No thyromegaly present     Cardiovascular: Normal rate, regular rhythm, normal heart sounds and intact distal pulses  Exam reveals no gallop and no friction rub  No murmur heard  Pulmonary/Chest: Effort normal and breath sounds normal  No stridor  No respiratory distress  He has no wheezes  He has no rales  He exhibits no tenderness  Abdominal: Soft  Bowel sounds are normal  He exhibits no distension and no mass  There is no tenderness  There is no rebound and no guarding  No hernia  Overweight   Genitourinary: Rectum normal, prostate normal and penis normal  Rectal exam shows guaiac negative stool  No penile tenderness  Musculoskeletal: Normal range of motion  He exhibits no edema, tenderness or deformity  Lymphadenopathy:     He has no cervical adenopathy  Neurological: He is alert and oriented to person, place, and time  He displays normal reflexes  No cranial nerve deficit or sensory deficit  He exhibits normal muscle tone  Coordination normal    Skin: Skin is warm and dry  Capillary refill takes less than 2 seconds  No rash noted  He is not diaphoretic  No erythema  No pallor  Psychiatric: His behavior is normal  Judgment and thought content normal    Moderately anxious mood and affect today   Nursing note and vitals reviewed

## 2020-07-13 NOTE — PROGRESS NOTES
Assessment and Plan:     Problem List Items Addressed This Visit     None           Preventive health issues were discussed with patient, and age appropriate screening tests were ordered as noted in patient's After Visit Summary  Personalized health advice and appropriate referrals for health education or preventive services given if needed, as noted in patient's After Visit Summary       History of Present Illness:     Patient presents for Medicare Annual Wellness visit    Patient Care Team:  Feng Zheng DO as PCP - Colonel Lars MD as PCP - Endocrinology (Endocrinology)  Sandy Boothe MD     Problem List:     Patient Active Problem List   Diagnosis    Anxiety    Benign essential hypertension    Uncontrolled type 2 diabetes mellitus with hypoglycemia ([facility] [address] )    Hyperlipidemia    Healthcare maintenance    Acute pain of right knee    Acute sinusitis    Overweight      Past Medical and Surgical History:     Past Medical History:   Diagnosis Date    DM (diabetes mellitus), type 2 ([facility] [address] )      Past Surgical History:   Procedure Laterality Date    KNEE SURGERY Right       Family History:     Family History   Problem Relation Age of Onset    Diabetes unspecified Mother       Social History:        Social History     Socioeconomic History    Marital status: /Civil Union     Spouse name: None    Number of children: None    Years of education: None    Highest education level: None   Occupational History    None   Social Needs    Financial resource strain: None    Food insecurity:     Worry: None     Inability: None    Transportation needs:     Medical: None     Non-medical: None   Tobacco Use    Smoking status: Never Smoker    Smokeless tobacco: Never Used   Substance and Sexual Activity    Alcohol use: None    Drug use: None    Sexual activity: None   Lifestyle    Physical activity:     Days per week: None     Minutes per session: None    Stress: None   Relationships    Social connections:     Talks on phone: None     Gets together: None     Attends Oriental orthodox service: None     Active member of club or organization: None     Attends meetings of clubs or organizations: None     Relationship status: None    Intimate partner violence:     Fear of current or ex partner: None     Emotionally abused: None     Physically abused: None     Forced sexual activity: None   Other Topics Concern    None   Social History Narrative    None      Medications and Allergies:     Current Outpatient Medications   Medication Sig Dispense Refill    aspirin 81 mg chewable tablet Chew 1 tablet daily      bisoprolol-hydrochlorothiazide (ZIAC) 5-6 25 MG per tablet Take 1 tablet by mouth daily 90 tablet 3    citalopram (CeleXA) 10 mg tablet Take 1 tablet (10 mg total) by mouth daily 90 tablet 3    fenofibrate (TRICOR) 145 mg tablet Take 1 tablet (145 mg total) by mouth daily 90 tablet 3    glimepiride (AMARYL) 4 mg tablet Take 2 mg by mouth       glucose blood (ACCU-CHEK GUIDE) test strip Use to test blood sugars twice daily as instructed 100 each 1    Lancets MISC by Does not apply route 2 (two) times a day      losartan (COZAAR) 50 mg tablet TAKE 1 TABLET EVERY DAY 90 tablet 3    metFORMIN (GLUCOPHAGE-XR) 500 mg 24 hr tablet take 2 tablets by mouth twice a day 360 tablet 1    sitaGLIPtin (JANUVIA) 100 mg tablet Take 1 tablet (100 mg total) by mouth daily 30 tablet 4     No current facility-administered medications for this visit        No Known Allergies   Immunizations:     Immunization History   Administered Date(s) Administered    Influenza Quadrivalent Preservative Free 3 years and older IM 09/29/2014    Influenza Quadrivalent, 6-35 Months IM 09/18/2015    Influenza Split High Dose Preservative Free IM 10/19/2016, 09/15/2017    Influenza TIV (IM) 09/20/2012, 09/24/2013    Influenza, high dose seasonal 0 5 mL 10/07/2019    Influenza, injectable, quadrivalent, preservative free 0 5 mL 09/29/2018    Pneumococcal Conjugate 13-Valent 10/19/2016      Health Maintenance:         Topic Date Due    Hepatitis C Screening  1950    CRC Screening: Colonoscopy  1950         Topic Date Due    DTaP,Tdap,and Td Vaccines (1 - Tdap) 06/17/1962    Pneumococcal Vaccine: 65+ Years (2 of 2 - PPSV23) 10/19/2017    Influenza Vaccine  07/01/2020      Medicare Health Risk Assessment:     /84   Pulse 64   Temp 97 7 °F (36 5 °C)   Ht 5' 11" (1 803 m)   Wt 94 8 kg (209 lb)   SpO2 98%   BMI 29 15 kg/m²      Meghann Villalobos is here for his Subsequent Wellness visit  Health Risk Assessment:   Patient rates overall health as good  Patient feels that their physical health rating is slightly better  Eyesight was rated as same  Hearing was rated as slightly worse  Patient feels that their emotional and mental health rating is slightly worse  Pain experienced in the last 7 days has been some  Patient's pain rating has been 7/10  Patient states that he has experienced no weight loss or gain in last 6 months  Depression Screening:   PHQ-2 Score: 0      Fall Risk Screening: In the past year, patient has experienced: no history of falling in past year      Home Safety:  Patient does not have trouble with stairs inside or outside of their home  Patient has working smoke alarms and has no working carbon monoxide detector  Home safety hazards include: none  Nutrition:   Current diet is Regular  Medications:   Patient is currently taking over-the-counter supplements  OTC medications include: see medication list  Patient is able to manage medications  Activities of Daily Living (ADLs)/Instrumental Activities of Daily Living (IADLs):   Walk and transfer into and out of bed and chair?: Yes  Dress and groom yourself?: Yes    Bathe or shower yourself?: Yes    Feed yourself?  Yes  Do your laundry/housekeeping?: Yes  Manage your money, pay your bills and track your expenses?: Yes  Make your own meals?: Yes    Do your own shopping?: Yes    Previous Hospitalizations:   Any hospitalizations or ED visits within the last 12 months?: No      Advance Care Planning:   Living will: Yes    Durable POA for healthcare:  Yes    Advanced directive: Yes      PREVENTIVE SCREENINGS      Cardiovascular Screening:    General: Screening Not Indicated and History Lipid Disorder      Diabetes Screening:     General: Screening Not Indicated and History Diabetes      Colorectal Cancer Screening:     General: Screening Current      Prostate Cancer Screening:    General: Screening Current      Abdominal Aortic Aneurysm (AAA) Screening:    Risk factors include: age between 73-69 yo        Lung Cancer Screening:     General: Screening Not Indicated      Monroe Baum DO

## 2020-07-13 NOTE — ASSESSMENT & PLAN NOTE
Patient admits that he has a stress eater  Has been trying to watch his diet and her reduce his intake of fats and cholesterol  He remains on Tricor and is unable to tolerate statins because of severe myalgias  We will continue to monitor his cholesterol level, make adjustment to medication if needed, consider addition of Zetia to his regime if his cholesterol is elevated with his next visit

## 2020-07-15 ENCOUNTER — OFFICE VISIT (OUTPATIENT)
Dept: ENDOCRINOLOGY | Facility: CLINIC | Age: 70
End: 2020-07-15
Payer: MEDICARE

## 2020-07-15 VITALS
WEIGHT: 209 LBS | TEMPERATURE: 97.8 F | HEART RATE: 68 BPM | HEIGHT: 71 IN | SYSTOLIC BLOOD PRESSURE: 120 MMHG | BODY MASS INDEX: 29.26 KG/M2 | DIASTOLIC BLOOD PRESSURE: 90 MMHG

## 2020-07-15 DIAGNOSIS — I10 BENIGN ESSENTIAL HYPERTENSION: ICD-10-CM

## 2020-07-15 DIAGNOSIS — E78.2 MIXED HYPERLIPIDEMIA: ICD-10-CM

## 2020-07-15 DIAGNOSIS — E11.65 TYPE 2 DIABETES MELLITUS WITH HYPERGLYCEMIA, WITHOUT LONG-TERM CURRENT USE OF INSULIN (HCC): Primary | ICD-10-CM

## 2020-07-15 PROCEDURE — 1170F FXNL STATUS ASSESSED: CPT | Performed by: PHYSICIAN ASSISTANT

## 2020-07-15 PROCEDURE — 3080F DIAST BP >= 90 MM HG: CPT | Performed by: PHYSICIAN ASSISTANT

## 2020-07-15 PROCEDURE — 3074F SYST BP LT 130 MM HG: CPT | Performed by: PHYSICIAN ASSISTANT

## 2020-07-15 PROCEDURE — 99214 OFFICE O/P EST MOD 30 MIN: CPT | Performed by: PHYSICIAN ASSISTANT

## 2020-07-15 PROCEDURE — 1036F TOBACCO NON-USER: CPT | Performed by: PHYSICIAN ASSISTANT

## 2020-07-15 PROCEDURE — 1160F RVW MEDS BY RX/DR IN RCRD: CPT | Performed by: PHYSICIAN ASSISTANT

## 2020-07-15 PROCEDURE — 3008F BODY MASS INDEX DOCD: CPT | Performed by: PHYSICIAN ASSISTANT

## 2020-07-15 PROCEDURE — 4040F PNEUMOC VAC/ADMIN/RCVD: CPT | Performed by: PHYSICIAN ASSISTANT

## 2020-07-15 PROCEDURE — 2022F DILAT RTA XM EVC RTNOPTHY: CPT | Performed by: PHYSICIAN ASSISTANT

## 2020-07-15 NOTE — PATIENT INSTRUCTIONS
Hypoglycemia instructions   Harris Pierce   7/15/2020  [id number]    Low Blood Sugar    Steps to treat low blood sugar  1  Test blood sugar if you have symptoms of low blood sugar:   Low Blood Sugar Symptoms:  o Sweaty  o Dizzy  o Rapid heartbeat  o Shaky  o Bad mood  o Hungry      2  Treat blood sugar less than 70 with 15 grams of fast-acting carbohydrate:   Examples of 15 grams Fast-Acting Carbohydrate:  o 4 oz juice  o 4 oz regular soda  o 3-4 glucose tablets (chew)  o 3-4 hard candies (chew)          3  Wait 15 minutes and test your blood sugar again     4   If blood sugar is less than 100, repeat steps 2-3     5  When your blood sugar is 100 or more, eat a snack if it will be longer than one hour until your next meal  The snack should be 15 grams of carbohydrate and a protein:   Examples of snacks:  o ½ sandwich  o 6 crackers with cheese  o Piece of fruit with cheese or peanut butter  o 6 crackers with peanut butter

## 2020-07-16 ENCOUNTER — TELEPHONE (OUTPATIENT)
Dept: ENDOCRINOLOGY | Facility: CLINIC | Age: 70
End: 2020-07-16

## 2020-08-13 DIAGNOSIS — E11.649 UNCONTROLLED TYPE 2 DIABETES MELLITUS WITH HYPOGLYCEMIA WITHOUT COMA (HCC): Primary | ICD-10-CM

## 2020-08-13 RX ORDER — GLIMEPIRIDE 4 MG/1
2 TABLET ORAL
Qty: 90 TABLET | Refills: 3 | Status: SHIPPED | OUTPATIENT
Start: 2020-08-13 | End: 2021-04-01

## 2020-10-14 LAB
25(OH)D3 SERPL-MCNC: 31 NG/ML (ref 30–100)
ALBUMIN/CREAT UR: 3 MCG/MG CREAT
BUN SERPL-MCNC: 14 MG/DL (ref 7–25)
BUN/CREAT SERPL: NORMAL (CALC) (ref 6–22)
CALCIUM SERPL-MCNC: 9.4 MG/DL (ref 8.6–10.3)
CHLORIDE SERPL-SCNC: 103 MMOL/L (ref 98–110)
CO2 SERPL-SCNC: 28 MMOL/L (ref 20–32)
CREAT SERPL-MCNC: 0.91 MG/DL (ref 0.7–1.25)
CREAT UR-MCNC: 124 MG/DL (ref 20–320)
GLUCOSE SERPL-MCNC: 94 MG/DL (ref 65–99)
HBA1C MFR BLD: 6.4 % OF TOTAL HGB
MICROALBUMIN UR-MCNC: 0.4 MG/DL
POTASSIUM SERPL-SCNC: 4.3 MMOL/L (ref 3.5–5.3)
SL AMB EGFR AFRICAN AMERICAN: 99 ML/MIN/1.73M2
SL AMB EGFR NON AFRICAN AMERICAN: 86 ML/MIN/1.73M2
SODIUM SERPL-SCNC: 137 MMOL/L (ref 135–146)

## 2020-10-16 ENCOUNTER — IMMUNIZATIONS (OUTPATIENT)
Dept: INTERNAL MEDICINE CLINIC | Facility: CLINIC | Age: 70
End: 2020-10-16
Payer: MEDICARE

## 2020-10-16 DIAGNOSIS — Z23 ENCOUNTER FOR IMMUNIZATION: Primary | ICD-10-CM

## 2020-10-21 ENCOUNTER — OFFICE VISIT (OUTPATIENT)
Dept: ENDOCRINOLOGY | Facility: CLINIC | Age: 70
End: 2020-10-21
Payer: MEDICARE

## 2020-10-21 VITALS
TEMPERATURE: 98.7 F | HEIGHT: 71 IN | WEIGHT: 210 LBS | DIASTOLIC BLOOD PRESSURE: 76 MMHG | HEART RATE: 64 BPM | BODY MASS INDEX: 29.4 KG/M2 | SYSTOLIC BLOOD PRESSURE: 140 MMHG

## 2020-10-21 DIAGNOSIS — E11.65 TYPE 2 DIABETES MELLITUS WITH HYPERGLYCEMIA, WITHOUT LONG-TERM CURRENT USE OF INSULIN (HCC): Primary | ICD-10-CM

## 2020-10-21 DIAGNOSIS — E78.2 MIXED HYPERLIPIDEMIA: ICD-10-CM

## 2020-10-21 DIAGNOSIS — E55.9 VITAMIN D DEFICIENCY: ICD-10-CM

## 2020-10-21 DIAGNOSIS — I10 BENIGN ESSENTIAL HYPERTENSION: ICD-10-CM

## 2020-10-21 PROCEDURE — 99214 OFFICE O/P EST MOD 30 MIN: CPT | Performed by: PHYSICIAN ASSISTANT

## 2020-10-21 RX ORDER — MELATONIN
1000 DAILY
Start: 2020-10-21

## 2020-10-26 LAB
LEFT EYE DIABETIC RETINOPATHY: NORMAL
RIGHT EYE DIABETIC RETINOPATHY: NORMAL

## 2020-10-30 ENCOUNTER — TELEPHONE (OUTPATIENT)
Dept: ENDOCRINOLOGY | Facility: CLINIC | Age: 70
End: 2020-10-30

## 2020-11-13 ENCOUNTER — OFFICE VISIT (OUTPATIENT)
Dept: INTERNAL MEDICINE CLINIC | Facility: CLINIC | Age: 70
End: 2020-11-13
Payer: MEDICARE

## 2020-11-13 VITALS
SYSTOLIC BLOOD PRESSURE: 138 MMHG | BODY MASS INDEX: 29.4 KG/M2 | TEMPERATURE: 97.4 F | OXYGEN SATURATION: 95 % | WEIGHT: 210 LBS | HEART RATE: 71 BPM | HEIGHT: 71 IN | DIASTOLIC BLOOD PRESSURE: 80 MMHG

## 2020-11-13 DIAGNOSIS — E66.3 OVERWEIGHT: ICD-10-CM

## 2020-11-13 DIAGNOSIS — E78.2 MIXED HYPERLIPIDEMIA: ICD-10-CM

## 2020-11-13 DIAGNOSIS — I10 BENIGN ESSENTIAL HYPERTENSION: ICD-10-CM

## 2020-11-13 DIAGNOSIS — G20 PARKINSON'S DISEASE (HCC): Primary | ICD-10-CM

## 2020-11-13 DIAGNOSIS — E55.9 VITAMIN D DEFICIENCY: ICD-10-CM

## 2020-11-13 PROBLEM — G20.A1 PARKINSON'S DISEASE: Status: ACTIVE | Noted: 2020-11-13

## 2020-11-13 PROCEDURE — 99214 OFFICE O/P EST MOD 30 MIN: CPT | Performed by: INTERNAL MEDICINE

## 2020-11-13 PROCEDURE — G0008 ADMIN INFLUENZA VIRUS VAC: HCPCS

## 2020-11-13 PROCEDURE — 90662 IIV NO PRSV INCREASED AG IM: CPT

## 2020-12-02 ENCOUNTER — OFFICE VISIT (OUTPATIENT)
Dept: INTERNAL MEDICINE CLINIC | Facility: CLINIC | Age: 70
End: 2020-12-02
Payer: MEDICARE

## 2020-12-02 VITALS
TEMPERATURE: 98.5 F | WEIGHT: 215 LBS | DIASTOLIC BLOOD PRESSURE: 76 MMHG | HEART RATE: 70 BPM | BODY MASS INDEX: 30.1 KG/M2 | OXYGEN SATURATION: 95 % | HEIGHT: 71 IN | SYSTOLIC BLOOD PRESSURE: 140 MMHG

## 2020-12-02 DIAGNOSIS — E11.9 TYPE 2 DIABETES MELLITUS WITHOUT COMPLICATION, WITHOUT LONG-TERM CURRENT USE OF INSULIN (HCC): ICD-10-CM

## 2020-12-02 DIAGNOSIS — I10 BENIGN ESSENTIAL HYPERTENSION: ICD-10-CM

## 2020-12-02 DIAGNOSIS — E78.2 MIXED HYPERLIPIDEMIA: ICD-10-CM

## 2020-12-02 DIAGNOSIS — G20 PARKINSON'S DISEASE (HCC): Primary | ICD-10-CM

## 2020-12-02 PROCEDURE — 99214 OFFICE O/P EST MOD 30 MIN: CPT | Performed by: INTERNAL MEDICINE

## 2021-02-18 ENCOUNTER — TELEPHONE (OUTPATIENT)
Dept: ENDOCRINOLOGY | Facility: CLINIC | Age: 71
End: 2021-02-18

## 2021-02-20 LAB
BUN SERPL-MCNC: 18 MG/DL (ref 7–25)
BUN/CREAT SERPL: ABNORMAL (CALC) (ref 6–22)
CALCIUM SERPL-MCNC: 9.5 MG/DL (ref 8.6–10.3)
CHLORIDE SERPL-SCNC: 103 MMOL/L (ref 98–110)
CO2 SERPL-SCNC: 28 MMOL/L (ref 20–32)
CREAT SERPL-MCNC: 1 MG/DL (ref 0.7–1.25)
GLUCOSE SERPL-MCNC: 120 MG/DL (ref 65–99)
HBA1C MFR BLD: 7 % OF TOTAL HGB
POTASSIUM SERPL-SCNC: 4.3 MMOL/L (ref 3.5–5.3)
SL AMB EGFR AFRICAN AMERICAN: 89 ML/MIN/1.73M2
SL AMB EGFR NON AFRICAN AMERICAN: 76 ML/MIN/1.73M2
SODIUM SERPL-SCNC: 139 MMOL/L (ref 135–146)

## 2021-02-24 ENCOUNTER — OFFICE VISIT (OUTPATIENT)
Dept: ENDOCRINOLOGY | Facility: CLINIC | Age: 71
End: 2021-02-24
Payer: MEDICARE

## 2021-02-24 VITALS
HEART RATE: 58 BPM | BODY MASS INDEX: 29.57 KG/M2 | SYSTOLIC BLOOD PRESSURE: 136 MMHG | DIASTOLIC BLOOD PRESSURE: 72 MMHG | WEIGHT: 212 LBS | TEMPERATURE: 97.3 F

## 2021-02-24 DIAGNOSIS — E11.65 TYPE 2 DIABETES MELLITUS WITH HYPERGLYCEMIA, WITHOUT LONG-TERM CURRENT USE OF INSULIN (HCC): Primary | ICD-10-CM

## 2021-02-24 DIAGNOSIS — I10 BENIGN ESSENTIAL HYPERTENSION: ICD-10-CM

## 2021-02-24 DIAGNOSIS — E55.9 VITAMIN D DEFICIENCY: ICD-10-CM

## 2021-02-24 DIAGNOSIS — E78.2 MIXED HYPERLIPIDEMIA: ICD-10-CM

## 2021-02-24 PROCEDURE — 99214 OFFICE O/P EST MOD 30 MIN: CPT | Performed by: PHYSICIAN ASSISTANT

## 2021-02-24 NOTE — PROGRESS NOTES
Patient Progress Note      CC: DM      Referring Provider  Regina Burnett, Do  [address]  [address] [place] [address],  [address]     History of Present Illness:   Adelita Ryan  is a 72 y o  male with a history of type 2 diabetes without long term use of insulin  Diabetes course has been stable  Complications of DM: None reported  Denies recent illness or hospitalizations  Denies recent severe hypoglycemic or severe hyperglycemic episodes  Denies any issues with his current regimen  Home glucose monitoring: are performed sporadically     Home blood glucose readings: 106-135 mg/dl     Current regimen: Januvia 100 mg daily, metformin 1000 mg twice a day, glimepiride 2 mg with breakfast  compliant most of the time, denies any side effects from medications  Hypoglycemic episodes: No, rare  H/o of hypoglycemia causing hospitalization or Intervention such as glucagon injection or ambulance call : No  Hypoglycemia symptoms: dizziness and sweating and jittery  Treatment of hypoglycemia: candy     Diet: 3 meals per day, 0-1 snacks per day  Timing of meals is predictable  Diabetic diet compliance: noncompliant some of the time  Activity: Daily activity is predictable: Yes  He is active around the house  Ophthamology: eye exam, October 2020   Podiatry: foot exam UTD, Nov 2020      Has hypertension: on ACE inhibitor/ARB, compliant most of the time  Has hyperlipidemia: on fenofibrate- tolerating well, no myalgias  compliant most of the time, denies any side effects from medications  Thyroid disorders: No  History of pancreatitis: No     Vitamin D previously was low normal at 31  He is taking vitamin D3 1000 IU daily      Patient Active Problem List   Diagnosis    Anxiety    Benign essential hypertension    Type 2 diabetes mellitus with hyperglycemia, without long-term current use of insulin (HCC)    Hyperlipidemia    Healthcare maintenance    Acute pain of right knee    Acute sinusitis    Overweight  Vitamin D deficiency    Parkinson's disease ([facility] )      Past Medical History:   Diagnosis Date    DM (diabetes mellitus), type 2 ([facility] )       Past Surgical History:   Procedure Laterality Date    KNEE SURGERY Right       Family History   Problem Relation Age of Onset    Diabetes unspecified Mother      Social History     Tobacco Use    Smoking status: Never Smoker    Smokeless tobacco: Never Used   Substance Use Topics    Alcohol use:  Yes     Alcohol/week: 3 0 standard drinks     Types: 3 Cans of beer per week     Frequency: 2-3 times a week     No Known Allergies      Current Outpatient Medications:     aspirin 81 mg chewable tablet, Chew 1 tablet daily, Disp: , Rfl:     bisoprolol-hydrochlorothiazide (ZIAC) 5-6 25 MG per tablet, Take 1 tablet by mouth daily, Disp: 90 tablet, Rfl: 3    carbidopa-levodopa (SINEMET)  mg per tablet, Take 1 tablet by mouth 2 (two) times a day, Disp: 60 tablet, Rfl: 3    cholecalciferol (VITAMIN D3) 1,000 units tablet, Take 1 tablet (1,000 Units total) by mouth daily, Disp:  , Rfl:     citalopram (CeleXA) 10 mg tablet, Take 1 tablet (10 mg total) by mouth daily, Disp: 90 tablet, Rfl: 3    fenofibrate (TRICOR) 145 mg tablet, Take 1 tablet (145 mg total) by mouth daily, Disp: 90 tablet, Rfl: 3    glimepiride (AMARYL) 4 mg tablet, Take 0 5 tablets (2 mg total) by mouth daily with breakfast, Disp: 90 tablet, Rfl: 3    glucose blood (ACCU-CHEK GUIDE) test strip, Use to test blood sugars twice daily as instructed, Disp: 100 each, Rfl: 1    Lancets MISC, by Does not apply route 2 (two) times a day, Disp: , Rfl:     losartan (COZAAR) 50 mg tablet, TAKE 1 TABLET EVERY DAY, Disp: 90 tablet, Rfl: 3    metFORMIN (GLUCOPHAGE-XR) 500 mg 24 hr tablet, take 2 tablets by mouth twice a day, Disp: 360 tablet, Rfl: 1    sitaGLIPtin (JANUVIA) 100 mg tablet, Take 1 tablet (100 mg total) by mouth daily, Disp: 30 tablet, Rfl: 4  Review of Systems   Constitutional: Negative for activity change, appetite change, fatigue and unexpected weight change  HENT: Negative for trouble swallowing  Eyes: Negative for visual disturbance  Respiratory: Negative for shortness of breath  Cardiovascular: Negative for chest pain and palpitations  Gastrointestinal: Negative for constipation and diarrhea  Endocrine: Negative for polydipsia and polyuria  Musculoskeletal: Negative  Skin: Negative  Neurological: Negative for numbness  Psychiatric/Behavioral: Negative  Physical Exam:  Body mass index is 29 57 kg/m²  /72   Pulse 58   Temp (!) 97 3 °F (36 3 °C)   Wt 96 2 kg (212 lb)   BMI 29 57 kg/m²    Wt Readings from Last 3 Encounters:   02/24/21 96 2 kg (212 lb)   12/02/20 97 5 kg (215 lb)   11/13/20 95 3 kg (210 lb)       Physical Exam  Vitals signs and nursing note reviewed  Constitutional:       Appearance: He is well-developed  HENT:      Head: Normocephalic  Eyes:      General: No scleral icterus  Pupils: Pupils are equal, round, and reactive to light  Neck:      Musculoskeletal: Neck supple  Thyroid: No thyromegaly  Cardiovascular:      Rate and Rhythm: Normal rate and regular rhythm  Pulses:           Radial pulses are 2+ on the right side and 2+ on the left side  Heart sounds: No murmur  Pulmonary:      Effort: Pulmonary effort is normal  No respiratory distress  Breath sounds: Normal breath sounds  No wheezing  Skin:     General: Skin is warm and dry  Neurological:      Mental Status: He is alert  Patient's shoes and socks were not removed        Labs:   Component      Latest Ref Rng & Units 10/13/2020 2/19/2021   Glucose, Random      65 - 99 mg/dL 94 120 (H)   BUN      7 - 25 mg/dL 14 18   Creatinine      0 70 - 1 25 mg/dL 0 91 1 00   eGFR Non       > OR = 60 mL/min/1 73m2 86 76   eGFR       > OR = 60 mL/min/1 73m2 99 89   SL AMB BUN/CREATININE RATIO      6 - 22 (calc) NOT APPLICABLE NOT APPLICABLE   Sodium      135 - 146 mmol/L 137 139   Potassium      3 5 - 5 3 mmol/L 4 3 4 3   Chloride      98 - 110 mmol/L 103 103   CO2      20 - 32 mmol/L 28 28   Calcium      8 6 - 10 3 mg/dL 9 4 9 5   EXT Creatinine Urine      20 - 320 mg/dL 124    MICROALBUM ,U,RANDOM      See Note: mg/dL 0 4    MICROALBUMIN/CREATININE RATIO      <30 mcg/mg creat 3    EXTERNAL VITAMIN D,25      30 - 100 ng/mL 31    Hemoglobin A1C      <5 7 % of total Hgb 6 4 (H) 7 0 (H)         Plan:    Diagnoses and all orders for this visit:    Type 2 diabetes mellitus with hyperglycemia, without long-term current use of insulin (HCC)  HGA1C 7 0%  Worsened  Treatment regimen: continue current treatment  Discussed risks/complications associated with uncontrolled diabetes  Advised to adhere to diabetic diet, and recommended staying active/exercising routinely as toelrated  Keep carbohydrates consistent to limit blood glucose fluctuations  Advised to call if blood sugars less than 70 mg/dl or over 300 mg/dl  Check blood glucose 1 time a day  Discussed symptoms and treatment of hypoglycemia  Recommended routine follow-up with podiatry and ophthalmology  Ordered blood work to complete prior to next visit  -     Hemoglobin A1C; Future  -     Basic metabolic panel; Future    Benign essential hypertension  Blood pressure well controlled, continue current treatment  -     Basic metabolic panel; Future    Mixed hyperlipidemia  LDL previously 112, triglycerides 158  Continue fenofibrate  Managed by PCP    Vitamin D deficiency  Vitamin D previously 31  Continue current supplementation of vitamin D3      Discussed with the patient diagnosis and treatment and all questions fully answered  He will call me if any problems arise  Counseled patient on diagnostic results, prognosis, risk and benefit of treatment options, instruction for management, importance of treatment compliance, risk factor reduction and impressions        Angy Oliveira, ANDREIA

## 2021-02-24 NOTE — PATIENT INSTRUCTIONS
Hypoglycemia instructions   Ondina Pacheco   2/24/2021  [id number]    Low Blood Sugar    Steps to treat low blood sugar  1  Test blood sugar if you have symptoms of low blood sugar:   Low Blood Sugar Symptoms:  o Sweaty  o Dizzy  o Rapid heartbeat  o Shaky  o Bad mood  o Hungry      2  Treat blood sugar less than 70 with 15 grams of fast-acting carbohydrate:   Examples of 15 grams Fast-Acting Carbohydrate:  o 4 oz juice  o 4 oz regular soda  o 3-4 glucose tablets (chew)  o 3-4 hard candies (chew)          3  Wait 15 minutes and test your blood sugar again     4   If blood sugar is less than 100, repeat steps 2-3     5  When your blood sugar is 100 or more, eat a snack if it will be longer than one hour until your next meal  The snack should be 15 grams of carbohydrate and a protein:   Examples of snacks:  o ½ sandwich  o 6 crackers with cheese  o Piece of fruit with cheese or peanut butter  o 6 crackers with peanut butter

## 2021-03-03 DIAGNOSIS — I10 ESSENTIAL HYPERTENSION: ICD-10-CM

## 2021-03-03 DIAGNOSIS — E11.9 TYPE 2 DIABETES MELLITUS WITHOUT COMPLICATION, WITHOUT LONG-TERM CURRENT USE OF INSULIN (HCC): ICD-10-CM

## 2021-03-03 RX ORDER — FENOFIBRATE 145 MG/1
145 TABLET, COATED ORAL DAILY
Qty: 90 TABLET | Refills: 3 | Status: SHIPPED | OUTPATIENT
Start: 2021-03-03 | End: 2022-02-09

## 2021-03-03 RX ORDER — LOSARTAN POTASSIUM 50 MG/1
TABLET ORAL
Qty: 90 TABLET | Refills: 3 | Status: SHIPPED | OUTPATIENT
Start: 2021-03-03 | End: 2021-03-15 | Stop reason: SDUPTHER

## 2021-03-15 DIAGNOSIS — I10 ESSENTIAL HYPERTENSION: ICD-10-CM

## 2021-03-15 RX ORDER — LOSARTAN POTASSIUM 50 MG/1
50 TABLET ORAL DAILY
Qty: 90 TABLET | Refills: 3 | Status: SHIPPED | OUTPATIENT
Start: 2021-03-15 | End: 2022-03-28 | Stop reason: SDUPTHER

## 2021-03-24 ENCOUNTER — TELEPHONE (OUTPATIENT)
Dept: ENDOCRINOLOGY | Facility: CLINIC | Age: 71
End: 2021-03-24

## 2021-03-24 ENCOUNTER — APPOINTMENT (OUTPATIENT)
Dept: LAB | Facility: HOSPITAL | Age: 71
End: 2021-03-24
Payer: MEDICARE

## 2021-03-24 DIAGNOSIS — E11.65 TYPE 2 DIABETES MELLITUS WITH HYPERGLYCEMIA, WITHOUT LONG-TERM CURRENT USE OF INSULIN (HCC): ICD-10-CM

## 2021-03-24 DIAGNOSIS — I10 BENIGN ESSENTIAL HYPERTENSION: ICD-10-CM

## 2021-03-24 DIAGNOSIS — E11.9 TYPE 2 DIABETES MELLITUS WITHOUT COMPLICATION, WITHOUT LONG-TERM CURRENT USE OF INSULIN (HCC): ICD-10-CM

## 2021-03-24 LAB
ANION GAP SERPL CALCULATED.3IONS-SCNC: 3 MMOL/L (ref 4–13)
BUN SERPL-MCNC: 23 MG/DL (ref 5–25)
CALCIUM SERPL-MCNC: 9.4 MG/DL (ref 8.3–10.1)
CHLORIDE SERPL-SCNC: 108 MMOL/L (ref 100–108)
CO2 SERPL-SCNC: 27 MMOL/L (ref 21–32)
CREAT SERPL-MCNC: 0.97 MG/DL (ref 0.6–1.3)
EST. AVERAGE GLUCOSE BLD GHB EST-MCNC: 143 MG/DL
GFR SERPL CREATININE-BSD FRML MDRD: 79 ML/MIN/1.73SQ M
GLUCOSE P FAST SERPL-MCNC: 117 MG/DL (ref 65–99)
HBA1C MFR BLD: 6.6 %
POTASSIUM SERPL-SCNC: 4.6 MMOL/L (ref 3.5–5.3)
SODIUM SERPL-SCNC: 138 MMOL/L (ref 136–145)

## 2021-03-24 PROCEDURE — 83036 HEMOGLOBIN GLYCOSYLATED A1C: CPT

## 2021-03-24 PROCEDURE — 80048 BASIC METABOLIC PNL TOTAL CA: CPT

## 2021-03-24 PROCEDURE — 36415 COLL VENOUS BLD VENIPUNCTURE: CPT

## 2021-03-24 NOTE — TELEPHONE ENCOUNTER
----- Message from Anjelica Michael PA-C sent at 3/24/2021 12:46 PM EDT -----  Please call the patient regarding his abnormal result    A1C improved to 6 6% which is great  Glucose is 117  Kidney function is in acceptable range  Continue current treatment

## 2021-04-01 DIAGNOSIS — E11.649 UNCONTROLLED TYPE 2 DIABETES MELLITUS WITH HYPOGLYCEMIA WITHOUT COMA (HCC): ICD-10-CM

## 2021-04-01 RX ORDER — GLIMEPIRIDE 4 MG/1
TABLET ORAL
Qty: 90 TABLET | Refills: 3 | Status: SHIPPED | OUTPATIENT
Start: 2021-04-01

## 2021-04-02 ENCOUNTER — OFFICE VISIT (OUTPATIENT)
Dept: INTERNAL MEDICINE CLINIC | Facility: CLINIC | Age: 71
End: 2021-04-02
Payer: MEDICARE

## 2021-04-02 VITALS
OXYGEN SATURATION: 97 % | SYSTOLIC BLOOD PRESSURE: 142 MMHG | WEIGHT: 211 LBS | BODY MASS INDEX: 29.54 KG/M2 | DIASTOLIC BLOOD PRESSURE: 86 MMHG | HEART RATE: 82 BPM | HEIGHT: 71 IN | TEMPERATURE: 97.5 F

## 2021-04-02 DIAGNOSIS — Z00.00 HEALTHCARE MAINTENANCE: ICD-10-CM

## 2021-04-02 DIAGNOSIS — I10 BENIGN ESSENTIAL HYPERTENSION: ICD-10-CM

## 2021-04-02 DIAGNOSIS — F41.9 ANXIETY: ICD-10-CM

## 2021-04-02 DIAGNOSIS — E11.65 TYPE 2 DIABETES MELLITUS WITH HYPERGLYCEMIA, WITHOUT LONG-TERM CURRENT USE OF INSULIN (HCC): ICD-10-CM

## 2021-04-02 DIAGNOSIS — E55.9 VITAMIN D DEFICIENCY: ICD-10-CM

## 2021-04-02 DIAGNOSIS — E66.3 OVERWEIGHT: ICD-10-CM

## 2021-04-02 DIAGNOSIS — G20 PARKINSON'S DISEASE (HCC): ICD-10-CM

## 2021-04-02 DIAGNOSIS — E78.2 MIXED HYPERLIPIDEMIA: ICD-10-CM

## 2021-04-02 DIAGNOSIS — M25.561 ACUTE PAIN OF RIGHT KNEE: Primary | ICD-10-CM

## 2021-04-02 PROCEDURE — 99214 OFFICE O/P EST MOD 30 MIN: CPT | Performed by: INTERNAL MEDICINE

## 2021-04-02 RX ORDER — CITALOPRAM 10 MG/1
10 TABLET ORAL DAILY
Qty: 90 TABLET | Refills: 3 | Status: SHIPPED | OUTPATIENT
Start: 2021-04-02 | End: 2021-12-30 | Stop reason: SDUPTHER

## 2021-04-02 NOTE — ASSESSMENT & PLAN NOTE
In general blood pressure showing relatively good control  Patient continue present medication and surveillance  We will continue to monitor his renal function

## 2021-04-02 NOTE — ASSESSMENT & PLAN NOTE
As noted patient's hemoglobin A1c is now less than 7  He did go to see the endocrinologist and they made some modification treatment  He states he has not made any significant changes with his diet  Patient will continue present medication  We will continue to monitor his sugar levels and along with endocrinology make adjustment to medication if needed    Lab Results   Component Value Date    HGBA1C 6 6 (H) 03/24/2021

## 2021-04-02 NOTE — ASSESSMENT & PLAN NOTE
History of hyperlipidemia  He remains on fenofibrate period we have attempted in the past to place patient statin drugs but he is unable to tolerate secondary to myalgias  We will check a lipid profile with his next visit  Told to watch his intake fats and cholesterol with his diet    He has made some changes and has given up  On ice cream

## 2021-04-02 NOTE — ASSESSMENT & PLAN NOTE
Patient was placed on Sinemet 251  States that his tremor is completely resolved  Was unable to get an appointment be seen by Neurology in to July  Patient will continue present treatment  He was told if worsening symptoms to please call earlier for evaluation

## 2021-04-02 NOTE — ASSESSMENT & PLAN NOTE
Continues to take his vitamin-D supplements as previously  We will continue to monitor his levels and make adjustment medication  As needed

## 2021-04-02 NOTE — ASSESSMENT & PLAN NOTE
Patient is still having intermittent difficulty with arthritis in his right knee  He states he has good and bad days but it is been more pronounced the discomfort lately  No swelling no acute injury  We will send him for evaluation by Orthopedics  On visual examination no gross deformity  Full range of motion no effusion

## 2021-04-02 NOTE — ASSESSMENT & PLAN NOTE
With the patient's BMI he is still considered overweight  He states that he has some made some changes in diet in order to help with weight loss  Patient was told he needs to look very closely as caloric intake to help with his weight loss  He states that he is restricted with his exercise because of his knee pain and has an appointment to be seen by Orthopedics

## 2021-04-02 NOTE — PROGRESS NOTES
Assessment/Plan:    Acute pain of right knee   Patient is still having intermittent difficulty with arthritis in his right knee  He states he has good and bad days but it is been more pronounced the discomfort lately  No swelling no acute injury  We will send him for evaluation by Orthopedics  On visual examination no gross deformity  Full range of motion no effusion  Anxiety    Patient relates that much of his anxiety revolves around his wife who has chronic medical problems that have been extremely difficult  Patient will continue with present medication citalopram 10 mg daily  Was told worsening problems with anxiety please call  Benign essential hypertension    In general blood pressure showing relatively good control  Patient continue present medication and surveillance  We will continue to monitor his renal function  Hyperlipidemia    History of hyperlipidemia  He remains on fenofibrate period we have attempted in the past to place patient statin drugs but he is unable to tolerate secondary to myalgias  We will check a lipid profile with his next visit  Told to watch his intake fats and cholesterol with his diet  He has made some changes and has given up  On ice cream    Overweight   With the patient's BMI he is still considered overweight  He states that he has some made some changes in diet in order to help with weight loss  Patient was told he needs to look very closely as caloric intake to help with his weight loss  He states that he is restricted with his exercise because of his knee pain and has an appointment to be seen by Orthopedics  Parkinson's disease ([facility] )    Patient was placed on Sinemet 251  States that his tremor is completely resolved  Was unable to get an appointment be seen by Neurology in to July  Patient will continue present treatment  He was told if worsening symptoms to please call earlier for evaluation      Type 2 diabetes mellitus with hyperglycemia, without long-term current use of insulin (HCC)    As noted patient's hemoglobin A1c is now less than 7  He did go to see the endocrinologist and they made some modification treatment  He states he has not made any significant changes with his diet  Patient will continue present medication  We will continue to monitor his sugar levels and along with endocrinology make adjustment to medication if needed  Lab Results   Component Value Date    HGBA1C 6 6 (H) 03/24/2021       Vitamin D deficiency    Continues to take his vitamin-D supplements as previously  We will continue to monitor his levels and make adjustment medication  As needed  Diagnoses and all orders for this visit:    Acute pain of right knee  -     Ambulatory referral to Orthopedic Surgery; Future    Anxiety  -     citalopram (CeleXA) 10 mg tablet; Take 1 tablet (10 mg total) by mouth daily    Type 2 diabetes mellitus with hyperglycemia, without long-term current use of insulin (HCC)  -     Hemoglobin A1C; Future  -     Microalbumin / creatinine urine ratio    Parkinson's disease ([facility] )    Benign essential hypertension  -     Comprehensive metabolic panel; Future  -     CBC and differential; Future  -     Lipid panel; Future  -     UA (URINE) with reflex to Scope; Future    Mixed hyperlipidemia  -     Lipid panel; Future    Overweight    Vitamin D deficiency    Healthcare maintenance  -     Comprehensive metabolic panel; Future  -     CBC and differential; Future  -     Lipid panel; Future  -     UA (URINE) with reflex to Scope; Future          Subjective:      Patient ID: Zoran Garza  is a 72 y o  male  Patient is a 78-year-old male history of multiple medical problems as outlined previously  Patient is here today for routine follow-up  Patient states he is very proud of the fact that his hemoglobin A1c is now down below 7    He relates that he is taking the medication as directed by his endocrinologist   He is watching his diet more closely and he states  1 of the big changes that he has given up eating ice cream  Only complaint today is continued discomfort in his right knee      The following portions of the patient's history were reviewed and updated as appropriate: He  has a past medical history of DM (diabetes mellitus), type 2 ([facility] )  He   Patient Active Problem List    Diagnosis Date Noted    Parkinson's disease ([facility] ) 11/13/2020    Vitamin D deficiency 10/21/2020    Overweight 02/10/2020    Acute pain of right knee 10/08/2018    Healthcare maintenance 05/22/2018    Acute sinusitis 12/29/2017    Type 2 diabetes mellitus with hyperglycemia, without long-term current use of insulin ([place] [address] ) 08/04/2014    Anxiety 09/20/2012    Benign essential hypertension 09/20/2012    Hyperlipidemia 09/20/2012     He  has a past surgical history that includes Knee surgery (Right)  His family history includes Diabetes unspecified in his mother  He  reports that he has never smoked  He has never used smokeless tobacco  He reports current alcohol use of about 3 0 standard drinks of alcohol per week  He reports that he does not use drugs    Current Outpatient Medications   Medication Sig Dispense Refill    aspirin 81 mg chewable tablet Chew 1 tablet daily      bisoprolol-hydrochlorothiazide (ZIAC) 5-6 25 MG per tablet Take 1 tablet by mouth daily 90 tablet 3    carbidopa-levodopa (SINEMET)  mg per tablet Take 1 tablet by mouth 2 (two) times a day 60 tablet 3    cholecalciferol (VITAMIN D3) 1,000 units tablet Take 1 tablet (1,000 Units total) by mouth daily      citalopram (CeleXA) 10 mg tablet Take 1 tablet (10 mg total) by mouth daily 90 tablet 3    fenofibrate (TRICOR) 145 mg tablet TAKE 1 TABLET (145 MG TOTAL) BY MOUTH DAILY 90 tablet 3    glimepiride (AMARYL) 4 mg tablet TAKE 1 TABLET EVERY DAY 90 tablet 3    glucose blood (ACCU-CHEK GUIDE) test strip Use to test blood sugars twice daily as instructed 100 each 1    Lancets MISC by Does not apply route 2 (two) times a day      losartan (COZAAR) 50 mg tablet Take 1 tablet (50 mg total) by mouth daily 90 tablet 3    metFORMIN (GLUCOPHAGE-XR) 500 mg 24 hr tablet take 2 tablets by mouth twice a day 360 tablet 1    sitaGLIPtin (JANUVIA) 100 mg tablet Take 1 tablet (100 mg total) by mouth daily 30 tablet 4     No current facility-administered medications for this visit  Current Outpatient Medications on File Prior to Visit   Medication Sig    aspirin 81 mg chewable tablet Chew 1 tablet daily    bisoprolol-hydrochlorothiazide (ZIAC) 5-6 25 MG per tablet Take 1 tablet by mouth daily    carbidopa-levodopa (SINEMET)  mg per tablet Take 1 tablet by mouth 2 (two) times a day    cholecalciferol (VITAMIN D3) 1,000 units tablet Take 1 tablet (1,000 Units total) by mouth daily    fenofibrate (TRICOR) 145 mg tablet TAKE 1 TABLET (145 MG TOTAL) BY MOUTH DAILY    glimepiride (AMARYL) 4 mg tablet TAKE 1 TABLET EVERY DAY    glucose blood (ACCU-CHEK GUIDE) test strip Use to test blood sugars twice daily as instructed    Lancets MISC by Does not apply route 2 (two) times a day    losartan (COZAAR) 50 mg tablet Take 1 tablet (50 mg total) by mouth daily    metFORMIN (GLUCOPHAGE-XR) 500 mg 24 hr tablet take 2 tablets by mouth twice a day    sitaGLIPtin (JANUVIA) 100 mg tablet Take 1 tablet (100 mg total) by mouth daily    [DISCONTINUED] citalopram (CeleXA) 10 mg tablet Take 1 tablet (10 mg total) by mouth daily     No current facility-administered medications on file prior to visit  He has No Known Allergies       Review of Systems   Constitutional: Positive for activity change ( states that discomfort in his right knee has limited is activity level)  Negative for appetite change, chills, diaphoresis, fatigue, fever and unexpected weight change  HENT: Negative  Eyes: Negative  Respiratory: Negative  Cardiovascular: Negative  Gastrointestinal: Negative      Endocrine: Negative  Genitourinary: Negative  Musculoskeletal: Negative  Skin: Negative  Allergic/Immunologic: Negative  Neurological: Negative  Hematological: Negative  Psychiatric/Behavioral: Negative  Objective:      /86   Pulse 82   Temp 97 5 °F (36 4 °C) (Tympanic)   Ht 5' 11" (1 803 m)   Wt 95 7 kg (211 lb)   SpO2 97%   BMI 29 43 kg/m²          Physical Exam  Vitals signs and nursing note reviewed  Constitutional:       General: He is not in acute distress  Appearance: Normal appearance  He is not ill-appearing, toxic-appearing or diaphoretic  Comments:  Next extremely cheerful overweight 43-year-old male who is awake alert no acute distress anx3   HENT:      Head: Normocephalic and atraumatic  Right Ear: Tympanic membrane, ear canal and external ear normal  There is no impacted cerumen  Left Ear: Tympanic membrane, ear canal and external ear normal  There is no impacted cerumen  Nose: Nose normal  No congestion or rhinorrhea  Mouth/Throat:      Mouth: Mucous membranes are moist       Pharynx: Oropharynx is clear  No oropharyngeal exudate or posterior oropharyngeal erythema  Eyes:      General: No scleral icterus  Right eye: No discharge  Left eye: No discharge  Extraocular Movements: Extraocular movements intact  Conjunctiva/sclera: Conjunctivae normal       Pupils: Pupils are equal, round, and reactive to light  Neck:      Musculoskeletal: Normal range of motion and neck supple  No neck rigidity or muscular tenderness  Vascular: No carotid bruit  Cardiovascular:      Rate and Rhythm: Normal rate and regular rhythm  Pulses: Normal pulses  Heart sounds: Normal heart sounds  No murmur  No friction rub  No gallop  Pulmonary:      Effort: Pulmonary effort is normal  No respiratory distress  Breath sounds: Normal breath sounds  No stridor  No wheezing, rhonchi or rales     Chest:      Chest wall: No tenderness  Abdominal:      General: Bowel sounds are normal  There is no distension  Palpations: Abdomen is soft  There is no mass  Tenderness: There is no abdominal tenderness  There is no right CVA tenderness, left CVA tenderness, guarding or rebound  Hernia: No hernia is present  Comments:  overweight   Musculoskeletal: Normal range of motion  General: Deformity ( no gross deformity crepitus with movement to his right knee  No effusion erythema or warmth  Does have full range of motion) present  No swelling, tenderness or signs of injury  Right lower leg: No edema  Left lower leg: No edema  Lymphadenopathy:      Cervical: No cervical adenopathy  Skin:     General: Skin is warm and dry  Capillary Refill: Capillary refill takes less than 2 seconds  Coloration: Skin is not jaundiced or pale  Findings: No bruising, erythema, lesion or rash  Neurological:      General: No focal deficit present  Mental Status: He is alert and oriented to person, place, and time  Mental status is at baseline  Cranial Nerves: No cranial nerve deficit  Sensory: No sensory deficit  Motor: No weakness  Coordination: Coordination normal       Gait: Gait normal       Deep Tendon Reflexes: Reflexes normal    Psychiatric:         Mood and Affect: Mood normal          Behavior: Behavior normal          Thought Content:  Thought content normal          Judgment: Judgment normal

## 2021-04-02 NOTE — ASSESSMENT & PLAN NOTE
Patient relates that much of his anxiety revolves around his wife who has chronic medical problems that have been extremely difficult  Patient will continue with present medication citalopram 10 mg daily  Was told worsening problems with anxiety please call

## 2021-05-06 ENCOUNTER — HOSPITAL ENCOUNTER (OUTPATIENT)
Dept: RADIOLOGY | Facility: HOSPITAL | Age: 71
Discharge: HOME | End: 2021-05-06
Attending: ORTHOPAEDIC SURGERY
Payer: MEDICARE

## 2021-05-06 ENCOUNTER — OFFICE VISIT (OUTPATIENT)
Dept: OBGYN CLINIC | Facility: HOSPITAL | Age: 71
End: 2021-05-06
Payer: MEDICARE

## 2021-05-06 VITALS
BODY MASS INDEX: 29.96 KG/M2 | HEIGHT: 71 IN | SYSTOLIC BLOOD PRESSURE: 137 MMHG | DIASTOLIC BLOOD PRESSURE: 78 MMHG | WEIGHT: 214 LBS | HEART RATE: 58 BPM

## 2021-05-06 DIAGNOSIS — M25.561 RIGHT KNEE PAIN, UNSPECIFIED CHRONICITY: ICD-10-CM

## 2021-05-06 DIAGNOSIS — M25.561 ACUTE PAIN OF RIGHT KNEE: ICD-10-CM

## 2021-05-06 DIAGNOSIS — M17.11 PRIMARY OSTEOARTHRITIS OF RIGHT KNEE: Primary | ICD-10-CM

## 2021-05-06 PROCEDURE — 20610 DRAIN/INJ JOINT/BURSA W/O US: CPT | Performed by: ORTHOPAEDIC SURGERY

## 2021-05-06 PROCEDURE — 99204 OFFICE O/P NEW MOD 45 MIN: CPT | Performed by: ORTHOPAEDIC SURGERY

## 2021-05-06 PROCEDURE — 73560 X-RAY EXAM OF KNEE 1 OR 2: CPT

## 2021-05-06 RX ORDER — BUPIVACAINE HYDROCHLORIDE 2.5 MG/ML
2 INJECTION, SOLUTION INFILTRATION; PERINEURAL
Status: COMPLETED | OUTPATIENT
Start: 2021-05-06 | End: 2021-05-06

## 2021-05-06 RX ORDER — METHYLPREDNISOLONE ACETATE 40 MG/ML
2 INJECTION, SUSPENSION INTRA-ARTICULAR; INTRALESIONAL; INTRAMUSCULAR; SOFT TISSUE
Status: COMPLETED | OUTPATIENT
Start: 2021-05-06 | End: 2021-05-06

## 2021-05-06 RX ADMIN — BUPIVACAINE HYDROCHLORIDE 2 ML: 2.5 INJECTION, SOLUTION INFILTRATION; PERINEURAL at 13:32

## 2021-05-06 RX ADMIN — METHYLPREDNISOLONE ACETATE 2 ML: 40 INJECTION, SUSPENSION INTRA-ARTICULAR; INTRALESIONAL; INTRAMUSCULAR; SOFT TISSUE at 13:32

## 2021-05-06 NOTE — PROGRESS NOTES
Assessment:  1  Primary osteoarthritis of right knee  Large joint arthrocentesis: R knee    CANCELED: XR knee 3 vw right non injury   2  Acute pain of right knee  Ambulatory referral to Orthopedic Surgery    Large joint arthrocentesis: R knee       Plan:     Patient has medial compartment right nee osteoarthritis    WBAT right LE    Discussed with patient conservative treatments: steroid injections, exercises medications, bracing, and visco supplementation injections   Provided patient with VMO strengthening exercises    Received right knee steroid injection today   Follow up in 3 months         The above stated was discussed in layman's terms and the patient expressed understanding  All questions were answered to the patient's satisfaction  Subjective:   Carson Franks  is a 72 y o  male who presents today for right knee pain  He states he has been having pain for 3 weeks, denies any injury  He is having generalized right knee pain  He notes pain is worse with prolong walking and increase activities  He is taking Tylenol and Motrin  as needed for pain  He has been using over the counter brace with some relief  Review of systems negative unless otherwise specified in HPI    Past Medical History:   Diagnosis Date    DM (diabetes mellitus), type 2 ([facility] [address] )        Past Surgical History:   Procedure Laterality Date    KNEE SURGERY Right        Family History   Problem Relation Age of Onset    Diabetes unspecified Mother        Social History     Occupational History    Not on file   Tobacco Use    Smoking status: Never Smoker    Smokeless tobacco: Never Used   Substance and Sexual Activity    Alcohol use:  Yes     Alcohol/week: 3 0 standard drinks     Types: 3 Cans of beer per week     Frequency: 2-3 times a week    Drug use: Never    Sexual activity: Not on file         Current Outpatient Medications:     aspirin 81 mg chewable tablet, Chew 1 tablet daily, Disp: , Rfl:    bisoprolol-hydrochlorothiazide (ZIAC) 5-6 25 MG per tablet, Take 1 tablet by mouth daily, Disp: 90 tablet, Rfl: 3    citalopram (CeleXA) 10 mg tablet, Take 1 tablet (10 mg total) by mouth daily, Disp: 90 tablet, Rfl: 3    fenofibrate (TRICOR) 145 mg tablet, TAKE 1 TABLET (145 MG TOTAL) BY MOUTH DAILY, Disp: 90 tablet, Rfl: 3    glimepiride (AMARYL) 4 mg tablet, TAKE 1 TABLET EVERY DAY, Disp: 90 tablet, Rfl: 3    glucose blood (ACCU-CHEK GUIDE) test strip, Use to test blood sugars twice daily as instructed, Disp: 100 each, Rfl: 1    Lancets MISC, by Does not apply route 2 (two) times a day, Disp: , Rfl:     losartan (COZAAR) 50 mg tablet, Take 1 tablet (50 mg total) by mouth daily, Disp: 90 tablet, Rfl: 3    metFORMIN (GLUCOPHAGE-XR) 500 mg 24 hr tablet, take 2 tablets by mouth twice a day, Disp: 360 tablet, Rfl: 1    sitaGLIPtin (JANUVIA) 100 mg tablet, Take 1 tablet (100 mg total) by mouth daily, Disp: 30 tablet, Rfl: 4    carbidopa-levodopa (SINEMET)  mg per tablet, Take 1 tablet by mouth 2 (two) times a day (Patient not taking: Reported on 5/6/2021), Disp: 60 tablet, Rfl: 3    cholecalciferol (VITAMIN D3) 1,000 units tablet, Take 1 tablet (1,000 Units total) by mouth daily (Patient not taking: Reported on 5/6/2021), Disp:  , Rfl:     No Known Allergies         Vitals:    05/06/21 1310   BP: 137/78   Pulse: 58       Objective:            Physical Exam  · General: Awake, Alert, Oriented  · Eyes: Pupils equal, round and reactive to light  · Heart: regular rate and rhythm  · Lungs: No audible wheezing  · Abdomen: soft                    Ortho Exam  Right knee  No lacerations, no abrasions, no open wounds  No erythema  AROM 0-100  TTP over medial and lateral joint lines  Stable to varus and valgus stress  Crepitus with ROM   Negative Patella grind test  Negative Lachman's test  Negative Posterior Drawer test  Neurovascularly Intact Distally     Diagnostics, reviewed and taken today if performed as documented: The attending physician has personally reviewed the pertinent films in PACS and interpretation is as follows:x-ray right knee shows medial compartment osteoarthritis       Procedures, if performed today:    Large joint arthrocentesis: R knee  Universal Protocol:  Consent: Verbal consent obtained  Risks and benefits: risks, benefits and alternatives were discussed  Consent given by: patient  Time out: Immediately prior to procedure a "time out" was called to verify the correct patient, procedure, equipment, support staff and site/side marked as required  Timeout called at: 5/6/2021 1:32 PM   Patient understanding: patient states understanding of the procedure being performed  Site marked: the operative site was marked  Supporting Documentation  Indications: pain   Procedure Details  Location: knee - R knee  Preparation: Patient was prepped and draped in the usual sterile fashion  Needle size: 22 G  Ultrasound guidance: no  Approach: anterolateral  Medications administered: 2 mL methylPREDNISolone acetate 40 mg/mL; 2 mL bupivacaine 0 25 %    Patient tolerance: patient tolerated the procedure well with no immediate complications  Dressing:  Sterile dressing applied          None performed      Scribe Attestation    I,:  steffi Rodriguez am acting as a scribe while in the presence of the attending physician :       I,:  Hans Garcia MD personally performed the services described in this documentation    as scribed in my presence :             Portions of the record may have been created with voice recognition software  Occasional wrong word or "sound a like" substitutions may have occurred due to the inherent limitations of voice recognition software  Read the chart carefully and recognize, using context, where substitutions have occurred

## 2021-05-16 DIAGNOSIS — E11.9 TYPE 2 DIABETES MELLITUS WITHOUT COMPLICATION, WITHOUT LONG-TERM CURRENT USE OF INSULIN (HCC): ICD-10-CM

## 2021-05-17 RX ORDER — METFORMIN HYDROCHLORIDE 500 MG/1
TABLET, EXTENDED RELEASE ORAL
Qty: 180 TABLET | Refills: 3 | Status: SHIPPED | OUTPATIENT
Start: 2021-05-17 | End: 2022-03-08

## 2021-05-28 ENCOUNTER — TELEPHONE (OUTPATIENT)
Dept: INTERNAL MEDICINE CLINIC | Facility: CLINIC | Age: 71
End: 2021-05-28

## 2021-05-28 NOTE — TELEPHONE ENCOUNTER
Patients wife called and is asking for her  to get a colonoscopy and she wants to know if he would need a referral from you and he went to Dr Marily Moreno in the past   She can be reached at [phone]    Thank you

## 2021-06-01 DIAGNOSIS — Z12.11 COLON CANCER SCREENING: Primary | ICD-10-CM

## 2021-06-01 NOTE — TELEPHONE ENCOUNTER
The dr you referred him to is in [place]  He stated that the dr Anh Plunkett in [address] [place] [address] may not be in practice anymore  I could not find him in google  He stated that martin is fine     Can you change the referral?

## 2021-06-28 DIAGNOSIS — I10 ESSENTIAL HYPERTENSION: ICD-10-CM

## 2021-06-28 RX ORDER — BISOPROLOL FUMARATE AND HYDROCHLOROTHIAZIDE 5; 6.25 MG/1; MG/1
1 TABLET ORAL DAILY
Qty: 90 TABLET | Refills: 3 | Status: SHIPPED | OUTPATIENT
Start: 2021-06-28 | End: 2022-03-28 | Stop reason: SDUPTHER

## 2021-07-07 ENCOUNTER — TELEPHONE (OUTPATIENT)
Dept: NEUROLOGY | Facility: CLINIC | Age: 71
End: 2021-07-07

## 2021-07-07 NOTE — TELEPHONE ENCOUNTER
Called pt and informed him that I see that he has an appt coming up w/ Dr Madie Joe on 07/26/2021 at 1:00 PM and we were wondering if he would be able to come in at 8:30 AM instead  Patient agreed to the time change and was scheduled  Patient also was not sure where we are located so I did inform the patient of address as well as landmarks near the office to help  He was thankful for the help

## 2021-07-26 ENCOUNTER — CONSULT (OUTPATIENT)
Dept: NEUROLOGY | Facility: CLINIC | Age: 71
End: 2021-07-26
Payer: MEDICARE

## 2021-07-26 VITALS
DIASTOLIC BLOOD PRESSURE: 74 MMHG | BODY MASS INDEX: 28.79 KG/M2 | WEIGHT: 206.4 LBS | SYSTOLIC BLOOD PRESSURE: 138 MMHG | HEART RATE: 84 BPM

## 2021-07-26 DIAGNOSIS — G20 PARKINSON'S DISEASE (HCC): ICD-10-CM

## 2021-07-26 DIAGNOSIS — G25.0 ESSENTIAL TREMOR: ICD-10-CM

## 2021-07-26 PROCEDURE — 99204 OFFICE O/P NEW MOD 45 MIN: CPT | Performed by: PSYCHIATRY & NEUROLOGY

## 2021-07-26 NOTE — PROGRESS NOTES
Patient ID: Jj Sultana  is a 80 y o  male  Assessment/Plan:    Essential tremor  Slowly progressive action and intentional tremors of the hands, vocal and head tremor without any parkinsonian symptoms, consistent with  essential tremor  We discussed the slowly progressive nature of this condition, and medication  and surgical treatment options  Tremor is mild and does not necessarily need treatment  Will discontinue Sinemet  At this point he is only taking it once daily with no improvement and  has not been adherent to the regimen  Mixed rest, action and postural tremors,  can occur with Parkinson's disease but at this time he does not have bradykinesia suggestive of PD  Given tremor  Is not bothersome, he will remain off medications  Will follow up yearly to monitor for signs of progression to suggest another cause and call if tremor is worse or he develops additional symptoms  Diagnoses and all orders for this visit:    Parkinson's disease [facility])  -     Ambulatory referral to Neurology    Essential tremor           Subjective:    Mr Hemalatha Ornelas is a man with Type 2 DM, anxiety and HTN who presents for Movement disorder evaluation of Parkinson's disease, with occasional tingling of the left hand  Tremor noted intermittently since at least Fall 2020  Back in Oct/Noc 2020 he noted an intermittent left hand tremor  He mentioned this to his PCP who after examine felt this may be suggestive of early PD and started Sinemet 25/100 1 tab twice daily  He has only taken it once daily in the am  He tends to forget to take it some days  He doubts the diagnosis of PD as he has not noted any improvements  No side effects to the medication         Tremors does not interfere with eating, using utensils and drinking  He has not trouble buttoning or dressing  He is the primary caregiver of his wife who is ill  He assists her with all activities of daily living    He performs much of the household activities at home without difficulty  Handwriting is   Unchanged  He drinks about 3 beers every Friday night  He has not noticed any improvement of tremor with alcohol but rarely notices tremors to begin with  There is no associated weakness, or posturing  He denies any issues with gait or stiffness  He has rare tingling of the fingertips  His mother, maternal grandfather, two maternal cousins had tremors  There is no family history of Parkinson's disease  Speech is soft  He denies any drooling  No difficulty chewing and swallowing  He has mild slowness with dressing and hygiene acts  Handwriting is micrographic  He has no difficulty arising out of chair  Gait is unchanged  He sleeps well  No changes in cognition  He manages his on finances  There are no changes in olfaction  Objective:    Blood pressure 138/74, pulse 84, weight 93 6 kg (206 lb 6 4 oz)  Physical Exam  Eyes:      Extraocular Movements: Extraocular movements intact  Pupils: Pupils are equal, round, and reactive to light  Neurological:      Mental Status: He is alert  Deep Tendon Reflexes: Strength normal       Reflex Scores:       Tricep reflexes are 1+ on the right side and 1+ on the left side  Bicep reflexes are 1+ on the right side and 1+ on the left side  Patellar reflexes are 2+ on the right side and 2+ on the left side  Psychiatric:         Speech: Speech normal          Neurological Exam  Mental Status  Alert  Oriented only to person, place and situation  Orientation: Knows years and month  Date 25   Speech is normal  Language is fluent with no aphasia  Attention and concentration are normal     Cranial Nerves  CN II: Visual fields full to confrontation  CN III, IV, VI: Extraocular movements intact bilaterally  Pupils equal round and reactive to light bilaterally  CN V: Facial sensation is normal   CN VII: Full and symmetric facial movement    CN VIII: Hearing is normal   CN IX, X: Palate elevates symmetrically  CN XI: Shoulder shrug strength is normal   CN XII: Tongue midline without atrophy or fasciculations  Motor   Normal muscle tone  Strength is 5/5 throughout all four extremities  Sensory  Light touch is normal in upper and lower extremities  Pinprick is normal in upper and lower extremities  Vibration is normal in upper and lower extremities  Reflexes                                           Right                      Left  Biceps                                 1+                         1+  Triceps                                1+                         1+  Patellar                                2+                         2+  Glabellar tap absent  Coordination  Right: Finger-to-nose abnormality: Rapid alternating movement abnormality:  Left: Finger-to-nose abnormality: Rapid alternating movement abnormality:  See MDS UPDRS III  Examined having not taken Sinemet for over 24 hours  No bradykinesia  Mild postural and intentional tremor  Right thumb rest tremor only present when relaxed and asked to say months backward  No head or vocal tremor  Handwriting normal    Mild tremor on Spirals on right  Slight on left        Gait  Casual gait: Abnormal pull test  Able to rise from chair without using arms        MDS UPDRS III                                 Time since last dose:    Speech  0   Facial Expression  0   Rigidity - Neck  0   Rigidity - Upper Extremity (Right)  0   Rigidity - Upper Extremity (Left)   0   Rigidity - Lower Extremity (Right)  0   Rigidity - Lower Extremity (Left)   0   Finger Taps (Right)   0   Finger Taps (Left)   0   Hand Movement (Right)  0   Hand Movement (Left)   0   Pronation/Supination (Right)  0   Pronation/Supination (Left)   0   Toe Tapping (Right) 0   Toe Tapping (Left) 0   Leg Agility (Right)  0   Leg Agility (Left)   0   Arising from Chair   0   Gait   0   Freezing of Gait 0   Postural Stability   1   Posture 0   Global spontaneity of movement 0   Postural Tremor (Right) 1   Postural Tremor (Left) 1   Kinetic Tremor (Right)  1   Kinetic Tremor (Left)  1   Rest tremor amplitude RUE 1 thumb only with augmentation   Rest tremor amplitude LUE 1   Rest tremor amplitude RLE 0   Reset tremor amplitude LLE 0   Lip/Jaw Tremor  0   Consistency of tremor 1   Motor Exam Total:            ROS:    Review of Systems   Constitutional: Negative  Negative for appetite change and fever  HENT: Negative  Negative for hearing loss, tinnitus, trouble swallowing and voice change  Eyes: Negative  Negative for photophobia and pain  Respiratory: Negative  Negative for shortness of breath  Cardiovascular: Negative  Negative for palpitations  Gastrointestinal: Negative  Negative for nausea and vomiting  Endocrine: Negative  Negative for cold intolerance  Genitourinary: Negative  Negative for dysuria, frequency and urgency  Musculoskeletal: Negative  Negative for myalgias and neck pain  Skin: Negative  Negative for rash  Allergic/Immunologic: Negative  Neurological: Negative for dizziness, tremors, seizures, syncope, facial asymmetry, speech difficulty, weakness, light-headedness, numbness and headaches  Tingling in left hand at times     Hematological: Negative  Does not bruise/bleed easily  Psychiatric/Behavioral: Negative  Negative for confusion, hallucinations and sleep disturbance  All other systems reviewed and are negative  Review of system was personally reviewed

## 2021-07-26 NOTE — ASSESSMENT & PLAN NOTE
Slowly progressive action and intentional tremors of the hands, vocal and head tremor without any parkinsonian symptoms, consistent with  essential tremor  We discussed the slowly progressive nature of this condition, and medication  and surgical treatment options  Tremor is mild and does not necessarily need treatment  Will discontinue Sinemet  At this point he is only taking it once daily with no improvement and  has not been adherent to the regimen  Mixed rest, action and postural tremors,  can occur with Parkinson's disease but at this time he does not have bradykinesia suggestive of PD  Given tremor  Is not bothersome, he will remain off medications  Will follow up yearly to monitor for signs of progression to suggest another cause and call if tremor is worse or he develops additional symptoms

## 2021-07-26 NOTE — PATIENT INSTRUCTIONS
Slowly progressive action and intentional tremors of the hands, vocal and head tremor without any parkinsonian symptoms, consistent with  essential tremor  We discussed the slowly progressive nature of this condition, and medication treatment options  Tremor is mild and hamm snot necessarily need treatment  Will discontinue Sinemet,   Will follow up yearly and call if tremor is worse or he develops additional symptoms

## 2021-08-03 ENCOUNTER — APPOINTMENT (OUTPATIENT)
Dept: LAB | Facility: HOSPITAL | Age: 71
End: 2021-08-03
Payer: MEDICARE

## 2021-08-03 DIAGNOSIS — I10 BENIGN ESSENTIAL HYPERTENSION: ICD-10-CM

## 2021-08-03 DIAGNOSIS — E78.2 MIXED HYPERLIPIDEMIA: ICD-10-CM

## 2021-08-03 DIAGNOSIS — E11.65 TYPE 2 DIABETES MELLITUS WITH HYPERGLYCEMIA, WITHOUT LONG-TERM CURRENT USE OF INSULIN (HCC): ICD-10-CM

## 2021-08-03 DIAGNOSIS — Z00.00 HEALTHCARE MAINTENANCE: ICD-10-CM

## 2021-08-03 LAB
ALBUMIN SERPL BCP-MCNC: 3.8 G/DL (ref 3.5–5)
ALP SERPL-CCNC: 31 U/L (ref 46–116)
ALT SERPL W P-5'-P-CCNC: 25 U/L (ref 12–78)
ANION GAP SERPL CALCULATED.3IONS-SCNC: 2 MMOL/L (ref 4–13)
AST SERPL W P-5'-P-CCNC: 38 U/L (ref 5–45)
BASOPHILS # BLD AUTO: 0.06 THOUSANDS/ΜL (ref 0–0.1)
BASOPHILS NFR BLD AUTO: 1 % (ref 0–1)
BILIRUB SERPL-MCNC: 0.5 MG/DL (ref 0.2–1)
BILIRUB UR QL STRIP: NEGATIVE
BUN SERPL-MCNC: 18 MG/DL (ref 5–25)
CALCIUM SERPL-MCNC: 8.9 MG/DL (ref 8.3–10.1)
CHLORIDE SERPL-SCNC: 109 MMOL/L (ref 100–108)
CHOLEST SERPL-MCNC: 167 MG/DL (ref 50–200)
CLARITY UR: CLEAR
CO2 SERPL-SCNC: 27 MMOL/L (ref 21–32)
COLOR UR: NORMAL
CREAT SERPL-MCNC: 1.01 MG/DL (ref 0.6–1.3)
CREAT UR-MCNC: 180 MG/DL
EOSINOPHIL # BLD AUTO: 0.19 THOUSAND/ΜL (ref 0–0.61)
EOSINOPHIL NFR BLD AUTO: 3 % (ref 0–6)
ERYTHROCYTE [DISTWIDTH] IN BLOOD BY AUTOMATED COUNT: 13.9 % (ref 11.6–15.1)
EST. AVERAGE GLUCOSE BLD GHB EST-MCNC: 140 MG/DL
GFR SERPL CREATININE-BSD FRML MDRD: 75 ML/MIN/1.73SQ M
GLUCOSE P FAST SERPL-MCNC: 102 MG/DL (ref 65–99)
GLUCOSE UR STRIP-MCNC: NEGATIVE MG/DL
HBA1C MFR BLD: 6.5 %
HCT VFR BLD AUTO: 47.5 % (ref 36.5–49.3)
HDLC SERPL-MCNC: 45 MG/DL
HGB BLD-MCNC: 15.7 G/DL (ref 12–17)
HGB UR QL STRIP.AUTO: NEGATIVE
IMM GRANULOCYTES # BLD AUTO: 0.02 THOUSAND/UL (ref 0–0.2)
IMM GRANULOCYTES NFR BLD AUTO: 0 % (ref 0–2)
KETONES UR STRIP-MCNC: NEGATIVE MG/DL
LDLC SERPL CALC-MCNC: 98 MG/DL (ref 0–100)
LEUKOCYTE ESTERASE UR QL STRIP: NEGATIVE
LYMPHOCYTES # BLD AUTO: 1.67 THOUSANDS/ΜL (ref 0.6–4.47)
LYMPHOCYTES NFR BLD AUTO: 26 % (ref 14–44)
MCH RBC QN AUTO: 28.9 PG (ref 26.8–34.3)
MCHC RBC AUTO-ENTMCNC: 33.1 G/DL (ref 31.4–37.4)
MCV RBC AUTO: 87 FL (ref 82–98)
MICROALBUMIN UR-MCNC: 9.4 MG/L (ref 0–20)
MICROALBUMIN/CREAT 24H UR: 5 MG/G CREATININE (ref 0–30)
MONOCYTES # BLD AUTO: 0.8 THOUSAND/ΜL (ref 0.17–1.22)
MONOCYTES NFR BLD AUTO: 12 % (ref 4–12)
NEUTROPHILS # BLD AUTO: 3.71 THOUSANDS/ΜL (ref 1.85–7.62)
NEUTS SEG NFR BLD AUTO: 58 % (ref 43–75)
NITRITE UR QL STRIP: NEGATIVE
NONHDLC SERPL-MCNC: 122 MG/DL
NRBC BLD AUTO-RTO: 0 /100 WBCS
PH UR STRIP.AUTO: 5.5 [PH]
PLATELET # BLD AUTO: 180 THOUSANDS/UL (ref 149–390)
PMV BLD AUTO: 10.6 FL (ref 8.9–12.7)
POTASSIUM SERPL-SCNC: 4.7 MMOL/L (ref 3.5–5.3)
PROT SERPL-MCNC: 7.4 G/DL (ref 6.4–8.2)
PROT UR STRIP-MCNC: NEGATIVE MG/DL
RBC # BLD AUTO: 5.44 MILLION/UL (ref 3.88–5.62)
SODIUM SERPL-SCNC: 138 MMOL/L (ref 136–145)
SP GR UR STRIP.AUTO: 1.02 (ref 1–1.03)
TRIGL SERPL-MCNC: 122 MG/DL
UROBILINOGEN UR QL STRIP.AUTO: 1 E.U./DL
WBC # BLD AUTO: 6.45 THOUSAND/UL (ref 4.31–10.16)

## 2021-08-03 PROCEDURE — 82570 ASSAY OF URINE CREATININE: CPT | Performed by: INTERNAL MEDICINE

## 2021-08-03 PROCEDURE — 85025 COMPLETE CBC W/AUTO DIFF WBC: CPT

## 2021-08-03 PROCEDURE — 36415 COLL VENOUS BLD VENIPUNCTURE: CPT

## 2021-08-03 PROCEDURE — 81003 URINALYSIS AUTO W/O SCOPE: CPT

## 2021-08-03 PROCEDURE — 80053 COMPREHEN METABOLIC PANEL: CPT

## 2021-08-03 PROCEDURE — 82043 UR ALBUMIN QUANTITATIVE: CPT | Performed by: INTERNAL MEDICINE

## 2021-08-03 PROCEDURE — 83036 HEMOGLOBIN GLYCOSYLATED A1C: CPT

## 2021-08-03 PROCEDURE — 80061 LIPID PANEL: CPT

## 2021-08-10 ENCOUNTER — OFFICE VISIT (OUTPATIENT)
Dept: OBGYN CLINIC | Facility: HOSPITAL | Age: 71
End: 2021-08-10
Payer: MEDICARE

## 2021-08-10 VITALS
DIASTOLIC BLOOD PRESSURE: 80 MMHG | HEART RATE: 60 BPM | SYSTOLIC BLOOD PRESSURE: 134 MMHG | BODY MASS INDEX: 29.01 KG/M2 | WEIGHT: 207.2 LBS | HEIGHT: 71 IN

## 2021-08-10 DIAGNOSIS — M17.11 PRIMARY OSTEOARTHRITIS OF RIGHT KNEE: Primary | ICD-10-CM

## 2021-08-10 PROCEDURE — 20610 DRAIN/INJ JOINT/BURSA W/O US: CPT | Performed by: PHYSICIAN ASSISTANT

## 2021-08-10 PROCEDURE — 99213 OFFICE O/P EST LOW 20 MIN: CPT | Performed by: PHYSICIAN ASSISTANT

## 2021-08-10 RX ORDER — METHYLPREDNISOLONE ACETATE 40 MG/ML
2 INJECTION, SUSPENSION INTRA-ARTICULAR; INTRALESIONAL; INTRAMUSCULAR; SOFT TISSUE
Status: COMPLETED | OUTPATIENT
Start: 2021-08-10 | End: 2021-08-10

## 2021-08-10 RX ORDER — BUPIVACAINE HYDROCHLORIDE 2.5 MG/ML
2 INJECTION, SOLUTION INFILTRATION; PERINEURAL
Status: COMPLETED | OUTPATIENT
Start: 2021-08-10 | End: 2021-08-10

## 2021-08-10 RX ADMIN — BUPIVACAINE HYDROCHLORIDE 2 ML: 2.5 INJECTION, SOLUTION INFILTRATION; PERINEURAL at 11:14

## 2021-08-10 RX ADMIN — METHYLPREDNISOLONE ACETATE 2 ML: 40 INJECTION, SUSPENSION INTRA-ARTICULAR; INTRALESIONAL; INTRAMUSCULAR; SOFT TISSUE at 11:14

## 2021-08-10 NOTE — PROGRESS NOTES
Orthopedics          Paulo Monsivais Tera  80 y o  male MRN: [id number]      Chief Complaint:   right knee pain    HPI:   80 y o male complaining of right knee pain  Patient presents office today regarding right knee pain due to osteoarthritis  Patient has previously been diagnosed with right knee pain due to osteoarthritis  Patient did receive intra-articular injection 3 months ago significant pain relief however the pain since returned right knee  Patient localized the pain to his right knee without any radiation denies instability clicking or catching  Denies any changes numbness tingling lower extremity  Review Of Systems:   · Skin: Normal  · Neuro: See HPI  · Musculoskeletal: See HPI  · All other systems reviewed and are negative    Past Medical History:   Past Medical History:   Diagnosis Date    DM (diabetes mellitus), type 2 ([address] )        Past Surgical History:   Past Surgical History:   Procedure Laterality Date    KNEE SURGERY Right        Family History:  Family history reviewed and non-contributory  Family History   Problem Relation Age of Onset    Diabetes unspecified Mother          Social History:  Social History     Socioeconomic History    Marital status: /Civil Union     Spouse name: None    Number of children: None    Years of education: None    Highest education level: None   Occupational History    None   Tobacco Use    Smoking status: Never Smoker    Smokeless tobacco: Never Used   Vaping Use    Vaping Use: Never used   Substance and Sexual Activity    Alcohol use:  Yes     Alcohol/week: 3 0 standard drinks     Types: 3 Cans of beer per week    Drug use: Never    Sexual activity: None   Other Topics Concern    None   Social History Narrative    None     Social Determinants of Health     Financial Resource Strain:     Difficulty of Paying Living Expenses:    Food Insecurity:     Worried About Running Out of Food in the Last Year:     [address] in the Last Year:    Transportation Needs:     Lack of Transportation (Medical):  Lack of Transportation (Non-Medical):    Physical Activity:     Days of Exercise per Week:     Minutes of Exercise per Session:    Stress:     Feeling of Stress :    Social Connections:     Frequency of Communication with Friends and Family:     Frequency of Social Gatherings with Friends and Family:     Attends Gn[employer] Services:     Active Member of Clubs or Organizations:     Attends Club or Organization Meetings:     Marital Status:    Intimate Partner Violence:     Fear of Current or Ex-Partner:     Emotionally Abused:     Physically Abused:     Sexually Abused:         Allergies:   No Known Allergies    Labs:  0   Lab Value Date/Time    HCT 47 5 08/03/2021 0731    HCT 49 1 05/26/2020 0907    HCT 46 2 09/30/2019 0829    HCT 50 9 (H) 04/30/2018 0803    HCT 53 5 (H) 10/13/2016 0701    HGB 15 7 08/03/2021 0731    HGB 16 4 05/26/2020 0907    HGB 15 4 09/30/2019 0829    HGB 17 0 04/30/2018 0803    HGB 17 5 (H) 10/13/2016 0701    WBC 6 45 08/03/2021 0731    WBC 6 3 05/26/2020 0907    WBC 6 3 09/30/2019 0829    WBC 6 4 04/30/2018 0803    WBC 6 7 10/13/2016 0701       Meds:    Current Outpatient Medications:     aspirin 81 mg chewable tablet, Chew 1 tablet daily, Disp: , Rfl:     bisoprolol-hydrochlorothiazide (ZIAC) 5-6 25 MG per tablet, Take 1 tablet by mouth daily, Disp: 90 tablet, Rfl: 3    citalopram (CeleXA) 10 mg tablet, Take 1 tablet (10 mg total) by mouth daily, Disp: 90 tablet, Rfl: 3    fenofibrate (TRICOR) 145 mg tablet, TAKE 1 TABLET (145 MG TOTAL) BY MOUTH DAILY, Disp: 90 tablet, Rfl: 3    glimepiride (AMARYL) 4 mg tablet, TAKE 1 TABLET EVERY DAY, Disp: 90 tablet, Rfl: 3    glucose blood (ACCU-CHEK GUIDE) test strip, Use to test blood sugars twice daily as instructed, Disp: 100 each, Rfl: 1    Lancets MISC, by Does not apply route 2 (two) times a day, Disp: , Rfl:     losartan (COZAAR) 50 mg tablet, Take 1 tablet (50 mg total) by mouth daily, Disp: 90 tablet, Rfl: 3    metFORMIN (GLUCOPHAGE-XR) 500 mg 24 hr tablet, TAKE 1 TABLET TWICE DAILY, Disp: 180 tablet, Rfl: 3    cholecalciferol (VITAMIN D3) 1,000 units tablet, Take 1 tablet (1,000 Units total) by mouth daily (Patient not taking: Reported on 5/6/2021), Disp:  , Rfl:     sitaGLIPtin (JANUVIA) 100 mg tablet, Take 1 tablet (100 mg total) by mouth daily (Patient not taking: Reported on 7/26/2021), Disp: 30 tablet, Rfl: 4      Physical Exam:     General Appearance:    Alert, cooperative, no distress, appears stated age   Head:    Normocephalic, without obvious abnormality, atraumatic   Eyes:    conjunctiva/corneas clear, both eyes        Nose:   Nares normal, septum midline, no drainage    Throat:   Lips normal; teeth and gums normal   Neck:    symmetrical, trachea midline, ;     thyroid:  no enlargement/   Back:     Symmetric, no curvature, ROM normal   Lungs:   No audible wheezing or labored breathing   Chest Wall:    No tenderness or deformity    Heart:    Regular rate and rhythm               Pulses:   2+ and symmetric all extremities   Skin:   Skin color, texture, turgor normal, no rashes or lesions   Neurologic:   normal strength, sensation and reflexes     throughout       Musculoskeletal: right lower extremity   On examination of the right knee there is no effusion, no erythema  Range of motion to full active extension and flexion to greater than 120°  Pain on palpation medial and lateral joint lines  There is crepitus with range of motion, no warmth to palpation, bony enlargement noted  No pain on palpation pes anserine bursa region or distal iliotibial band  Stable to varus and valgus stress without pain or gapping  Negative anterior and posterior drawer testing  Sensation intact distal pulses present    Large joint arthrocentesis: R knee  Universal Protocol:  Consent given by: patient  Patient understanding: patient states understanding of the procedure being performed  Site marked: the operative site was marked  Patient identity confirmed: verbally with patient    Supporting Documentation  Indications: pain   Procedure Details  Location: knee - R knee  Needle size: 22 G  Approach: anterolateral  Medications administered: 2 mL bupivacaine 0 25 %; 2 mL methylPREDNISolone acetate 40 mg/mL    Patient tolerance: patient tolerated the procedure well with no immediate complications            _*_*_*_*_*_*_*_*_*_*_*_*_*_*_*_*_*_*_*_*_*_*_*_*_*_*_*_*_*_*_*_*_*_*_*_*_*_*_*_*_*    Assessment:  71 y o male with right knee   Chronic pain due to osteoarthritis    Plan:   · Weight bearing as tolerated  right lower extremity  ·  right knee intra-articular corticosteroid injection given as noted above  ·  Patient advised should they develop any increasing pain, redness, drainage, numbness, tingling or swelling surrounding the injection sight, they are to contact our office or present to the emergency department    ·  patient wished to pursue conservative management of right knee pain due to osteoarthritis   · Patient to continue home range of motion stretching strengthening exercises  ·  case discussed with Dr Bj Santana  · Follow up in 3 months or sooner if needed      Davey Davis PA-C

## 2021-08-13 ENCOUNTER — OFFICE VISIT (OUTPATIENT)
Dept: INTERNAL MEDICINE CLINIC | Facility: CLINIC | Age: 71
End: 2021-08-13
Payer: MEDICARE

## 2021-08-13 VITALS
SYSTOLIC BLOOD PRESSURE: 134 MMHG | HEIGHT: 71 IN | WEIGHT: 206 LBS | OXYGEN SATURATION: 98 % | HEART RATE: 69 BPM | DIASTOLIC BLOOD PRESSURE: 70 MMHG | BODY MASS INDEX: 28.84 KG/M2 | TEMPERATURE: 98.6 F

## 2021-08-13 DIAGNOSIS — I10 BENIGN ESSENTIAL HYPERTENSION: Primary | ICD-10-CM

## 2021-08-13 DIAGNOSIS — E66.3 OVERWEIGHT: ICD-10-CM

## 2021-08-13 DIAGNOSIS — Z00.00 HEALTHCARE MAINTENANCE: ICD-10-CM

## 2021-08-13 DIAGNOSIS — E11.65 TYPE 2 DIABETES MELLITUS WITH HYPERGLYCEMIA, WITHOUT LONG-TERM CURRENT USE OF INSULIN (HCC): ICD-10-CM

## 2021-08-13 DIAGNOSIS — Z12.5 PROSTATE CANCER SCREENING: ICD-10-CM

## 2021-08-13 PROCEDURE — G0439 PPPS, SUBSEQ VISIT: HCPCS | Performed by: INTERNAL MEDICINE

## 2021-08-13 PROCEDURE — 99214 OFFICE O/P EST MOD 30 MIN: CPT | Performed by: INTERNAL MEDICINE

## 2021-08-13 PROCEDURE — 1123F ACP DISCUSS/DSCN MKR DOCD: CPT | Performed by: INTERNAL MEDICINE

## 2021-08-13 NOTE — ASSESSMENT & PLAN NOTE
Blood pressure is controlled  Patient will continue present medication and surveillance  We will continue also to monitor his renal function which has been stable  He will continue present medication he remains on an ACE-inhibitor

## 2021-08-13 NOTE — PROGRESS NOTES
Assessment/Plan:    Type 2 diabetes mellitus with hyperglycemia, without long-term current use of insulin ([facility])    Lab Results   Component Value Date    HGBA1C 6 5 (H) 08/03/2021     Patient relates that he has been working very hard on his diet and exercise program   As noted patient's blood sugar showing excellent control  Patient will continue with present surveillance  We will check a fasting blood sugar hemoglobin A1c when he returns to the office in 4 months for follow-up  He was commended on his endeavors and will continue present medication  He is up-to-date with diabetic eye exam, diabetic foot exam    Overweight   With the patient's BMI he is still considered overweight  Again he is working extremely hard on his diet and exercise program in order to help with weight loss not only to control sugars but to reduce his weight overall  Patient is commended on his endeavors and patient will continue present program and hopefully we will continue to see improvement  Benign essential hypertension    Blood pressure is controlled  Patient will continue present medication and surveillance  We will continue also to monitor his renal function which has been stable  He will continue present medication he remains on an ACE-inhibitor  Healthcare maintenance    Patient is here today for repeat Medicare wellness visit  Did have extensive lab testing performed prior to the visit today and we did discuss the results of length with the patient  Patient states he is very proud of himself with his diet and exercise program in the he has improvement with his blood sugars  He states that he still taking care of his wife who has severe medical problems and does need chronic attention  He did undergo full physical exam today in the office showing no significant abnormalities from previously  Patient at this point time will continue present medication and surveillance    He will be returning to the office in 4 months for re-evaluation and will have labs performed prior to the visit  Diagnoses and all orders for this visit:    Benign essential hypertension  -     Basic metabolic panel; Future    Type 2 diabetes mellitus with hyperglycemia, without long-term current use of insulin ([facility])  -     Basic metabolic panel; Future  -     Hemoglobin A1C; Future    Healthcare maintenance    Prostate cancer screening  -     PSA, Total Screen; Future    Overweight          Subjective:      Patient ID: Artur Borja  is a 80 y o  male  Patient is a 73-year-old male with history of hypertension, diabetes mellitus type 2, overweight, DJD  Patient is here today for repeat Medicare wellness exam   He he did have fasting blood sugar and basic metabolic profile performed prior to the visit today and we did discuss the results with the patient  Patient states he is doing relatively well he is continuing to work harder on his diet and exercise program in order to help with weight loss  He denies any chest pain or pressure no increasing shortness of breath with exertion  States his appetite is good no bowel or bladder difficulties  No headaches lightheadedness or dizziness  The following portions of the patient's history were reviewed and updated as appropriate:   He  has a past medical history of DM (diabetes mellitus), type 2 ([facility] [address] )  He   Patient Active Problem List    Diagnosis Date Noted    Essential tremor 11/13/2020    Vitamin D deficiency 10/21/2020    Overweight 02/10/2020    Acute pain of right knee 10/08/2018    Healthcare maintenance 05/22/2018    Acute sinusitis 12/29/2017    Type 2 diabetes mellitus with hyperglycemia, without long-term current use of insulin ([facility] ) 08/04/2014    Anxiety 09/20/2012    Benign essential hypertension 09/20/2012    Hyperlipidemia 09/20/2012     He  has a past surgical history that includes Knee surgery (Right)    His family history includes Diabetes unspecified in his mother  He  reports that he has never smoked  He has never used smokeless tobacco  He reports current alcohol use of about 3 0 standard drinks of alcohol per week  He reports that he does not use drugs  Current Outpatient Medications   Medication Sig Dispense Refill    aspirin 81 mg chewable tablet Chew 1 tablet daily      bisoprolol-hydrochlorothiazide (ZIAC) 5-6 25 MG per tablet Take 1 tablet by mouth daily 90 tablet 3    citalopram (CeleXA) 10 mg tablet Take 1 tablet (10 mg total) by mouth daily 90 tablet 3    fenofibrate (TRICOR) 145 mg tablet TAKE 1 TABLET (145 MG TOTAL) BY MOUTH DAILY 90 tablet 3    glimepiride (AMARYL) 4 mg tablet TAKE 1 TABLET EVERY DAY 90 tablet 3    glucose blood (ACCU-CHEK GUIDE) test strip Use to test blood sugars twice daily as instructed 100 each 1    Lancets MISC by Does not apply route 2 (two) times a day      losartan (COZAAR) 50 mg tablet Take 1 tablet (50 mg total) by mouth daily 90 tablet 3    metFORMIN (GLUCOPHAGE-XR) 500 mg 24 hr tablet TAKE 1 TABLET TWICE DAILY 180 tablet 3    cholecalciferol (VITAMIN D3) 1,000 units tablet Take 1 tablet (1,000 Units total) by mouth daily (Patient not taking: Reported on 5/6/2021)      sitaGLIPtin (JANUVIA) 100 mg tablet Take 1 tablet (100 mg total) by mouth daily (Patient not taking: Reported on 7/26/2021) 30 tablet 4     No current facility-administered medications for this visit       Current Outpatient Medications on File Prior to Visit   Medication Sig    aspirin 81 mg chewable tablet Chew 1 tablet daily    bisoprolol-hydrochlorothiazide (ZIAC) 5-6 25 MG per tablet Take 1 tablet by mouth daily    citalopram (CeleXA) 10 mg tablet Take 1 tablet (10 mg total) by mouth daily    fenofibrate (TRICOR) 145 mg tablet TAKE 1 TABLET (145 MG TOTAL) BY MOUTH DAILY    glimepiride (AMARYL) 4 mg tablet TAKE 1 TABLET EVERY DAY    glucose blood (ACCU-CHEK GUIDE) test strip Use to test blood sugars twice daily as instructed    Lancets MISC by Does not apply route 2 (two) times a day    losartan (COZAAR) 50 mg tablet Take 1 tablet (50 mg total) by mouth daily    metFORMIN (GLUCOPHAGE-XR) 500 mg 24 hr tablet TAKE 1 TABLET TWICE DAILY    cholecalciferol (VITAMIN D3) 1,000 units tablet Take 1 tablet (1,000 Units total) by mouth daily (Patient not taking: Reported on 5/6/2021)    sitaGLIPtin (JANUVIA) 100 mg tablet Take 1 tablet (100 mg total) by mouth daily (Patient not taking: Reported on 7/26/2021)    [DISCONTINUED] carbidopa-levodopa (SINEMET)  mg per tablet Take 1 tablet by mouth 2 (two) times a day (Patient taking differently: Take 1 tablet by mouth daily )     No current facility-administered medications on file prior to visit  He has No Known Allergies       Review of Systems   Constitutional: Negative  HENT: Negative  Eyes: Negative  Respiratory: Negative  Cardiovascular: Negative  Gastrointestinal: Negative  Endocrine: Negative  Genitourinary: Negative  Musculoskeletal: Negative  Skin: Negative  Allergic/Immunologic: Negative  Neurological: Negative  Hematological: Negative  Psychiatric/Behavioral: Negative  Objective:      /70   Pulse 69   Temp 98 6 °F (37 °C)   Ht 5' 11" (1 803 m)   Wt 93 4 kg (206 lb)   SpO2 98%   BMI 28 73 kg/m²          Physical Exam  Vitals and nursing note reviewed  Constitutional:       General: He is not in acute distress  Appearance: Normal appearance  He is not ill-appearing, toxic-appearing or diaphoretic  Comments: Pleasant, overweight 71-year-old male who is awake alert no acute distress and oriented x3   HENT:      Head: Normocephalic and atraumatic  Right Ear: Tympanic membrane, ear canal and external ear normal  There is no impacted cerumen  Left Ear: Tympanic membrane, ear canal and external ear normal  There is no impacted cerumen  Nose: Nose normal  No congestion or rhinorrhea        Mouth/Throat: Mouth: Mucous membranes are moist       Pharynx: Oropharynx is clear  No oropharyngeal exudate or posterior oropharyngeal erythema  Comments: Thick, tongue  Eyes:      General: No scleral icterus  Right eye: No discharge  Left eye: No discharge  Extraocular Movements: Extraocular movements intact  Conjunctiva/sclera: Conjunctivae normal       Pupils: Pupils are equal, round, and reactive to light  Neck:      Vascular: No carotid bruit  Cardiovascular:      Rate and Rhythm: Normal rate and regular rhythm  Pulses: Normal pulses  Heart sounds: No murmur heard  No friction rub  No gallop  Pulmonary:      Effort: Pulmonary effort is normal  No respiratory distress  Breath sounds: Normal breath sounds  No stridor  No wheezing, rhonchi or rales  Chest:      Chest wall: No tenderness  Abdominal:      General: Bowel sounds are normal  There is no distension  Palpations: Abdomen is soft  There is no mass  Tenderness: There is no abdominal tenderness  There is no right CVA tenderness, left CVA tenderness, guarding or rebound  Hernia: No hernia is present  Comments: Overweight   Genitourinary:     Penis: Normal        Testes: Normal       Prostate: Normal    Musculoskeletal:         General: No swelling, tenderness, deformity or signs of injury  Normal range of motion  Cervical back: Normal range of motion and neck supple  No rigidity or tenderness  Right lower leg: No edema  Left lower leg: No edema  Lymphadenopathy:      Cervical: No cervical adenopathy  Skin:     General: Skin is warm and dry  Coloration: Skin is not jaundiced or pale  Findings: No bruising, erythema, lesion or rash  Neurological:      General: No focal deficit present  Mental Status: He is alert  Mental status is at baseline  Cranial Nerves: No cranial nerve deficit  Sensory: No sensory deficit  Motor: No weakness  Coordination: Coordination normal       Gait: Gait normal       Deep Tendon Reflexes: Reflexes normal    Psychiatric:         Mood and Affect: Mood normal          Behavior: Behavior normal          Thought Content: Thought content normal          Judgment: Judgment normal          BMI Counseling: Body mass index is 28 73 kg/m²  The BMI is above normal  Nutrition recommendations include reducing portion sizes, decreasing overall calorie intake, 3-5 servings of fruits/vegetables daily, consuming healthier snacks, moderation in carbohydrate intake, increasing intake of lean protein, reducing intake of saturated fat and trans fat and reducing intake of cholesterol  Exercise recommendations include exercising 3-5 times per week

## 2021-08-13 NOTE — PATIENT INSTRUCTIONS
Heart Healthy Diet   AMBULATORY CARE:   A heart healthy diet  is an eating plan low in unhealthy fats and sodium (salt)  The plan is high in healthy fats and fiber  A heart healthy diet helps improve your cholesterol levels and lowers your risk for heart disease and stroke  A dietitian will teach you how to read and understand food labels  Heart healthy diet guidelines to follow:   · Choose foods that contain healthy fats  ? Unsaturated fats  include monounsaturated and polyunsaturated fats  Unsaturated fat is found in foods such as soybean, canola, olive, corn, and safflower oils  It is also found in soft tub margarine that is made with liquid vegetable oil  ? Omega-3 fat  is found in certain fish, such as salmon, tuna, and trout, and in walnuts and flaxseed  Eat fish high in omega-3 fats at least 2 times a week  · Get 20 to 30 grams of fiber each day  Fruits, vegetables, whole-grain foods, and legumes (cooked beans) are good sources of fiber  · Limit or do not have unhealthy fats  ? Cholesterol  is found in animal foods, such as eggs and lobster, and in dairy products made from whole milk  Limit cholesterol to less than 200 mg each day  ? Saturated fat  is found in meats, such as bryant and hamburger  It is also found in chicken or turkey skin, whole milk, and butter  Limit saturated fat to less than 7% of your total daily calories  ? Trans fat  is found in packaged foods, such as potato chips and cookies  It is also in hard margarine, some fried foods, and shortening  Do not eat foods that contain trans fats  · Limit sodium as directed  You may be told to limit sodium to 2,000 to 2,300 mg each day  Choose low-sodium or no-salt-added foods  Add little or no salt to food you prepare  Use herbs and spices in place of salt         Include the following in your heart healthy plan:  Ask your dietitian or healthcare provider how many servings to have from each of the following food groups:  · Grains:      ? Whole-wheat breads, cereals, and pastas, and brown rice    ? Low-fat, low-sodium crackers and chips    · Vegetables:      ? Broccoli, green beans, green peas, and spinach    ? Collards, kale, and lima beans    ? Carrots, sweet potatoes, tomatoes, and peppers    ? Canned vegetables with no salt added    · Fruits:      ? Bananas, peaches, pears, and pineapple    ? Grapes, raisins, and dates    ? Oranges, tangerines, grapefruit, orange juice, and grapefruit juice    ? Apricots, mangoes, melons, and papaya    ? Raspberries and strawberries    ? Canned fruit with no added sugar    · Low-fat dairy:      ? Nonfat (skim) milk, 1% milk, and low-fat almond, cashew, or soy milks fortified with calcium    ? Low-fat cheese, regular or frozen yogurt, and cottage cheese    · Meats and proteins:      ? Lean cuts of beef and pork (loin, leg, round), skinless chicken and turkey    ? Legumes, soy products, egg whites, or nuts    Limit or do not include the following in your heart healthy plan:   · Unhealthy fats and oils:      ? Whole or 2% milk, cream cheese, sour cream, or cheese    ? High-fat cuts of beef (T-bone steaks, ribs), chicken or turkey with skin, and organ meats such as liver    ? Butter, stick margarine, shortening, and cooking oils such as coconut or palm oil    · Foods and liquids high in sodium:      ? Packaged foods, such as frozen dinners, cookies, macaroni and cheese, and cereals with more than 300 mg of sodium per serving    ? Vegetables with added sodium, such as instant potatoes, vegetables with added sauces, or regular canned vegetables    ? Cured or smoked meats, such as hot dogs, bryant, and sausage    ? High-sodium ketchup, barbecue sauce, salad dressing, pickles, olives, soy sauce, or miso    · Foods and liquids high in sugar:      ? Candy, cake, cookies, pies, or doughnuts    ? Soft drinks (soda), sports drinks, or sweetened tea    ?  Canned or dry mixes for cakes, soups, sauces, or gravies    Other healthy heart guidelines:   · Do not smoke  Nicotine and other chemicals in cigarettes and cigars can cause lung and heart damage  Ask your healthcare provider for information if you currently smoke and need help to quit  E-cigarettes or smokeless tobacco still contain nicotine  Talk to your healthcare provider before you use these products  · Limit or do not drink alcohol as directed  Alcohol can damage your heart and raise your blood pressure  Your healthcare provider may give you specific daily and weekly limits  The general recommended limit is 1 drink a day for women 21 or older and for men 72 or older  Do not have more than 3 drinks in a day or 7 in a week  The recommended limit is 2 drinks a day for men 24to 59years of age  Do not have more than 4 drinks in a day or 14 in a week  A drink of alcohol is 12 ounces of beer, 5 ounces of wine, or 1½ ounces of liquor  · Exercise regularly  Exercise can help you maintain a healthy weight and improve your blood pressure and cholesterol levels  Regular exercise can also decrease your risk for heart problems  Ask your healthcare provider about the best exercise plan for you  Do not start an exercise program without asking your healthcare provider  Follow up with your doctor or cardiologist as directed:  Write down your questions so you remember to ask them during your visits  © [employer] 2021 Information is for End User's use only and may not be sold, redistributed or otherwise used for commercial purposes  All illustrations and images included in CareNotes® are the copyrighted property of A D A M , Inc  or Antonette Corcoran   The above information is an  only  It is not intended as medical advice for individual conditions or treatments  Talk to your doctor, nurse or pharmacist before following any medical regimen to see if it is safe and effective for you      Calorie Counting Diet   WHAT YOU NEED TO KNOW:   What is a calorie counting diet? It is a meal plan based on counting calories each day to reach a healthy body weight  You will need to eat fewer calories if you are trying to lose weight  Weight loss may decrease your risk for certain health problems or improve your health if you have health problems  Some of these health problems include heart disease, high blood pressure, and diabetes  What foods should I avoid? Your dietitian will tell you if you need to avoid certain foods based on your body weight and health condition  You may need to avoid high-fat foods if you are at risk for or have heart disease  You may need to eat fewer foods from the breads and starches food group if you have diabetes  How many calories are in foods? The following is a list of foods and drinks with the approximate number of calories in each  Check the food label to find the exact number of calories  A dietitian can tell you how many calories you should have from each food group each day  · Carbohydrate:      ? ½ of a 3-inch bagel, 1 slice of bread, or ½ of a hamburger bun or hot dog bun (80)    ? 1 (8-inch) flour tortilla or ½ cup of cooked rice (100)    ? 1 (6-inch) corn tortilla (80)    ? 1 (6-inch) pancake or 1 cup of bran flakes cereal (110)    ? ½ cup of cooked cereal (80)    ? ½ cup of cooked pasta (85)    ? 1 ounce of pretzels (100)    ? 3 cups of air-popped popcorn without butter or oil (80)    · Dairy:      ? 1 cup of skim or 1% milk (90)    ? 1 cup of 2% milk (120)    ? 1 cup of whole milk (160)    ? 1 cup of 2% chocolate milk (220)    ? 1 ounce of low-fat cheese with 3 grams of fat per ounce (70)    ? 1 ounce of cheddar cheese (114)    ? ½ cup of 1% fat cottage cheese (80)    ? 1 cup of plain or sugar-free, fat-free yogurt (90)    · Protein foods:      ? 3 ounces of fish (not breaded or fried) (95)    ? 3 ounces of breaded, fried fish (195)    ? ¾ cup of tuna canned in water (105)    ?  3 ounces of chicken breast without skin (105)    ? 1 fried chicken breast with skin (350)    ? ¼ cup of fat free egg substitute (40)    ? 1 large egg (75)    ? 3 ounces of lean beef or pork (165)    ? 3 ounces of fried pork chop or ham (185)    ? ½ cup of cooked dried beans, such as kidney, barney, lentils, or navy (115)    ? 3 ounces of bologna or lunch meat (225)    ? 2 links of breakfast sausage (140)    · Vegetables:      ? ½ cup of sliced mushrooms (10)    ? 1 cup of salad greens, such as lettuce, spinach, or gila (15)    ? ½ cup of steamed asparagus (20)    ? ½ cup of cooked summer squash, zucchini squash, or green or wax beans (25)    ? 1 cup of broccoli or cauliflower florets, or 1 medium tomato (25)    ? 1 large raw carrot or ½ cup of cooked carrots (40)    ? ? of a medium cucumber or 1 stalk of celery (5)    ? 1 small baked potato (160)    ? 1 cup of breaded, fried vegetables (230)    · Fruit:      ? 1 (6-inch) banana (55)     ? ½ of a 4-inch grapefruit (55)    ? 15 grapes (60)    ? 1 medium orange or apple (70)    ? 1 large peach (65)    ? 1 cup of fresh pineapple chunks (75)    ? 1 cup of melon cubes (50)    ? 1¼ cups of whole strawberries (45)    ? ½ cup of fruit canned in juice (55)    ? ½ cup of fruit canned in heavy syrup (110)    ? ? cup of raisins (130)    ? ½ cup of unsweetened fruit juice (60)    ? ½ cup of grape, cranberry, or prune juice (90)    · Fat:      ? 10 peanuts or 2 teaspoons of peanut butter (55)    ? 2 tablespoons of avocado or 1 tablespoon of regular salad dressing (45)    ? 2 slices of bryant (90)    ? 1 teaspoon of oil, such as safflower, canola, corn, or olive oil (45)    ? 2 teaspoons of low-fat margarine, or 1 tablespoon of low-fat mayonnaise (50)    ? 1 teaspoon of regular margarine (40)    ? 1 tablespoon of regular mayonnaise (135)    ? 1 tablespoon of cream cheese or 2 tablespoons of low-fat cream cheese (45)    ?  2 tablespoons of vegetable shortening (215)    · Dessert and sweets:      ? 8 animal crackers or 5 vanilla wafers (80)    ? 1 frozen fruit juice bar (80)    ? ½ cup of ice milk or low-fat frozen yogurt (90)    ? ½ cup of sherbet or sorbet (125)    ? ½ cup of sugar-free pudding or custard (60)    ? ½ cup of ice cream (140)    ? ½ cup of pudding or custard (175)    ? 1 (2-inch) square chocolate brownie (185)    · Combination foods:      ? Bean burrito made with an 8-inch tortilla, without cheese (275)    ? Chicken breast sandwich with lettuce and tomato (325)    ? 1 cup of chicken noodle soup (60)    ? 1 beef taco (175)    ? Regular hamburger with lettuce and tomato (310)    ? Regular cheeseburger with lettuce and tomato (410)     ? ¼ of a 12-inch cheese pizza (280)    ? Fried fish sandwich with lettuce and tomato (425)    ? Hot dog and bun (275)    ? 1½ cups of macaroni and cheese (310)    ? Taco salad with a fried tortilla shell (870)    · Low-calorie foods:      ? 1 tablespoon of ketchup or 1 tablespoon of fat free sour cream (15)    ? 1 teaspoon of mustard (5)    ? ¼ cup of salsa (20)    ? 1 large dill pickle (15)    ? 1 tablespoon of fat free salad dressing (10)    ? 2 teaspoons of low-sugar, light jam or jelly, or 1 tablespoon of sugar-free syrup (15)    ? 1 sugar-free popsicle (15)    ? 1 cup of club soda, seltzer water, or diet soda (0)    CARE AGREEMENT:   You have the right to help plan your care  Discuss treatment options with your healthcare provider to decide what care you want to receive  You always have the right to refuse treatment  The above information is an  only  It is not intended as medical advice for individual conditions or treatments  Talk to your doctor, nurse or pharmacist before following any medical regimen to see if it is safe and effective for you  © Copyright [employer] 2021 Information is for End User's use only and may not be sold, redistributed or otherwise used for commercial purposes   All illustrations and images included in [facility] are the copyrighted property of A D A M , Inc  or [address] [place], [place] [address] [address]

## 2021-08-13 NOTE — ASSESSMENT & PLAN NOTE
Lab Results   Component Value Date    HGBA1C 6 5 (H) 08/03/2021     Patient relates that he has been working very hard on his diet and exercise program   As noted patient's blood sugar showing excellent control  Patient will continue with present surveillance  We will check a fasting blood sugar hemoglobin A1c when he returns to the office in 4 months for follow-up  He was commended on his endeavors and will continue present medication    He is up-to-date with diabetic eye exam, diabetic foot exam

## 2021-08-13 NOTE — PROGRESS NOTES
Assessment and Plan:     Problem List Items Addressed This Visit     None           Preventive health issues were discussed with patient, and age appropriate screening tests were ordered as noted in patient's After Visit Summary  Personalized health advice and appropriate referrals for health education or preventive services given if needed, as noted in patient's After Visit Summary  History of Present Illness:     Patient presents for Medicare Annual Wellness visit    Patient Care Team:  Kamlesh Grove DO as PCP - Rachel Wilkinson MD as PCP - Endocrinology (Endocrinology)  Nury Ríos MD     Problem List:     Patient Active Problem List   Diagnosis    Anxiety    Benign essential hypertension    Type 2 diabetes mellitus with hyperglycemia, without long-term current use of insulin ([facility] )    Hyperlipidemia    Healthcare maintenance    Acute pain of right knee    Acute sinusitis    Overweight    Vitamin D deficiency    Essential tremor      Past Medical and Surgical History:     Past Medical History:   Diagnosis Date    DM (diabetes mellitus), type 2 ([facility] )      Past Surgical History:   Procedure Laterality Date    KNEE SURGERY Right       Family History:     Family History   Problem Relation Age of Onset    Diabetes unspecified Mother       Social History:     Social History     Socioeconomic History    Marital status: /Civil Union     Spouse name: None    Number of children: None    Years of education: None    Highest education level: None   Occupational History    None   Tobacco Use    Smoking status: Never Smoker    Smokeless tobacco: Never Used   Vaping Use    Vaping Use: Never used   Substance and Sexual Activity    Alcohol use:  Yes     Alcohol/week: 3 0 standard drinks     Types: 3 Cans of beer per week    Drug use: Never    Sexual activity: None   Other Topics Concern    None   Social History Narrative    None     Social Determinants of Health Financial Resource Strain:     Difficulty of Paying Living Expenses:    Food Insecurity:     Worried About Running Out of Food in the Last Year:     [address] in the Last Year:    Transportation Needs:     Lack of Transportation (Medical):      Lack of Transportation (Non-Medical):    Physical Activity:     Days of Exercise per Week:     Minutes of Exercise per Session:    Stress:     Feeling of Stress :    Social Connections:     Frequency of Communication with Friends and Family:     Frequency of Social Gatherings with Friends and Family:     Attends [employer] Services:     Active Member of Clubs or Organizations:     Attends Club or Organization Meetings:     Marital Status:    Intimate Partner Violence:     Fear of Current or Ex-Partner:     Emotionally Abused:     Physically Abused:     Sexually Abused:       Medications and Allergies:     Current Outpatient Medications   Medication Sig Dispense Refill    aspirin 81 mg chewable tablet Chew 1 tablet daily      bisoprolol-hydrochlorothiazide (ZIAC) 5-6 25 MG per tablet Take 1 tablet by mouth daily 90 tablet 3    citalopram (CeleXA) 10 mg tablet Take 1 tablet (10 mg total) by mouth daily 90 tablet 3    fenofibrate (TRICOR) 145 mg tablet TAKE 1 TABLET (145 MG TOTAL) BY MOUTH DAILY 90 tablet 3    glimepiride (AMARYL) 4 mg tablet TAKE 1 TABLET EVERY DAY 90 tablet 3    glucose blood (ACCU-CHEK GUIDE) test strip Use to test blood sugars twice daily as instructed 100 each 1    Lancets MISC by Does not apply route 2 (two) times a day      losartan (COZAAR) 50 mg tablet Take 1 tablet (50 mg total) by mouth daily 90 tablet 3    metFORMIN (GLUCOPHAGE-XR) 500 mg 24 hr tablet TAKE 1 TABLET TWICE DAILY 180 tablet 3    cholecalciferol (VITAMIN D3) 1,000 units tablet Take 1 tablet (1,000 Units total) by mouth daily (Patient not taking: Reported on 5/6/2021)      sitaGLIPtin (JANUVIA) 100 mg tablet Take 1 tablet (100 mg total) by mouth daily (Patient not taking: Reported on 7/26/2021) 30 tablet 4     No current facility-administered medications for this visit  No Known Allergies   Immunizations:     Immunization History   Administered Date(s) Administered    Influenza Quadrivalent Preservative Free 3 years and older IM 09/29/2014    Influenza Quadrivalent, 6-35 Months IM 09/18/2015    Influenza Split High Dose Preservative Free IM 10/19/2016, 09/15/2017    Influenza, high dose seasonal 0 7 mL 10/07/2019, 11/13/2020    Influenza, injectable, quadrivalent, preservative free 0 5 mL 09/29/2018    Influenza, seasonal, injectable 09/20/2012, 09/24/2013    Pneumococcal Conjugate 13-Valent 10/19/2016    SARS-CoV-2 / COVID-19 mRNA IM (Moderna) 03/19/2021      Health Maintenance:         Topic Date Due    Hepatitis C Screening  Never done    Colorectal Cancer Screening  02/10/2021         Topic Date Due    DTaP,Tdap,and Td Vaccines (1 - Tdap) Never done    Pneumococcal Vaccine: 65+ Years (1 of 2 - PPSV23) 12/14/2016    COVID-19 Vaccine (2 - Moderna 2-dose series) 04/16/2021    Influenza Vaccine (1) 09/01/2021      Medicare Health Risk Assessment:     /76   Pulse 69   Temp 98 6 °F (37 °C)   Ht 5' 11" (1 803 m)   Wt 93 4 kg (206 lb)   SpO2 98%   BMI 28 73 kg/m²      Paulo is here for his Subsequent Wellness visit  Health Risk Assessment:   Patient rates overall health as very good  Patient feels that their physical health rating is slightly better  Patient is very satisfied with their life  Eyesight was rated as same  Hearing was rated as same  Patient feels that their emotional and mental health rating is slightly better  Patients states they are never, rarely angry  Patient states they are never, rarely unusually tired/fatigued  Pain experienced in the last 7 days has been none  Patient states that he has experienced no weight loss or gain in last 6 months       Depression Screening:   PHQ-2 Score: 0      Fall Risk Screening: In the past year, patient has experienced: no history of falling in past year      Home Safety:  Patient does not have trouble with stairs inside or outside of their home  Patient has working smoke alarms and has no working carbon monoxide detector  Home safety hazards include: none  Nutrition:   Current diet is Regular  Medications:   Patient is not currently taking any over-the-counter supplements  Patient is able to manage medications  Activities of Daily Living (ADLs)/Instrumental Activities of Daily Living (IADLs):   Walk and transfer into and out of bed and chair?: Yes  Dress and groom yourself?: Yes    Bathe or shower yourself?: Yes    Feed yourself? Yes  Do your laundry/housekeeping?: Yes  Manage your money, pay your bills and track your expenses?: Yes  Make your own meals?: Yes    Do your own shopping?: Yes    Previous Hospitalizations:   Any hospitalizations or ED visits within the last 12 months?: No      Advance Care Planning:   Living will: Yes    Durable POA for healthcare: Yes    Advanced directive: Yes      PREVENTIVE SCREENINGS      Cardiovascular Screening:    General: Screening Not Indicated and History Lipid Disorder      Diabetes Screening:     General: Screening Not Indicated and History Diabetes      Abdominal Aortic Aneurysm (AAA) Screening:    Risk factors include: age between 73-69 yo        Lung Cancer Screening:     General: Screening Not Indicated    Screening, Brief Intervention, and Referral to Treatment (SBIRT)    Screening  Typical number of drinks in a day: 0  Typical number of drinks in a week: 3  Interpretation: Low risk drinking behavior  AUDIT-C Screenin) How often did you have a drink containing alcohol in the past year? monthly or less  2) How many drinks did you have on a typical day when you were drinking in the past year?  3 to 4  3) How often did you have 6 or more drinks on one occasion in the past year? never    AUDIT-C Score: 1  Interpretation: Score 0-3 (male): Negative screen for alcohol misuse    Single Item Drug Screening:  How often have you used an illegal drug (including marijuana) or a prescription medication for non-medical reasons in the past year? never    Single Item Drug Screen Score: 0  Interpretation: Negative screen for possible drug use disorder      Steve Brunner, DO

## 2021-08-13 NOTE — ASSESSMENT & PLAN NOTE
With the patient's BMI he is still considered overweight  Again he is working extremely hard on his diet and exercise program in order to help with weight loss not only to control sugars but to reduce his weight overall  Patient is commended on his endeavors and patient will continue present program and hopefully we will continue to see improvement

## 2021-08-13 NOTE — ASSESSMENT & PLAN NOTE
Patient is here today for repeat Medicare wellness visit  Did have extensive lab testing performed prior to the visit today and we did discuss the results of length with the patient  Patient states he is very proud of himself with his diet and exercise program in the he has improvement with his blood sugars  He states that he still taking care of his wife who has severe medical problems and does need chronic attention  He did undergo full physical exam today in the office showing no significant abnormalities from previously  Patient at this point time will continue present medication and surveillance  He will be returning to the office in 4 months for re-evaluation and will have labs performed prior to the visit

## 2021-09-13 ENCOUNTER — OFFICE VISIT (OUTPATIENT)
Dept: INTERNAL MEDICINE CLINIC | Facility: CLINIC | Age: 71
End: 2021-09-13
Payer: MEDICARE

## 2021-09-13 VITALS
BODY MASS INDEX: 28.42 KG/M2 | WEIGHT: 203 LBS | TEMPERATURE: 97.7 F | HEART RATE: 59 BPM | OXYGEN SATURATION: 96 % | HEIGHT: 71 IN | SYSTOLIC BLOOD PRESSURE: 139 MMHG | DIASTOLIC BLOOD PRESSURE: 82 MMHG

## 2021-09-13 DIAGNOSIS — J40 BRONCHITIS: Primary | ICD-10-CM

## 2021-09-13 DIAGNOSIS — E11.65 TYPE 2 DIABETES MELLITUS WITH HYPERGLYCEMIA, WITHOUT LONG-TERM CURRENT USE OF INSULIN (HCC): ICD-10-CM

## 2021-09-13 DIAGNOSIS — I10 BENIGN ESSENTIAL HYPERTENSION: ICD-10-CM

## 2021-09-13 PROCEDURE — 99214 OFFICE O/P EST MOD 30 MIN: CPT | Performed by: INTERNAL MEDICINE

## 2021-09-13 RX ORDER — AZITHROMYCIN 250 MG/1
TABLET, FILM COATED ORAL
Qty: 6 TABLET | Refills: 0 | Status: SHIPPED | OUTPATIENT
Start: 2021-09-13 | End: 2021-09-17

## 2021-09-13 NOTE — PROGRESS NOTES
Assessment/Plan:    Bronchitis   Patient is here today for evaluation  States that starting Friday him night he began to have some problems with cough  His symptoms have progressed through the weekend now bringing up yellowish mucus  He denies any fever or chills no increasing shortness of breath  He states the cough is persistent and he continues as in the past taking Tussin DM to help with this  He has had severe lower episodes in the past as noted  He is up-to-date with COVID virus vaccine  On evaluation patient has only slight nasal congestion, slight erythema to posterior airway with a thick whitish postnasal drip  Lungs are clear to auscultation anteriorly and posteriorly  Patient states he has responded well in the past when his been given a Zithromax Z-Epifanio and this was sent to the pharmacy  He is urged to call if not improving    Benign essential hypertension   Patient does have a longstanding history of hypertension  He remains on bisoprolol hydrochlorothiazide along with losartan and his blood pressure is showing adequate control today with present medication  We will continue also to monitor his renal function especially in light of hypertension and diabetes    Type 2 diabetes mellitus with hyperglycemia, without long-term current use of insulin ([facility] )   With discussion with the patient today apparently he is continuing to watch his diet, limiting his caloric intake and trying to keep physically active  His weight is down a few more lb and he is commended for this  We will continue to monitor his fasting blood sugars and hemoglobin A1c which as noted with his  Lab Results   Component Value Date    HGBA1C 6 5 (H) 08/03/2021        Diagnoses and all orders for this visit:    Bronchitis  -     azithromycin (ZITHROMAX) 250 mg tablet;  Take 2 tablets today then 1 tablet daily x 4 days    Benign essential hypertension    Type 2 diabetes mellitus with hyperglycemia, without long-term current use of insulin (HCC)          Subjective:      Patient ID: Edwina Mitchell  is a 80 y o  male  The patient is a 46-year-old male history of hypertension, hyperlipidemia, diabetes mellitus type 2 who is here today for acute evaluation complaining of upper respiratory tract symptoms, cough producing a yellowish mucus  No fever or chills no significant shortness of breath  Has been taking over-the-counter Tussin DM for the cough with no significant improvement  He has been sick for approximately 4 days      The following portions of the patient's history were reviewed and updated as appropriate:   He  has a past medical history of DM (diabetes mellitus), type 2 ([facility] )  He   Patient Active Problem List    Diagnosis Date Noted    Bronchitis 09/13/2021    Essential tremor 11/13/2020    Vitamin D deficiency 10/21/2020    Overweight 02/10/2020    Acute pain of right knee 10/08/2018    Healthcare maintenance 05/22/2018    Acute sinusitis 12/29/2017    Type 2 diabetes mellitus with hyperglycemia, without long-term current use of insulin ([place] [address] ) 08/04/2014    Anxiety 09/20/2012    Benign essential hypertension 09/20/2012    Hyperlipidemia 09/20/2012     He  has a past surgical history that includes Knee surgery (Right)  His family history includes Diabetes unspecified in his mother  He  reports that he has never smoked  He has never used smokeless tobacco  He reports current alcohol use of about 3 0 standard drinks of alcohol per week  He reports that he does not use drugs    Current Outpatient Medications   Medication Sig Dispense Refill    aspirin 81 mg chewable tablet Chew 1 tablet daily      bisoprolol-hydrochlorothiazide (ZIAC) 5-6 25 MG per tablet Take 1 tablet by mouth daily 90 tablet 3    citalopram (CeleXA) 10 mg tablet Take 1 tablet (10 mg total) by mouth daily 90 tablet 3    fenofibrate (TRICOR) 145 mg tablet TAKE 1 TABLET (145 MG TOTAL) BY MOUTH DAILY 90 tablet 3    glimepiride (AMARYL) 4 mg tablet TAKE 1 TABLET EVERY DAY 90 tablet 3    glucose blood (ACCU-CHEK GUIDE) test strip Use to test blood sugars twice daily as instructed 100 each 1    Lancets MISC by Does not apply route 2 (two) times a day      losartan (COZAAR) 50 mg tablet Take 1 tablet (50 mg total) by mouth daily 90 tablet 3    metFORMIN (GLUCOPHAGE-XR) 500 mg 24 hr tablet TAKE 1 TABLET TWICE DAILY 180 tablet 3    azithromycin (ZITHROMAX) 250 mg tablet Take 2 tablets today then 1 tablet daily x 4 days 6 tablet 0    cholecalciferol (VITAMIN D3) 1,000 units tablet Take 1 tablet (1,000 Units total) by mouth daily (Patient not taking: Reported on 5/6/2021)      sitaGLIPtin (JANUVIA) 100 mg tablet Take 1 tablet (100 mg total) by mouth daily (Patient not taking: Reported on 7/26/2021) 30 tablet 4     No current facility-administered medications for this visit       Current Outpatient Medications on File Prior to Visit   Medication Sig    aspirin 81 mg chewable tablet Chew 1 tablet daily    bisoprolol-hydrochlorothiazide (ZIAC) 5-6 25 MG per tablet Take 1 tablet by mouth daily    citalopram (CeleXA) 10 mg tablet Take 1 tablet (10 mg total) by mouth daily    fenofibrate (TRICOR) 145 mg tablet TAKE 1 TABLET (145 MG TOTAL) BY MOUTH DAILY    glimepiride (AMARYL) 4 mg tablet TAKE 1 TABLET EVERY DAY    glucose blood (ACCU-CHEK GUIDE) test strip Use to test blood sugars twice daily as instructed    Lancets MISC by Does not apply route 2 (two) times a day    losartan (COZAAR) 50 mg tablet Take 1 tablet (50 mg total) by mouth daily    metFORMIN (GLUCOPHAGE-XR) 500 mg 24 hr tablet TAKE 1 TABLET TWICE DAILY    cholecalciferol (VITAMIN D3) 1,000 units tablet Take 1 tablet (1,000 Units total) by mouth daily (Patient not taking: Reported on 5/6/2021)    sitaGLIPtin (JANUVIA) 100 mg tablet Take 1 tablet (100 mg total) by mouth daily (Patient not taking: Reported on 7/26/2021)    [DISCONTINUED] carbidopa-levodopa (SINEMET)  mg per tablet Take 1 tablet by mouth 2 (two) times a day (Patient taking differently: Take 1 tablet by mouth daily )     No current facility-administered medications on file prior to visit  He has No Known Allergies       Review of Systems   Constitutional: Negative  HENT: Positive for congestion ( some mild nasalCongestion but not severe)  Negative for dental problem, drooling, ear discharge, ear pain, facial swelling, hearing loss, mouth sores, nosebleeds, postnasal drip, rhinorrhea, sinus pressure, sinus pain, sneezing, sore throat, tinnitus, trouble swallowing and voice change  Eyes: Negative  Respiratory: Positive for cough  Negative for apnea, choking, chest tightness, shortness of breath, wheezing and stridor  Cardiovascular: Negative  Gastrointestinal: Negative  Endocrine: Negative  Genitourinary: Negative  Musculoskeletal: Negative  Skin: Negative  Allergic/Immunologic: Negative  Neurological: Negative  Hematological: Negative  Psychiatric/Behavioral: Negative  Objective:      /82   Pulse 59   Temp 97 7 °F (36 5 °C)   Ht 5' 11" (1 803 m)   Wt 92 1 kg (203 lb)   SpO2 96%   BMI 28 31 kg/m²          Physical Exam  Vitals and nursing note reviewed  Constitutional:       General: He is not in acute distress  Appearance: Normal appearance  He is not ill-appearing, toxic-appearing or diaphoretic  Comments: Mildly congested-sounding 18-year-old male who is awake alert   HENT:      Head: Normocephalic and atraumatic  Right Ear: Tympanic membrane, ear canal and external ear normal  There is no impacted cerumen  Left Ear: Tympanic membrane, ear canal and external ear normal  There is no impacted cerumen  Nose:      Comments: Positive erythema to nasal mucosa which is minimal clear nasal discharge     Mouth/Throat:      Mouth: Mucous membranes are moist       Pharynx: Oropharyngeal exudate and posterior oropharyngeal erythema present  Comments: Very slight erythema to posterior airway with a whitish postnasal drip  Eyes:      General: No scleral icterus  Right eye: No discharge  Left eye: No discharge  Extraocular Movements: Extraocular movements intact  Conjunctiva/sclera: Conjunctivae normal       Pupils: Pupils are equal, round, and reactive to light  Neck:      Vascular: No carotid bruit  Cardiovascular:      Rate and Rhythm: Normal rate and regular rhythm  Pulses: Normal pulses  Heart sounds: Normal heart sounds  No murmur heard  No friction rub  No gallop  Pulmonary:      Effort: Pulmonary effort is normal  No respiratory distress  Breath sounds: Normal breath sounds  No stridor  No wheezing, rhonchi or rales  Chest:      Chest wall: No tenderness  Abdominal:      General: Bowel sounds are normal  There is no distension  Palpations: Abdomen is soft  There is no mass  Tenderness: There is no abdominal tenderness  There is no right CVA tenderness, left CVA tenderness, guarding or rebound  Hernia: No hernia is present  Musculoskeletal:         General: Normal range of motion  Cervical back: Normal range of motion and neck supple  No rigidity or tenderness  Lymphadenopathy:      Cervical: No cervical adenopathy  Skin:     General: Skin is warm and dry  Coloration: Skin is not jaundiced or pale  Findings: No bruising, erythema, lesion or rash  Neurological:      General: No focal deficit present  Mental Status: He is alert and oriented to person, place, and time  Mental status is at baseline  Psychiatric:         Mood and Affect: Mood normal          Behavior: Behavior normal          Thought Content:  Thought content normal          Judgment: Judgment normal

## 2021-09-13 NOTE — ASSESSMENT & PLAN NOTE
Patient is here today for evaluation  States that starting Friday him night he began to have some problems with cough  His symptoms have progressed through the weekend now bringing up yellowish mucus  He denies any fever or chills no increasing shortness of breath  He states the cough is persistent and he continues as in the past taking Tussin DM to help with this  He has had severe lower episodes in the past as noted  He is up-to-date with COVID virus vaccine  On evaluation patient has only slight nasal congestion, slight erythema to posterior airway with a thick whitish postnasal drip  Lungs are clear to auscultation anteriorly and posteriorly  Patient states he has responded well in the past when his been given a Zithromax Z-Epifanio and this was sent to the pharmacy    He is urged to call if not improving

## 2021-09-13 NOTE — ASSESSMENT & PLAN NOTE
With discussion with the patient today apparently he is continuing to watch his diet, limiting his caloric intake and trying to keep physically active  His weight is down a few more lb and he is commended for this    We will continue to monitor his fasting blood sugars and hemoglobin A1c which as noted with his  Lab Results   Component Value Date    HGBA1C 6 5 (H) 08/03/2021

## 2021-09-13 NOTE — ASSESSMENT & PLAN NOTE
Patient does have a longstanding history of hypertension  He remains on bisoprolol hydrochlorothiazide along with losartan and his blood pressure is showing adequate control today with present medication    We will continue also to monitor his renal function especially in light of hypertension and diabetes

## 2021-10-12 ENCOUNTER — TELEPHONE (OUTPATIENT)
Dept: GASTROENTEROLOGY | Facility: AMBULARY SURGERY CENTER | Age: 71
End: 2021-10-12

## 2021-10-13 ENCOUNTER — ANESTHESIA EVENT (OUTPATIENT)
Dept: GASTROENTEROLOGY | Facility: AMBULARY SURGERY CENTER | Age: 71
End: 2021-10-13

## 2021-10-13 ENCOUNTER — HOSPITAL ENCOUNTER (OUTPATIENT)
Dept: GASTROENTEROLOGY | Facility: AMBULARY SURGERY CENTER | Age: 71
Setting detail: OUTPATIENT SURGERY
Discharge: HOME | End: 2021-10-13
Attending: COLON & RECTAL SURGERY | Admitting: COLON & RECTAL SURGERY
Payer: MEDICARE

## 2021-10-13 ENCOUNTER — ANESTHESIA (OUTPATIENT)
Dept: GASTROENTEROLOGY | Facility: AMBULARY SURGERY CENTER | Age: 71
End: 2021-10-13

## 2021-10-13 VITALS
WEIGHT: 207 LBS | OXYGEN SATURATION: 95 % | RESPIRATION RATE: 18 BRPM | BODY MASS INDEX: 29.63 KG/M2 | SYSTOLIC BLOOD PRESSURE: 150 MMHG | HEIGHT: 70 IN | TEMPERATURE: 96.5 F | HEART RATE: 64 BPM | DIASTOLIC BLOOD PRESSURE: 94 MMHG

## 2021-10-13 DIAGNOSIS — Z12.11 COLON CANCER SCREENING: ICD-10-CM

## 2021-10-13 LAB — GLUCOSE SERPL-MCNC: 127 MG/DL (ref 65–140)

## 2021-10-13 PROCEDURE — 88305 TISSUE EXAM BY PATHOLOGIST: CPT | Performed by: PATHOLOGY

## 2021-10-13 PROCEDURE — 82948 REAGENT STRIP/BLOOD GLUCOSE: CPT

## 2021-10-13 PROCEDURE — 45385 COLONOSCOPY W/LESION REMOVAL: CPT | Performed by: COLON & RECTAL SURGERY

## 2021-10-13 RX ORDER — PROPOFOL 10 MG/ML
INJECTION, EMULSION INTRAVENOUS AS NEEDED
Status: DISCONTINUED | OUTPATIENT
Start: 2021-10-13 | End: 2021-10-13

## 2021-10-13 RX ORDER — SODIUM CHLORIDE, SODIUM LACTATE, POTASSIUM CHLORIDE, CALCIUM CHLORIDE 600; 310; 30; 20 MG/100ML; MG/100ML; MG/100ML; MG/100ML
INJECTION, SOLUTION INTRAVENOUS CONTINUOUS PRN
Status: DISCONTINUED | OUTPATIENT
Start: 2021-10-13 | End: 2021-10-13

## 2021-10-13 RX ADMIN — PROPOFOL 50 MG: 10 INJECTION, EMULSION INTRAVENOUS at 10:16

## 2021-10-13 RX ADMIN — PROPOFOL 50 MG: 10 INJECTION, EMULSION INTRAVENOUS at 10:10

## 2021-10-13 RX ADMIN — PROPOFOL 50 MG: 10 INJECTION, EMULSION INTRAVENOUS at 10:01

## 2021-10-13 RX ADMIN — SODIUM CHLORIDE, SODIUM LACTATE, POTASSIUM CHLORIDE, AND CALCIUM CHLORIDE: .6; .31; .03; .02 INJECTION, SOLUTION INTRAVENOUS at 08:37

## 2021-10-13 RX ADMIN — PROPOFOL 50 MG: 10 INJECTION, EMULSION INTRAVENOUS at 10:14

## 2021-10-13 RX ADMIN — PROPOFOL 50 MG: 10 INJECTION, EMULSION INTRAVENOUS at 10:07

## 2021-10-13 RX ADMIN — PROPOFOL 50 MG: 10 INJECTION, EMULSION INTRAVENOUS at 10:05

## 2021-10-13 RX ADMIN — PROPOFOL 50 MG: 10 INJECTION, EMULSION INTRAVENOUS at 10:03

## 2021-10-13 RX ADMIN — PROPOFOL 50 MG: 10 INJECTION, EMULSION INTRAVENOUS at 10:12

## 2021-10-13 RX ADMIN — PROPOFOL 20 MG: 10 INJECTION, EMULSION INTRAVENOUS at 09:59

## 2021-10-13 RX ADMIN — PROPOFOL 80 MG: 10 INJECTION, EMULSION INTRAVENOUS at 09:56

## 2021-10-13 RX ADMIN — SODIUM CHLORIDE, SODIUM LACTATE, POTASSIUM CHLORIDE, AND CALCIUM CHLORIDE: .6; .31; .03; .02 INJECTION, SOLUTION INTRAVENOUS at 09:53

## 2021-10-21 ENCOUNTER — IMMUNIZATIONS (OUTPATIENT)
Dept: INTERNAL MEDICINE CLINIC | Facility: CLINIC | Age: 71
End: 2021-10-21
Payer: MEDICARE

## 2021-10-21 DIAGNOSIS — Z23 ENCOUNTER FOR IMMUNIZATION: Primary | ICD-10-CM

## 2021-10-21 PROCEDURE — 90662 IIV NO PRSV INCREASED AG IM: CPT | Performed by: INTERNAL MEDICINE

## 2021-10-21 PROCEDURE — G0008 ADMIN INFLUENZA VIRUS VAC: HCPCS | Performed by: INTERNAL MEDICINE

## 2021-11-16 ENCOUNTER — OFFICE VISIT (OUTPATIENT)
Dept: OBGYN CLINIC | Facility: HOSPITAL | Age: 71
End: 2021-11-16
Payer: MEDICARE

## 2021-11-16 VITALS
BODY MASS INDEX: 30.24 KG/M2 | WEIGHT: 211.2 LBS | SYSTOLIC BLOOD PRESSURE: 164 MMHG | HEIGHT: 70 IN | HEART RATE: 59 BPM | DIASTOLIC BLOOD PRESSURE: 83 MMHG

## 2021-11-16 DIAGNOSIS — M17.11 PRIMARY OSTEOARTHRITIS OF RIGHT KNEE: Primary | ICD-10-CM

## 2021-11-16 PROCEDURE — 99213 OFFICE O/P EST LOW 20 MIN: CPT | Performed by: ORTHOPAEDIC SURGERY

## 2021-12-20 ENCOUNTER — APPOINTMENT (OUTPATIENT)
Dept: LAB | Facility: HOSPITAL | Age: 71
End: 2021-12-20
Payer: MEDICARE

## 2021-12-20 DIAGNOSIS — E11.65 TYPE 2 DIABETES MELLITUS WITH HYPERGLYCEMIA, WITHOUT LONG-TERM CURRENT USE OF INSULIN (HCC): ICD-10-CM

## 2021-12-20 DIAGNOSIS — I10 BENIGN ESSENTIAL HYPERTENSION: ICD-10-CM

## 2021-12-20 DIAGNOSIS — Z12.5 PROSTATE CANCER SCREENING: ICD-10-CM

## 2021-12-20 LAB
ANION GAP SERPL CALCULATED.3IONS-SCNC: 2 MMOL/L (ref 4–13)
BUN SERPL-MCNC: 16 MG/DL (ref 5–25)
CALCIUM SERPL-MCNC: 9.3 MG/DL (ref 8.3–10.1)
CHLORIDE SERPL-SCNC: 109 MMOL/L (ref 100–108)
CO2 SERPL-SCNC: 28 MMOL/L (ref 21–32)
CREAT SERPL-MCNC: 1.04 MG/DL (ref 0.6–1.3)
EST. AVERAGE GLUCOSE BLD GHB EST-MCNC: 148 MG/DL
GFR SERPL CREATININE-BSD FRML MDRD: 72 ML/MIN/1.73SQ M
GLUCOSE P FAST SERPL-MCNC: 137 MG/DL (ref 65–99)
HBA1C MFR BLD: 6.8 %
POTASSIUM SERPL-SCNC: 4.3 MMOL/L (ref 3.5–5.3)
PSA SERPL-MCNC: 0.8 NG/ML (ref 0–4)
SODIUM SERPL-SCNC: 139 MMOL/L (ref 136–145)

## 2021-12-20 PROCEDURE — 83036 HEMOGLOBIN GLYCOSYLATED A1C: CPT

## 2021-12-20 PROCEDURE — 36415 COLL VENOUS BLD VENIPUNCTURE: CPT

## 2021-12-20 PROCEDURE — G0103 PSA SCREENING: HCPCS

## 2021-12-20 PROCEDURE — 80048 BASIC METABOLIC PNL TOTAL CA: CPT

## 2021-12-30 ENCOUNTER — OFFICE VISIT (OUTPATIENT)
Dept: INTERNAL MEDICINE CLINIC | Facility: CLINIC | Age: 71
End: 2021-12-30
Payer: MEDICARE

## 2021-12-30 VITALS
SYSTOLIC BLOOD PRESSURE: 138 MMHG | HEART RATE: 66 BPM | WEIGHT: 211 LBS | HEIGHT: 70 IN | BODY MASS INDEX: 30.21 KG/M2 | OXYGEN SATURATION: 98 % | TEMPERATURE: 98 F | DIASTOLIC BLOOD PRESSURE: 80 MMHG

## 2021-12-30 DIAGNOSIS — I10 BENIGN ESSENTIAL HYPERTENSION: ICD-10-CM

## 2021-12-30 DIAGNOSIS — F43.21 GRIEF REACTION: ICD-10-CM

## 2021-12-30 DIAGNOSIS — E11.9 TYPE 2 DIABETES MELLITUS WITHOUT COMPLICATION, WITHOUT LONG-TERM CURRENT USE OF INSULIN (HCC): ICD-10-CM

## 2021-12-30 DIAGNOSIS — F41.9 ANXIETY: ICD-10-CM

## 2021-12-30 DIAGNOSIS — E78.2 MIXED HYPERLIPIDEMIA: ICD-10-CM

## 2021-12-30 DIAGNOSIS — E11.65 TYPE 2 DIABETES MELLITUS WITH HYPERGLYCEMIA, WITHOUT LONG-TERM CURRENT USE OF INSULIN (HCC): Primary | ICD-10-CM

## 2021-12-30 PROBLEM — F43.20 GRIEF REACTION: Status: ACTIVE | Noted: 2021-12-30

## 2021-12-30 PROCEDURE — 99214 OFFICE O/P EST MOD 30 MIN: CPT | Performed by: INTERNAL MEDICINE

## 2021-12-30 RX ORDER — CITALOPRAM 10 MG/1
10 TABLET ORAL DAILY
Qty: 90 TABLET | Refills: 3 | Status: SHIPPED | OUTPATIENT
Start: 2021-12-30 | End: 2022-03-28 | Stop reason: SDUPTHER

## 2022-01-10 ENCOUNTER — HOSPITAL ENCOUNTER (EMERGENCY)
Facility: HOSPITAL | Age: 72
Discharge: HOME | End: 2022-01-10
Attending: EMERGENCY MEDICINE | Admitting: EMERGENCY MEDICINE
Payer: MEDICARE

## 2022-01-10 ENCOUNTER — TELEPHONE (OUTPATIENT)
Dept: INTERNAL MEDICINE CLINIC | Facility: CLINIC | Age: 72
End: 2022-01-10

## 2022-01-10 VITALS
RESPIRATION RATE: 18 BRPM | TEMPERATURE: 97.7 F | HEART RATE: 64 BPM | DIASTOLIC BLOOD PRESSURE: 89 MMHG | OXYGEN SATURATION: 97 % | SYSTOLIC BLOOD PRESSURE: 184 MMHG

## 2022-01-10 DIAGNOSIS — R51.9 RIGHT FACIAL PAIN: ICD-10-CM

## 2022-01-10 DIAGNOSIS — F41.1 ANXIETY STATE: Primary | ICD-10-CM

## 2022-01-10 LAB
ATRIAL RATE: 57 BPM
P AXIS: 14 DEGREES
PR INTERVAL: 202 MS
QRS AXIS: -38 DEGREES
QRSD INTERVAL: 106 MS
QT INTERVAL: 406 MS
QTC INTERVAL: 395 MS
T WAVE AXIS: 76 DEGREES
VENTRICULAR RATE: 57 BPM

## 2022-01-10 PROCEDURE — 93010 ELECTROCARDIOGRAM REPORT: CPT | Performed by: INTERNAL MEDICINE

## 2022-01-10 PROCEDURE — 99283 EMERGENCY DEPT VISIT LOW MDM: CPT

## 2022-01-10 PROCEDURE — 99282 EMERGENCY DEPT VISIT SF MDM: CPT | Performed by: EMERGENCY MEDICINE

## 2022-01-10 PROCEDURE — 93005 ELECTROCARDIOGRAM TRACING: CPT

## 2022-01-10 NOTE — ED PROVIDER NOTES
History  Chief Complaint   Patient presents with    Facial Pain     Pt reports R sided facial pain since Sat  Pt states it might be anxiety  81-year-old male presents with 2 complaints, says he has been feeling very anxious over the past couple of days, had some pain over the right side of his face, thought it was infection thought over shingles than thought possibly related to his heart, says he can not stop thinking about it so he came here to be evaluated  Patient says he is anxious because his wife of many years recently passed away and he is still dealing with it says he was watching football which is something he typically used to do with her this made him upset  , believes symptoms related to anxiety but wanted further clarification  No associated chest pain no shortness of breath eating well drinking well, did discuss about chest pains he had previously when he had anxiety in the past but did not have at this time  No falls no injury no trauma  History provided by:  Patient      Prior to Admission Medications   Prescriptions Last Dose Informant Patient Reported? Taking?    Lancets MISC   Yes No   Sig: by Does not apply route 2 (two) times a day   aspirin 81 mg chewable tablet   Yes No   Sig: Chew 1 tablet daily   bisoprolol-hydrochlorothiazide (ZIAC) 5-6 25 MG per tablet   No No   Sig: Take 1 tablet by mouth daily   cholecalciferol (VITAMIN D3) 1,000 units tablet   No No   Sig: Take 1 tablet (1,000 Units total) by mouth daily   Patient not taking: Reported on 5/6/2021   citalopram (CeleXA) 10 mg tablet   No No   Sig: Take 1 tablet (10 mg total) by mouth daily   fenofibrate (TRICOR) 145 mg tablet   No No   Sig: TAKE 1 TABLET (145 MG TOTAL) BY MOUTH DAILY   glimepiride (AMARYL) 4 mg tablet   No No   Sig: TAKE 1 TABLET EVERY DAY   glucose blood (ACCU-CHEK GUIDE) test strip   No No   Sig: Use to test blood sugars twice daily as instructed   losartan (COZAAR) 50 mg tablet   No No   Sig: Take 1 tablet (50 mg total) by mouth daily   metFORMIN (GLUCOPHAGE-XR) 500 mg 24 hr tablet   No No   Sig: TAKE 1 TABLET TWICE DAILY   sitaGLIPtin (JANUVIA) 100 mg tablet   No No   Sig: Take 1 tablet (100 mg total) by mouth daily      Facility-Administered Medications: None       Past Medical History:   Diagnosis Date    Colon polyp     DM (diabetes mellitus), type 2 (HCC)     Hypertension        Past Surgical History:   Procedure Laterality Date    COLONOSCOPY      KNEE SURGERY Right        Family History   Problem Relation Age of Onset    Diabetes unspecified Mother      I have reviewed and agree with the history as documented  E-Cigarette/Vaping    E-Cigarette Use Never User      E-Cigarette/Vaping Substances     Social History     Tobacco Use    Smoking status: Never Smoker    Smokeless tobacco: Never Used   Vaping Use    Vaping Use: Never used   Substance Use Topics    Alcohol use: Yes     Alcohol/week: 3 0 standard drinks     Types: 3 Cans of beer per week    Drug use: Never       Review of Systems   Constitutional: Negative for fever  Respiratory: Negative for chest tightness and shortness of breath  Cardiovascular: Negative for chest pain  Skin: Negative for rash  Neurological: Negative for dizziness, light-headedness and headaches  Physical Exam  Physical Exam  Vitals reviewed  Constitutional:       Appearance: He is well-developed  HENT:      Head: Atraumatic  Mouth/Throat:      Comments: Right-sided face where patient is having discomfort no tenderness over the parotid gland, able to open and close eye difficulty no signs of TMJ , patient has an upper plate denture, this was removed and there is no sign of infection over the palate  No signs of inflammation of the gums  Eyes:      General: No scleral icterus  Right eye: No discharge  Left eye: No discharge  Conjunctiva/sclera: Conjunctivae normal    Neck:      Trachea: No tracheal deviation  Pulmonary:      Effort: Pulmonary effort is normal  No respiratory distress  Breath sounds: No stridor  Musculoskeletal:         General: No deformity  Cervical back: Neck supple  Skin:     General: Skin is warm and dry  Coloration: Skin is not pale  Findings: No erythema or rash  Neurological:      Mental Status: He is alert  Motor: No abnormal muscle tone  Coordination: Coordination normal          Vital Signs  ED Triage Vitals [01/10/22 1011]   Temperature Pulse Respirations Blood Pressure SpO2   97 7 °F (36 5 °C) 64 18 (!) 184/89 97 %      Temp Source Heart Rate Source Patient Position - Orthostatic VS BP Location FiO2 (%)   Oral Monitor Sitting Left arm --      Pain Score       3           Vitals:    01/10/22 1011   BP: (!) 184/89   Pulse: 64   Patient Position - Orthostatic VS: Sitting         Visual Acuity      ED Medications  Medications - No data to display    Diagnostic Studies  Results Reviewed     None                 No orders to display              Procedures  Procedures         ED Course                                             MDM  Number of Diagnoses or Management Options  Anxiety state: new and requires workup  Right facial pain: new and requires workup  Diagnosis management comments: Unremarkable physical examination, very anxious, likely anxiety related will discharge       Amount and/or Complexity of Data Reviewed  Review and summarize past medical records: yes        Disposition  Final diagnoses:   Anxiety state   Right facial pain     Time reflects when diagnosis was documented in both MDM as applicable and the Disposition within this note     Time User Action Codes Description Comment    1/10/2022 12:33 PM Lafrances Panning Add [F41 1] Anxiety state     1/10/2022 12:33 PM Lafrances Panning Add [R51 9] Right facial pain       ED Disposition     ED Disposition Condition Date/Time Comment    Discharge Stable Mon Fahad 10, 2022 12:33 PM Paulo Artis  discharge to home/self care  Follow-up Information    None         Discharge Medication List as of 1/10/2022 12:33 PM      CONTINUE these medications which have NOT CHANGED    Details   aspirin 81 mg chewable tablet Chew 1 tablet daily, Starting Thu 12/6/2012, Historical Med      bisoprolol-hydrochlorothiazide (ZIAC) 5-6 25 MG per tablet Take 1 tablet by mouth daily, Starting Mon 6/28/2021, Normal      cholecalciferol (VITAMIN D3) 1,000 units tablet Take 1 tablet (1,000 Units total) by mouth daily, Starting Wed 10/21/2020, No Print      citalopram (CeleXA) 10 mg tablet Take 1 tablet (10 mg total) by mouth daily, Starting Thu 12/30/2021, Normal      fenofibrate (TRICOR) 145 mg tablet TAKE 1 TABLET (145 MG TOTAL) BY MOUTH DAILY, Starting Wed 3/3/2021, Normal      glimepiride (AMARYL) 4 mg tablet TAKE 1 TABLET EVERY DAY, Normal      glucose blood (ACCU-CHEK GUIDE) test strip Use to test blood sugars twice daily as instructed, Normal      Lancets MISC by Does not apply route 2 (two) times a day, Starting Fri 10/6/2017, Historical Med      losartan (COZAAR) 50 mg tablet Take 1 tablet (50 mg total) by mouth daily, Starting Mon 3/15/2021, Normal      metFORMIN (GLUCOPHAGE-XR) 500 mg 24 hr tablet TAKE 1 TABLET TWICE DAILY, Normal      sitaGLIPtin (JANUVIA) 100 mg tablet Take 1 tablet (100 mg total) by mouth daily, Starting Thu 12/30/2021, Normal             No discharge procedures on file      PDMP Review     None          ED Provider  Electronically Signed by           Epifanio Mann DO  01/10/22 1183

## 2022-02-08 DIAGNOSIS — E11.9 TYPE 2 DIABETES MELLITUS WITHOUT COMPLICATION, WITHOUT LONG-TERM CURRENT USE OF INSULIN (HCC): ICD-10-CM

## 2022-02-08 DIAGNOSIS — I10 ESSENTIAL HYPERTENSION: ICD-10-CM

## 2022-02-09 RX ORDER — FENOFIBRATE 145 MG/1
145 TABLET, COATED ORAL DAILY
Qty: 90 TABLET | Refills: 3 | Status: SHIPPED | OUTPATIENT
Start: 2022-02-09

## 2022-02-22 ENCOUNTER — ESTABLISHED COMPREHENSIVE EXAM (OUTPATIENT)
Dept: URBAN - METROPOLITAN AREA CLINIC 6 | Facility: CLINIC | Age: 72
End: 2022-02-22

## 2022-02-22 DIAGNOSIS — E11.9: ICD-10-CM

## 2022-02-22 DIAGNOSIS — H25.813: ICD-10-CM

## 2022-02-22 PROCEDURE — 92015 DETERMINE REFRACTIVE STATE: CPT

## 2022-02-22 PROCEDURE — 92014 COMPRE OPH EXAM EST PT 1/>: CPT

## 2022-02-22 ASSESSMENT — TONOMETRY
OS_IOP_MMHG: 18
OD_IOP_MMHG: 16

## 2022-02-22 ASSESSMENT — VISUAL ACUITY
OD_CC: J1
OS_CC: J1
OS_CC: 20/30+3
OD_CC: 20/30+2

## 2022-03-08 DIAGNOSIS — E11.9 TYPE 2 DIABETES MELLITUS WITHOUT COMPLICATION, WITHOUT LONG-TERM CURRENT USE OF INSULIN (HCC): ICD-10-CM

## 2022-03-08 RX ORDER — METFORMIN HYDROCHLORIDE 500 MG/1
TABLET, EXTENDED RELEASE ORAL
Qty: 180 TABLET | Refills: 3 | Status: SHIPPED | OUTPATIENT
Start: 2022-03-08

## 2022-03-28 DIAGNOSIS — F41.9 ANXIETY: ICD-10-CM

## 2022-03-28 DIAGNOSIS — I10 ESSENTIAL HYPERTENSION: ICD-10-CM

## 2022-03-28 RX ORDER — BISOPROLOL FUMARATE AND HYDROCHLOROTHIAZIDE 5; 6.25 MG/1; MG/1
1 TABLET ORAL DAILY
Qty: 90 TABLET | Refills: 3 | Status: SHIPPED | OUTPATIENT
Start: 2022-03-28 | End: 2022-06-27 | Stop reason: SDUPTHER

## 2022-03-28 RX ORDER — LOSARTAN POTASSIUM 50 MG/1
50 TABLET ORAL DAILY
Qty: 90 TABLET | Refills: 3 | Status: SHIPPED | OUTPATIENT
Start: 2022-03-28 | End: 2022-06-27 | Stop reason: SDUPTHER

## 2022-03-28 RX ORDER — CITALOPRAM 10 MG/1
10 TABLET ORAL DAILY
Qty: 90 TABLET | Refills: 3 | Status: SHIPPED | OUTPATIENT
Start: 2022-03-28 | End: 2022-06-27 | Stop reason: SDUPTHER

## 2022-04-22 ENCOUNTER — RA CDI HCC (OUTPATIENT)
Dept: OTHER | Facility: HOSPITAL | Age: 72
End: 2022-04-22

## 2022-04-22 NOTE — PROGRESS NOTES
[facility]  coding opportunities          Chart Reviewed number of suggestions sent to Provider: 1   E11 [id number]      Patients Insurance     Medicare Insurance: Medicare

## 2022-04-25 ENCOUNTER — APPOINTMENT (OUTPATIENT)
Dept: LAB | Facility: HOSPITAL | Age: 72
End: 2022-04-25
Payer: MEDICARE

## 2022-04-25 DIAGNOSIS — E11.9 TYPE 2 DIABETES MELLITUS WITHOUT COMPLICATION, WITHOUT LONG-TERM CURRENT USE OF INSULIN (HCC): ICD-10-CM

## 2022-04-25 DIAGNOSIS — I10 BENIGN ESSENTIAL HYPERTENSION: ICD-10-CM

## 2022-04-25 LAB
ANION GAP SERPL CALCULATED.3IONS-SCNC: 1 MMOL/L (ref 4–13)
BUN SERPL-MCNC: 21 MG/DL (ref 5–25)
CALCIUM SERPL-MCNC: 9.4 MG/DL (ref 8.3–10.1)
CHLORIDE SERPL-SCNC: 106 MMOL/L (ref 100–108)
CO2 SERPL-SCNC: 30 MMOL/L (ref 21–32)
CREAT SERPL-MCNC: 1.1 MG/DL (ref 0.6–1.3)
EST. AVERAGE GLUCOSE BLD GHB EST-MCNC: 177 MG/DL
GFR SERPL CREATININE-BSD FRML MDRD: 67 ML/MIN/1.73SQ M
GLUCOSE P FAST SERPL-MCNC: 179 MG/DL (ref 65–99)
HBA1C MFR BLD: 7.8 %
POTASSIUM SERPL-SCNC: 4.8 MMOL/L (ref 3.5–5.3)
SODIUM SERPL-SCNC: 137 MMOL/L (ref 136–145)

## 2022-04-25 PROCEDURE — 83036 HEMOGLOBIN GLYCOSYLATED A1C: CPT

## 2022-04-25 PROCEDURE — 80048 BASIC METABOLIC PNL TOTAL CA: CPT

## 2022-04-25 PROCEDURE — 36415 COLL VENOUS BLD VENIPUNCTURE: CPT

## 2022-04-29 ENCOUNTER — OFFICE VISIT (OUTPATIENT)
Dept: INTERNAL MEDICINE CLINIC | Facility: CLINIC | Age: 72
End: 2022-04-29
Payer: MEDICARE

## 2022-04-29 VITALS
OXYGEN SATURATION: 95 % | SYSTOLIC BLOOD PRESSURE: 132 MMHG | BODY MASS INDEX: 30.21 KG/M2 | HEART RATE: 64 BPM | WEIGHT: 211 LBS | DIASTOLIC BLOOD PRESSURE: 80 MMHG | TEMPERATURE: 97.8 F | HEIGHT: 70 IN

## 2022-04-29 DIAGNOSIS — I10 BENIGN ESSENTIAL HYPERTENSION: Primary | ICD-10-CM

## 2022-04-29 DIAGNOSIS — E78.2 MIXED HYPERLIPIDEMIA: ICD-10-CM

## 2022-04-29 DIAGNOSIS — F43.21 GRIEF REACTION: ICD-10-CM

## 2022-04-29 DIAGNOSIS — G25.0 ESSENTIAL TREMOR: ICD-10-CM

## 2022-04-29 DIAGNOSIS — E66.09 CLASS 1 OBESITY DUE TO EXCESS CALORIES WITH SERIOUS COMORBIDITY AND BODY MASS INDEX (BMI) OF 30.0 TO 30.9 IN ADULT: ICD-10-CM

## 2022-04-29 DIAGNOSIS — E11.65 TYPE 2 DIABETES MELLITUS WITH HYPERGLYCEMIA, WITHOUT LONG-TERM CURRENT USE OF INSULIN (HCC): ICD-10-CM

## 2022-04-29 PROBLEM — E66.811 CLASS 1 OBESITY DUE TO EXCESS CALORIES WITH SERIOUS COMORBIDITY IN ADULT: Status: ACTIVE | Noted: 2022-04-29

## 2022-04-29 PROCEDURE — 99214 OFFICE O/P EST MOD 30 MIN: CPT | Performed by: INTERNAL MEDICINE

## 2022-04-29 NOTE — ASSESSMENT & PLAN NOTE
Longstanding history of hypertension  With initial presentation to the office is blood pressure was elevated but once the patient was allowed to sit relax it did come down to an acceptable range  Patient will continue present medication and surveillance check on his renal function with his next visit

## 2022-04-29 NOTE — ASSESSMENT & PLAN NOTE
Patient had been placed on medication for his tremor  Diagnosis of Parkinson's disease  At this time is only taking the Cherie levodopa but once daily  States his tremors under control  Discussed with him seeing neurology for further evaluation but he states he would prefer to stay with present medication and call if any new problems

## 2022-04-29 NOTE — ASSESSMENT & PLAN NOTE
Patient does have a longstanding history of diabetes  He admits that he has not been watching his diet closely  He relates that a lot of his problems with elevated blood sugar readings deal with his family trying to make sure that he gets adequate nutrition and they are supportive of him especially his recently with the loss of wife  As noted sugar is elevated to 7 8 hemoglobin A1c and he had been well controlled previously  Patient knows that he needs to watch his diet more closely  He will still remain physically active and is doing a lot of work around the house especially in the spring and summer  We will check a fasting blood sugar hemoglobin A1c    He is up-to-date with diabetic eye exam diabetic foot exam   Lab Results   Component Value Date    HGBA1C 7 8 (H) 04/25/2022

## 2022-04-29 NOTE — ASSESSMENT & PLAN NOTE
As noted patient watching his diet, he is now gone from being overweight to obese  We did have a long discussion today with the patient about the importance not only of diet decreasing his caloric intake in order to have better control of his weight  He states he will be more physically active outside especially during the spring and summer months  He understands the importance of working to lose weight not only for his health and welfare but also to make sugar his sugars under control    States he will be looking closely at the ways that he can decrease his caloric intake and he intends to increase his routine exercise especially working outside the house

## 2022-04-29 NOTE — PROGRESS NOTES
Assessment/Plan:    Type 2 diabetes mellitus with hyperglycemia, without long-term current use of insulin ([address] )  Patient does have a longstanding history of diabetes  He admits that he has not been watching his diet closely  He relates that a lot of his problems with elevated blood sugar readings deal with his family trying to make sure that he gets adequate nutrition and they are supportive of him especially his recently with the loss of wife  As noted sugar is elevated to 7 8 hemoglobin A1c and he had been well controlled previously  Patient knows that he needs to watch his diet more closely  He will still remain physically active and is doing a lot of work around the house especially in the spring and summer  We will check a fasting blood sugar hemoglobin A1c  He is up-to-date with diabetic eye exam diabetic foot exam   Lab Results   Component Value Date    HGBA1C 7 8 (H) 04/25/2022       Benign essential hypertension  Longstanding history of hypertension  With initial presentation to the office is blood pressure was elevated but once the patient was allowed to sit relax it did come down to an acceptable range  Patient will continue present medication and surveillance check on his renal function with his next visit  Grief reaction  Patient still is going through a grief reaction with the loss of his wife  He states he has a lot of family support and also with friends  He states he is having no difficulty sleeping at night continues to take the Celexa as prescribed  Patient was told if any new emotional problems or concerns to please call  Essential tremor  Patient had been placed on medication for his tremor  Diagnosis of Parkinson's disease  At this time is only taking the Cherie levodopa but once daily  States his tremors under control    Discussed with him seeing neurology for further evaluation but he states he would prefer to stay with present medication and call if any new problems  Class 1 obesity due to excess calories with serious comorbidity in adult  As noted patient watching his diet, he is now gone from being overweight to obese  We did have a long discussion today with the patient about the importance not only of diet decreasing his caloric intake in order to have better control of his weight  He states he will be more physically active outside especially during the spring and summer months  He understands the importance of working to lose weight not only for his health and welfare but also to make sugar his sugars under control  States he will be looking closely at the ways that he can decrease his caloric intake and he intends to increase his routine exercise especially working outside the house       Diagnoses and all orders for this visit:    Benign essential hypertension  -     Basic metabolic panel; Future    Grief reaction    Type 2 diabetes mellitus with hyperglycemia, without long-term current use of insulin (HCC)  -     Hemoglobin A1C; Future    Mixed hyperlipidemia  -     Lipid panel; Future    Essential tremor    Class 1 obesity due to excess calories with serious comorbidity and body mass index (BMI) of 30 0 to 30 9 in adult          Subjective:      Patient ID: Sonal Harper  is a 80 y o  male  Patient is a 67-year-old male history of multiple problems as outlined previously who is here today for routine follow-up  He did have labs performed prior to the visit today and we did discuss the results of length with the patient  Patient relates that he has support from his family after the passing of his wife  He states they are sometimes to good to him making sure he is getting adequate nutrition in excess  Patient states from a emotional standpoint with family support and with friends he is stable        The following portions of the patient's history were reviewed and updated as appropriate:   He  has a past medical history of Colon polyp, DM (diabetes mellitus), type 2 ([facility] [address] ), and Hypertension  He   Patient Active Problem List    Diagnosis Date Noted    Class 1 obesity due to excess calories with serious comorbidity in adult 04/29/2022    Grief reaction 12/30/2021    Bronchitis 09/13/2021    Essential tremor 11/13/2020    Vitamin D deficiency 10/21/2020    Overweight 02/10/2020    Acute pain of right knee 10/08/2018    Healthcare maintenance 05/22/2018    Acute sinusitis 12/29/2017    Type 2 diabetes mellitus with hyperglycemia, without long-term current use of insulin ([facility] ) 08/04/2014    Anxiety 09/20/2012    Benign essential hypertension 09/20/2012    Hyperlipidemia 09/20/2012     He  has a past surgical history that includes Knee surgery (Right) and Colonoscopy  His family history includes Diabetes unspecified in his mother  He  reports that he has never smoked  He has never used smokeless tobacco  He reports current alcohol use of about 3 0 standard drinks of alcohol per week  He reports that he does not use drugs    Current Outpatient Medications   Medication Sig Dispense Refill    aspirin 81 mg chewable tablet Chew 1 tablet daily      bisoprolol-hydrochlorothiazide (ZIAC) 5-6 25 MG per tablet Take 1 tablet by mouth daily 90 tablet 3    citalopram (CeleXA) 10 mg tablet Take 1 tablet (10 mg total) by mouth daily 90 tablet 3    fenofibrate (TRICOR) 145 mg tablet TAKE 1 TABLET (145 MG TOTAL) BY MOUTH DAILY 90 tablet 3    glimepiride (AMARYL) 4 mg tablet TAKE 1 TABLET EVERY DAY 90 tablet 3    glucose blood (ACCU-CHEK GUIDE) test strip Use to test blood sugars twice daily as instructed 100 each 1    Lancets MISC by Does not apply route 2 (two) times a day      losartan (COZAAR) 50 mg tablet Take 1 tablet (50 mg total) by mouth daily 90 tablet 3    metFORMIN (GLUCOPHAGE-XR) 500 mg 24 hr tablet TAKE 1 TABLET TWICE DAILY 180 tablet 3    sitaGLIPtin (JANUVIA) 100 mg tablet Take 1 tablet (100 mg total) by mouth daily 30 tablet 4    cholecalciferol (VITAMIN D3) 1,000 units tablet Take 1 tablet (1,000 Units total) by mouth daily (Patient not taking: Reported on 5/6/2021)       No current facility-administered medications for this visit  Current Outpatient Medications on File Prior to Visit   Medication Sig    aspirin 81 mg chewable tablet Chew 1 tablet daily    bisoprolol-hydrochlorothiazide (ZIAC) 5-6 25 MG per tablet Take 1 tablet by mouth daily    citalopram (CeleXA) 10 mg tablet Take 1 tablet (10 mg total) by mouth daily    fenofibrate (TRICOR) 145 mg tablet TAKE 1 TABLET (145 MG TOTAL) BY MOUTH DAILY    glimepiride (AMARYL) 4 mg tablet TAKE 1 TABLET EVERY DAY    glucose blood (ACCU-CHEK GUIDE) test strip Use to test blood sugars twice daily as instructed    Lancets MISC by Does not apply route 2 (two) times a day    losartan (COZAAR) 50 mg tablet Take 1 tablet (50 mg total) by mouth daily    metFORMIN (GLUCOPHAGE-XR) 500 mg 24 hr tablet TAKE 1 TABLET TWICE DAILY    sitaGLIPtin (JANUVIA) 100 mg tablet Take 1 tablet (100 mg total) by mouth daily    cholecalciferol (VITAMIN D3) 1,000 units tablet Take 1 tablet (1,000 Units total) by mouth daily (Patient not taking: Reported on 5/6/2021)    [DISCONTINUED] carbidopa-levodopa (SINEMET)  mg per tablet Take 1 tablet by mouth 2 (two) times a day (Patient taking differently: Take 1 tablet by mouth daily )     No current facility-administered medications on file prior to visit  He has No Known Allergies       Review of Systems   Constitutional: Positive for activity change (States that with the spring he is more physically active especially working outside the house)  Negative for appetite change, chills, diaphoresis, fatigue, fever and unexpected weight change  HENT: Negative  Eyes: Negative  Respiratory: Negative  Cardiovascular: Negative  Gastrointestinal: Negative  Endocrine: Negative  Genitourinary: Negative  Musculoskeletal: Negative      Skin: Negative  Allergic/Immunologic: Negative  Neurological: Negative  Hematological: Negative  Psychiatric/Behavioral: Negative  Objective:      /80   Pulse 64   Temp 97 8 °F (36 6 °C)   Ht 5' 10" (1 778 m)   Wt 95 7 kg (211 lb)   SpO2 95%   BMI 30 28 kg/m²          Physical Exam  Vitals and nursing note reviewed  Constitutional:       General: He is not in acute distress  Appearance: Normal appearance  He is obese  He is not ill-appearing, toxic-appearing or diaphoretic  Comments: Very pleasant now obese 61-year-old male who is awake alert no acute distress and oriented x3   HENT:      Head: Normocephalic and atraumatic  Right Ear: Tympanic membrane, ear canal and external ear normal  There is no impacted cerumen  Left Ear: Tympanic membrane, ear canal and external ear normal  There is no impacted cerumen  Nose: Nose normal  No congestion or rhinorrhea  Mouth/Throat:      Mouth: Mucous membranes are moist       Pharynx: Oropharynx is clear  No oropharyngeal exudate or posterior oropharyngeal erythema  Eyes:      General: No scleral icterus  Right eye: No discharge  Left eye: No discharge  Extraocular Movements: Extraocular movements intact  Conjunctiva/sclera: Conjunctivae normal       Pupils: Pupils are equal, round, and reactive to light  Neck:      Vascular: No carotid bruit  Cardiovascular:      Rate and Rhythm: Normal rate and regular rhythm  Pulses: Normal pulses  Heart sounds: Normal heart sounds  No murmur heard  No friction rub  No gallop  Pulmonary:      Effort: Pulmonary effort is normal  No respiratory distress  Breath sounds: Normal breath sounds  No stridor  No wheezing, rhonchi or rales  Chest:      Chest wall: No tenderness  Abdominal:      General: Bowel sounds are normal  There is no distension  Palpations: Abdomen is soft  There is no mass  Tenderness:  There is no abdominal tenderness  There is no right CVA tenderness, left CVA tenderness, guarding or rebound  Hernia: No hernia is present  Comments: Obese   Musculoskeletal:         General: No swelling, tenderness, deformity or signs of injury  Normal range of motion  Cervical back: Normal range of motion and neck supple  No rigidity or tenderness  Right lower leg: No edema  Left lower leg: No edema  Lymphadenopathy:      Cervical: No cervical adenopathy  Skin:     General: Skin is warm and dry  Capillary Refill: Capillary refill takes less than 2 seconds  Coloration: Skin is not jaundiced or pale  Findings: No bruising, erythema, lesion or rash  Neurological:      General: No focal deficit present  Mental Status: He is alert and oriented to person, place, and time  Mental status is at baseline  Cranial Nerves: No cranial nerve deficit  Sensory: No sensory deficit  Motor: No weakness  Coordination: Coordination normal       Gait: Gait normal       Deep Tendon Reflexes: Reflexes normal    Psychiatric:         Mood and Affect: Mood normal          Behavior: Behavior normal          Thought Content: Thought content normal          Judgment: Judgment normal          BMI Counseling: Body mass index is 30 28 kg/m²  The BMI is above normal  Nutrition recommendations include reducing portion sizes, decreasing overall calorie intake, 3-5 servings of fruits/vegetables daily, consuming healthier snacks, moderation in carbohydrate intake, increasing intake of lean protein, reducing intake of saturated fat and trans fat and reducing intake of cholesterol  Exercise recommendations include exercising 3-5 times per week

## 2022-04-29 NOTE — ASSESSMENT & PLAN NOTE
Patient still is going through a grief reaction with the loss of his wife  He states he has a lot of family support and also with friends  He states he is having no difficulty sleeping at night continues to take the Celexa as prescribed  Patient was told if any new emotional problems or concerns to please call

## 2022-04-29 NOTE — PATIENT INSTRUCTIONS
Obesity   AMBULATORY CARE:   Obesity  means your body mass index (BMI) is greater than 30  Your healthcare provider will use your height and weight to measure your BMI  The risks of obesity include  many health problems, including injuries or physical disability  · Diabetes (high blood sugar level)    · High blood pressure or high cholesterol    · Heart disease    · Stroke    · Gallbladder or liver disease    · Cancer of the colon, breast, prostate, liver, or kidney    · Sleep apnea    · Arthritis or gout    Screening  is done to check for health conditions before you have signs or symptoms  If you are 28to 79years old, your blood sugar level may be checked every 3 years for signs of prediabetes or diabetes  Your healthcare provider will check your blood pressure at each visit  High blood pressure can lead to a stroke or other problems  Your provider may check for signs of heart disease, cancer, or other health problems  Seek care immediately if:   · You have a severe headache, confusion, or difficulty speaking  · You have weakness on one side of your body  · You have chest pain, sweating, or shortness of breath  Call your doctor if:   · You have symptoms of gallbladder or liver disease, such as pain in your upper abdomen  · You have knee or hip pain and discomfort while walking  · You have symptoms of diabetes, such as intense hunger and thirst, and frequent urination  · You have symptoms of sleep apnea, such as snoring or daytime sleepiness  · You have questions or concerns about your condition or care  Treatment for obesity  focuses on helping you lose weight to improve your health  Even a small decrease in BMI can reduce the risk for many health problems  Your healthcare provider will help you set a weight-loss goal   · Lifestyle changes  are the first step in treating obesity  These include making healthy food choices and getting regular physical activity   Your healthcare provider may suggest a weight-loss program that involves coaching, education, and therapy  · Medicine  may help you lose weight when it is used with a healthy foods and physical activity  · Surgery  can help you lose weight if you are very obese and have other health problems  There are several types of weight-loss surgery  Ask your healthcare provider for more information  Tips for safe weight loss:   · Set small, realistic goals  An example of a small goal is to walk for 20 minutes 5 days a week  Anther goal is to lose 5% of your body weight  · Tell friends, family members, and coworkers about your goals  and ask for their support  Ask a friend to lose weight with you, or join a weight-loss support group  · Identify foods or triggers that may cause you to overeat , and find ways to avoid them  Remove tempting high-calorie foods from your home and workplace  Place a bowl of fresh fruit on your kitchen counter  If stress causes you to eat, then find other ways to cope with stress  A counselor or therapist may be able to help you  · Keep a diary to track what you eat and drink  Also write down how many minutes of physical activity you do each day  Weigh yourself once a week and record it in your diary  Eating changes: You will need to eat 500 to 1,000 fewer calories each day than you currently eat to lose 1 to 2 pounds a week  The following changes will help you cut calories:  · Eat smaller portions  Use small plates, no larger than 9 inches in diameter  Fill your plate half full of fruits and vegetables  Measure your food using measuring cups until you know what a serving size looks like  · Eat 3 meals and 1 or 2 snacks each day  Plan your meals in advance  [place] and eat at home most of the time  Eat slowly  Do not skip meals  Skipping meals can lead to overeating later in the day  This can make it harder for you to lose weight   Talk with a dietitian to help you make a meal plan and schedule that is right for you  · Eat fruits and vegetables at every meal   They are low in calories and high in fiber, which makes you feel full  Do not add butter, margarine, or cream sauce to vegetables  Use herbs to season steamed vegetables  · Eat less fat and fewer fried foods  Eat more baked or grilled chicken and fish  These protein sources are lower in calories and fat than red meat  Limit fast food  Dress your salads with olive oil and vinegar instead of bottled dressing  · Limit the amount of sugar you eat  Do not drink sugary beverages  Limit alcohol  Activity changes:  Physical activity is good for your body in many ways  It helps you burn calories and build strong muscles  It decreases stress and depression, and improves your mood  It can also help you sleep better  Talk to your healthcare provider before you begin an exercise program   · Exercise for at least 30 minutes 5 days a week  Start slowly  Set aside time each day for physical activity that you enjoy and that is convenient for you  It is best to do both weight training and an activity that increases your heart rate, such as walking, bicycling, or swimming  · Find ways to be more active  Do yard work and housecleaning  Walk up the stairs instead of using elevators  Spend your leisure time going to events that require walking, such as outdoor festivals or fairs  This extra physical activity can help you lose weight and keep it off  Follow up with your doctor as directed: You may need to meet with a dietitian  Write down your questions so you remember to ask them during your visits  © Copyright [employer] 2022 Information is for End User's use only and may not be sold, redistributed or otherwise used for commercial purposes  All illustrations and images included in CareNotes® are the copyrighted property of A D A M , Inc  or Antonette Corcoran   The above information is an  only   It is not intended as medical advice for individual conditions or treatments  Talk to your doctor, nurse or pharmacist before following any medical regimen to see if it is safe and effective for you  Low Fat Diet   AMBULATORY CARE:   A low-fat diet  is an eating plan that is low in total fat, unhealthy fat, and cholesterol  You may need to follow a low-fat diet if you have trouble digesting or absorbing fat  You may also need to follow this diet if you have high cholesterol  You can also lower your cholesterol by increasing the amount of fiber in your diet  Soluble fiber is a type of fiber that helps to decrease cholesterol levels  Different types of fat in food:   · Limit unhealthy fats  A diet that is high in cholesterol, saturated fat, and trans fat may cause unhealthy cholesterol levels  Unhealthy cholesterol levels increase your risk of heart disease  ? Cholesterol:  Limit intake of cholesterol to less than 200 mg per day  Cholesterol is found in meat, eggs, and dairy  ? Saturated fat:  Limit saturated fat to less than 7% of your total daily calories  Ask your dietitian how many calories you need each day  Saturated fat is found in butter, cheese, ice cream, whole milk, and palm oil  Saturated fat is also found in meat, such as beef, pork, chicken skin, and processed meats  Processed meats include sausage, hot dogs, and bologna  ? Trans fat:  Avoid trans fat as much as possible  Trans fat is used in fried and baked foods  Foods that say trans fat free on the label may still have up to 0 5 grams of trans fat per serving  · Include healthy fats  Replace foods that are high in saturated and trans fat with foods high in healthy fats  This may help to decrease high cholesterol levels  ? Monounsaturated fats: These are found in avocados, nuts, and vegetable oils, such as olive, canola, and sunflower oil  ? Polyunsaturated fats: These can be found in vegetable oils, such as soybean or corn oil   Omega-3 fats can help to decrease the risk of heart disease  Omega-3 fats are found in fish, such as salmon, herring, trout, and tuna  Omega-3 fats can also be found in plant foods, such as walnuts, flaxseed, soybeans, and canola oil  Foods to limit or avoid:   · Grains:      ? Snacks that are made with partially hydrogenated oils, such as chips, regular crackers, and butter-flavored popcorn    ? High-fat baked goods, such as biscuits, croissants, doughnuts, pies, cookies, and pastries    · Dairy:      ? Whole milk, 2% milk, and yogurt and ice cream made with whole milk    ? Half and half creamer, heavy cream, and whipping cream    ? Cheese, cream cheese, and sour cream    · Meats and proteins:      ? High-fat cuts of meat (T-bone steak, regular hamburger, and ribs)    ? Fried meat, poultry (turkey and chicken), and fish    ? Poultry (chicken and turkey) with skin    ? Cold cuts (salami or bologna), hot dogs, bryant, and sausage    ? Whole eggs and egg yolks    · Vegetables and fruits with added fat:      ? Fried vegetables or vegetables in butter or high-fat sauces, such as cream or cheese sauces    ? Fried fruit or fruit served with butter or cream    · Fats:      ? Butter, stick margarine, and shortening    ? Coconut, palm oil, and palm kernel oil    Foods to include:   · Grains:      ? Whole-grain breads, cereals, pasta, and brown rice    ? Low-fat crackers and pretzels    · Vegetables and fruits:      ? Fresh, frozen, or canned vegetables (no salt or low-sodium)    ? Fresh, frozen, dried, or canned fruit (canned in light syrup or fruit juice)    ? Avocado    · Low-fat dairy products:      ? Nonfat (skim) or 1% milk    ? Nonfat or low-fat cheese, yogurt, and cottage cheese    · Meats and proteins:      ? Chicken or turkey with no skin    ? Baked or broiled fish    ? Lean beef and pork (loin, round, extra lean hamburger)    ? Beans and peas, unsalted nuts, soy products    ? Egg whites and substitutes    ?  Seeds and nuts    · Fats:      ? Unsaturated oil, such as canola, olive, peanut, soybean, or sunflower oil    ? Soft or liquid margarine and vegetable oil spread    ? Low-fat salad dressing    Other ways to decrease fat:   · Read food labels before you buy foods  Choose foods that have less than 30% of calories from fat  Choose low-fat or fat-free dairy products  Remember that fat free does not mean calorie free  These foods still contain calories, and too many calories can lead to weight gain  · Trim fat from meat and avoid fried food  Trim all visible fat from meat before you cook it  Remove the skin from poultry  Do not cervantes meat, fish, or poultry  Bake, roast, boil, or broil these foods instead  Avoid fried foods  Eat a baked potato instead of [place] fries  Steam vegetables instead of sautéing them in butter  · Add less fat to foods  Use imitation bryant bits on salads and baked potatoes instead of regular bryant bits  Use fat-free or low-fat salad dressings instead of regular dressings  Use low-fat or nonfat butter-flavored topping instead of regular butter or margarine on popcorn and other foods  Ways to decrease fat in recipes:  Replace high-fat ingredients with low-fat or nonfat ones  This may cause baked goods to be drier than usual  You may need to use nonfat cooking spray on pans to prevent food from sticking  You also may need to change the amount of other ingredients, such as water, in the recipe  Try the following:  · Use low-fat or light margarine instead of regular margarine or shortening  · Use lean ground turkey breast or chicken, or lean ground beef (less than 5% fat) instead of hamburger  · Add 1 teaspoon of canola oil to 8 ounces of skim milk instead of using cream or half and half  · Use grated zucchini, carrots, or apples in breads instead of coconut  · Use blenderized, low-fat cottage cheese, plain tofu, or low-fat ricotta cheese instead of cream cheese       · Use 1 egg white and 1 teaspoon of canola oil, or use ¼ cup (2 ounces) of fat-free egg substitute instead of a whole egg  · Replace half of the oil that is called for in a recipe with applesauce when you bake  Use 3 tablespoons of cocoa powder and 1 tablespoon of canola oil instead of a square of baking chocolate  How to increase fiber:  Eat enough high-fiber foods to get 20 to 30 grams of fiber every day  Slowly increase your fiber intake to avoid stomach cramps, gas, and other problems  · Eat 3 ounces of whole-grain foods each day  An ounce is about 1 slice of bread  Eat whole-grain breads, such as whole-wheat bread  Whole wheat, whole-wheat flour, or other whole grains should be listed as the first ingredient on the food label  Replace white flour with whole-grain flour or use half of each in recipes  Whole-grain flour is heavier than white flour, so you may have to add more yeast or baking powder  · Eat a high-fiber cereal for breakfast   Oatmeal is a good source of soluble fiber  Look for cereals that have bran or fiber in the name  Choose whole-grain products, such as brown rice, barley, and whole-wheat pasta  · Eat more beans, peas, and lentils  For example, add beans to soups or salads  Eat at least 5 cups of fruits and vegetables each day  Eat fruits and vegetables with the peel because the peel is high in fiber  © Copyright [employer] 2022 Information is for End User's use only and may not be sold, redistributed or otherwise used for commercial purposes  All illustrations and images included in CareNotes® are the copyrighted property of A D A M , Inc  or [address] [place], [place] [id number] mansi   The above information is an  only  It is not intended as medical advice for individual conditions or treatments  Talk to your doctor, nurse or pharmacist before following any medical regimen to see if it is safe and effective for you      Heart Healthy Diet   AMBULATORY CARE:   A heart healthy diet  is an eating plan low in unhealthy fats and sodium (salt)  The plan is high in healthy fats and fiber  A heart healthy diet helps improve your cholesterol levels and lowers your risk for heart disease and stroke  A dietitian will teach you how to read and understand food labels  Heart healthy diet guidelines to follow:   · Choose foods that contain healthy fats  ? Unsaturated fats  include monounsaturated and polyunsaturated fats  Unsaturated fat is found in foods such as soybean, canola, olive, corn, and safflower oils  It is also found in soft tub margarine that is made with liquid vegetable oil  ? Omega-3 fat  is found in certain fish, such as salmon, tuna, and trout, and in walnuts and flaxseed  Eat fish high in omega-3 fats at least 2 times a week  · Get 20 to 30 grams of fiber each day  Fruits, vegetables, whole-grain foods, and legumes (cooked beans) are good sources of fiber  · Limit or do not have unhealthy fats  ? Cholesterol  is found in animal foods, such as eggs and lobster, and in dairy products made from whole milk  Limit cholesterol to less than 200 mg each day  ? Saturated fat  is found in meats, such as bryant and hamburger  It is also found in chicken or turkey skin, whole milk, and butter  Limit saturated fat to less than 7% of your total daily calories  ? Trans fat  is found in packaged foods, such as potato chips and cookies  It is also in hard margarine, some fried foods, and shortening  Do not eat foods that contain trans fats  · Limit sodium as directed  You may be told to limit sodium to 2,000 to 2,300 mg each day  Choose low-sodium or no-salt-added foods  Add little or no salt to food you prepare  Use herbs and spices in place of salt  Include the following in your heart healthy plan:  Ask your dietitian or healthcare provider how many servings to have from each of the following food groups:  · Grains:      ?  Whole-wheat breads, cereals, and pastas, and Luis Cirri rice    ? Low-fat, low-sodium crackers and chips    · Vegetables:      ? Broccoli, green beans, green peas, and spinach    ? Collards, kale, and lima beans    ? Carrots, sweet potatoes, tomatoes, and peppers    ? Canned vegetables with no salt added    · Fruits:      ? Bananas, peaches, pears, and pineapple    ? Grapes, raisins, and dates    ? Oranges, tangerines, grapefruit, orange juice, and grapefruit juice    ? Apricots, mangoes, melons, and papaya    ? Raspberries and strawberries    ? Canned fruit with no added sugar    · Low-fat dairy:      ? Nonfat (skim) milk, 1% milk, and low-fat almond, cashew, or soy milks fortified with calcium    ? Low-fat cheese, regular or frozen yogurt, and cottage cheese    · Meats and proteins:      ? Lean cuts of beef and pork (loin, leg, round), skinless chicken and turkey    ? Legumes, soy products, egg whites, or nuts    Limit or do not include the following in your heart healthy plan:   · Unhealthy fats and oils:      ? Whole or 2% milk, cream cheese, sour cream, or cheese    ? High-fat cuts of beef (T-bone steaks, ribs), chicken or turkey with skin, and organ meats such as liver    ? Butter, stick margarine, shortening, and cooking oils such as coconut or palm oil    · Foods and liquids high in sodium:      ? Packaged foods, such as frozen dinners, cookies, macaroni and cheese, and cereals with more than 300 mg of sodium per serving    ? Vegetables with added sodium, such as instant potatoes, vegetables with added sauces, or regular canned vegetables    ? Cured or smoked meats, such as hot dogs, bryant, and sausage    ? High-sodium ketchup, barbecue sauce, salad dressing, pickles, olives, soy sauce, or miso    · Foods and liquids high in sugar:      ? Candy, cake, cookies, pies, or doughnuts    ? Soft drinks (soda), sports drinks, or sweetened tea    ? Canned or dry mixes for cakes, soups, sauces, or gravies    Other healthy heart guidelines:   · Do not smoke    Nicotine and other chemicals in cigarettes and cigars can cause lung and heart damage  Ask your healthcare provider for information if you currently smoke and need help to quit  E-cigarettes or smokeless tobacco still contain nicotine  Talk to your healthcare provider before you use these products  · Limit or do not drink alcohol as directed  Alcohol can damage your heart and raise your blood pressure  Your healthcare provider may give you specific daily and weekly limits  The general recommended limit is 1 drink a day for women 21 or older and for men 72 or older  Do not have more than 3 drinks in a day or 7 in a week  The recommended limit is 2 drinks a day for men 24to 59years of age  Do not have more than 4 drinks in a day or 14 in a week  A drink of alcohol is 12 ounces of beer, 5 ounces of wine, or 1½ ounces of liquor  · Exercise regularly  Exercise can help you maintain a healthy weight and improve your blood pressure and cholesterol levels  Regular exercise can also decrease your risk for heart problems  Ask your healthcare provider about the best exercise plan for you  Do not start an exercise program without asking your healthcare provider  Follow up with your doctor or cardiologist as directed:  Write down your questions so you remember to ask them during your visits  © Copyright [employer] 2022 Information is for End User's use only and may not be sold, redistributed or otherwise used for commercial purposes  All illustrations and images included in CareNotes® are the copyrighted property of [employer]  or [facility] Julia Corcoran   The above information is an  only  It is not intended as medical advice for individual conditions or treatments  Talk to your doctor, nurse or pharmacist before following any medical regimen to see if it is safe and effective for you  Calorie Counting Diet   WHAT YOU NEED TO KNOW:   What is a calorie counting diet?   It is a meal plan based on counting calories each day to reach a healthy body weight  You will need to eat fewer calories if you are trying to lose weight  Weight loss may decrease your risk for certain health problems or improve your health if you have health problems  Some of these health problems include heart disease, high blood pressure, and diabetes  What foods should I avoid? Your dietitian will tell you if you need to avoid certain foods based on your body weight and health condition  You may need to avoid high-fat foods if you are at risk for or have heart disease  You may need to eat fewer foods from the breads and starches food group if you have diabetes  How many calories are in foods? The following is a list of foods and drinks with the approximate number of calories in each  Check the food label to find the exact number of calories  A dietitian can tell you how many calories you should have from each food group each day  · Carbohydrate:      ? ½ of a 3-inch bagel, 1 slice of bread, or ½ of a hamburger bun or hot dog bun (80)    ? 1 (8-inch) flour tortilla or ½ cup of cooked rice (100)    ? 1 (6-inch) corn tortilla (80)    ? 1 (6-inch) pancake or 1 cup of bran flakes cereal (110)    ? ½ cup of cooked cereal (80)    ? ½ cup of cooked pasta (85)    ? 1 ounce of pretzels (100)    ? 3 cups of air-popped popcorn without butter or oil (80)    · Dairy:      ? 1 cup of skim or 1% milk (90)    ? 1 cup of 2% milk (120)    ? 1 cup of whole milk (160)    ? 1 cup of 2% chocolate milk (220)    ? 1 ounce of low-fat cheese with 3 grams of fat per ounce (70)    ? 1 ounce of cheddar cheese (114)    ? ½ cup of 1% fat cottage cheese (80)    ? 1 cup of plain or sugar-free, fat-free yogurt (90)    · Protein foods:      ? 3 ounces of fish (not breaded or fried) (95)    ? 3 ounces of breaded, fried fish (195)    ? ¾ cup of tuna canned in water (105)    ? 3 ounces of chicken breast without skin (105)    ? 1 fried chicken breast with skin (350)    ?  ¼ cup of fat free egg substitute (40)    ? 1 large egg (75)    ? 3 ounces of lean beef or pork (165)    ? 3 ounces of fried pork chop or ham (185)    ? ½ cup of cooked dried beans, such as kidney, barney, lentils, or navy (115)    ? 3 ounces of bologna or lunch meat (225)    ? 2 links of breakfast sausage (140)    · Vegetables:      ? ½ cup of sliced mushrooms (10)    ? 1 cup of salad greens, such as lettuce, spinach, or gila (15)    ? ½ cup of steamed asparagus (20)    ? ½ cup of cooked summer squash, zucchini squash, or green or wax beans (25)    ? 1 cup of broccoli or cauliflower florets, or 1 medium tomato (25)    ? 1 large raw carrot or ½ cup of cooked carrots (40)    ? ? of a medium cucumber or 1 stalk of celery (5)    ? 1 small baked potato (160)    ? 1 cup of breaded, fried vegetables (230)    · Fruit:      ? 1 (6-inch) banana (55)     ? ½ of a 4-inch grapefruit (55)    ? 15 grapes (60)    ? 1 medium orange or apple (70)    ? 1 large peach (65)    ? 1 cup of fresh pineapple chunks (75)    ? 1 cup of melon cubes (50)    ? 1¼ cups of whole strawberries (45)    ? ½ cup of fruit canned in juice (55)    ? ½ cup of fruit canned in heavy syrup (110)    ? ? cup of raisins (130)    ? ½ cup of unsweetened fruit juice (60)    ? ½ cup of grape, cranberry, or prune juice (90)    · Fat:      ? 10 peanuts or 2 teaspoons of peanut butter (55)    ? 2 tablespoons of avocado or 1 tablespoon of regular salad dressing (45)    ? 2 slices of bryant (90)    ? 1 teaspoon of oil, such as safflower, canola, corn, or olive oil (45)    ? 2 teaspoons of low-fat margarine, or 1 tablespoon of low-fat mayonnaise (50)    ? 1 teaspoon of regular margarine (40)    ? 1 tablespoon of regular mayonnaise (135)    ? 1 tablespoon of cream cheese or 2 tablespoons of low-fat cream cheese (45)    ? 2 tablespoons of vegetable shortening (215)    · Dessert and sweets:      ? 8 animal crackers or 5 vanilla wafers (80)    ?  1 frozen fruit juice bar (80)    ? ½ cup of ice milk or low-fat frozen yogurt (90)    ? ½ cup of sherbet or sorbet (125)    ? ½ cup of sugar-free pudding or custard (60)    ? ½ cup of ice cream (140)    ? ½ cup of pudding or custard (175)    ? 1 (2-inch) square chocolate brownie (185)    · Combination foods:      ? Bean burrito made with an 8-inch tortilla, without cheese (275)    ? Chicken breast sandwich with lettuce and tomato (325)    ? 1 cup of chicken noodle soup (60)    ? 1 beef taco (175)    ? Regular hamburger with lettuce and tomato (310)    ? Regular cheeseburger with lettuce and tomato (410)     ? ¼ of a 12-inch cheese pizza (280)    ? Fried fish sandwich with lettuce and tomato (425)    ? Hot dog and bun (275)    ? 1½ cups of macaroni and cheese (310)    ? Taco salad with a fried tortilla shell (870)    · Low-calorie foods:      ? 1 tablespoon of ketchup or 1 tablespoon of fat free sour cream (15)    ? 1 teaspoon of mustard (5)    ? ¼ cup of salsa (20)    ? 1 large dill pickle (15)    ? 1 tablespoon of fat free salad dressing (10)    ? 2 teaspoons of low-sugar, light jam or jelly, or 1 tablespoon of sugar-free syrup (15)    ? 1 sugar-free popsicle (15)    ? 1 cup of club soda, seltzer water, or diet soda (0)    CARE AGREEMENT:   You have the right to help plan your care  Discuss treatment options with your healthcare provider to decide what care you want to receive  You always have the right to refuse treatment  The above information is an  only  It is not intended as medical advice for individual conditions or treatments  Talk to your doctor, nurse or pharmacist before following any medical regimen to see if it is safe and effective for you  © Copyright [employer] 2022 Information is for End User's use only and may not be sold, redistributed or otherwise used for commercial purposes   All illustrations and images included in CareNotes® are the copyrighted property of A D A M , Inc  or [employer]

## 2022-06-02 ENCOUNTER — OFFICE VISIT (OUTPATIENT)
Dept: INTERNAL MEDICINE CLINIC | Facility: CLINIC | Age: 72
End: 2022-06-02
Payer: MEDICARE

## 2022-06-02 VITALS
HEIGHT: 70 IN | SYSTOLIC BLOOD PRESSURE: 142 MMHG | BODY MASS INDEX: 30.64 KG/M2 | OXYGEN SATURATION: 95 % | DIASTOLIC BLOOD PRESSURE: 80 MMHG | HEART RATE: 68 BPM | WEIGHT: 214 LBS | TEMPERATURE: 98.4 F

## 2022-06-02 DIAGNOSIS — M79.89 SWOLLEN FINGER: Primary | ICD-10-CM

## 2022-06-02 DIAGNOSIS — I10 BENIGN ESSENTIAL HYPERTENSION: ICD-10-CM

## 2022-06-02 PROCEDURE — 99213 OFFICE O/P EST LOW 20 MIN: CPT | Performed by: INTERNAL MEDICINE

## 2022-06-02 NOTE — PROGRESS NOTES
Assessment/Plan:    Swollen finger  Patient is a walk-in to the office today  Is complaining of swelling, erythema to distal portion of palmar surface of his 5th digit of his right hand  Denies any accident or injury  Patient on evaluation does have some swelling and erythema to the distal digit  Peripheral pulses are intact  Patient appears almost to have a blister type appearance to the area  Not tender to the touch and full range of motion to his digit without pain  Unsure the exact etiology but this seems to be benign  Patient was told to continue surveillance and if any changes to please notify the office for re-evaluation  Benign essential hypertension  Patient's blood pressure is extremely variable  Slightly elevated from his baseline today in the office but patient is very anxious about his acute problems  We will continue present medication and surveillance and he does have a follow-up appointment to recheck blood pressure and kidney function in the future  Diagnoses and all orders for this visit:    Swollen finger    Benign essential hypertension          Subjective:      Patient ID: Cameron Sherwood  is a 80 y o  male  The patient is a 26-year-old male history of medical problems as outlined previously who is being seen today acutely complaining of swelling erythema no tenderness warmth her injury to the distal portion of the 5th digit of his right hand      The following portions of the patient's history were reviewed and updated as appropriate:   He  has a past medical history of Colon polyp, DM (diabetes mellitus), type 2 ([address] ), and Hypertension    He   Patient Active Problem List    Diagnosis Date Noted    Swollen finger 06/02/2022    Class 1 obesity due to excess calories with serious comorbidity in adult 04/29/2022    Grief reaction 12/30/2021    Bronchitis 09/13/2021    Essential tremor 11/13/2020    Vitamin D deficiency 10/21/2020    Overweight 02/10/2020    Acute pain of right knee 10/08/2018    Healthcare maintenance 05/22/2018    Acute sinusitis 12/29/2017    Type 2 diabetes mellitus with hyperglycemia, without long-term current use of insulin [facility] ) 08/04/2014    Anxiety 09/20/2012    Benign essential hypertension 09/20/2012    Hyperlipidemia 09/20/2012     He  has a past surgical history that includes Knee surgery (Right) and Colonoscopy  His family history includes Diabetes unspecified in his mother  He  reports that he has never smoked  He has never used smokeless tobacco  He reports current alcohol use of about 3 0 standard drinks of alcohol per week  He reports that he does not use drugs  Current Outpatient Medications   Medication Sig Dispense Refill    aspirin 81 mg chewable tablet Chew 1 tablet daily      bisoprolol-hydrochlorothiazide (ZIAC) 5-6 25 MG per tablet Take 1 tablet by mouth daily 90 tablet 3    citalopram (CeleXA) 10 mg tablet Take 1 tablet (10 mg total) by mouth daily 90 tablet 3    fenofibrate (TRICOR) 145 mg tablet TAKE 1 TABLET (145 MG TOTAL) BY MOUTH DAILY 90 tablet 3    glimepiride (AMARYL) 4 mg tablet TAKE 1 TABLET EVERY DAY 90 tablet 3    glucose blood (ACCU-CHEK GUIDE) test strip Use to test blood sugars twice daily as instructed 100 each 1    Lancets MISC by Does not apply route 2 (two) times a day      losartan (COZAAR) 50 mg tablet Take 1 tablet (50 mg total) by mouth daily 90 tablet 3    metFORMIN (GLUCOPHAGE-XR) 500 mg 24 hr tablet TAKE 1 TABLET TWICE DAILY 180 tablet 3    sitaGLIPtin (JANUVIA) 100 mg tablet Take 1 tablet (100 mg total) by mouth daily 30 tablet 4    cholecalciferol (VITAMIN D3) 1,000 units tablet Take 1 tablet (1,000 Units total) by mouth daily (Patient not taking: No sig reported)       No current facility-administered medications for this visit       Current Outpatient Medications on File Prior to Visit   Medication Sig    aspirin 81 mg chewable tablet Chew 1 tablet daily    bisoprolol-hydrochlorothiazide (ZIAC) 5-6 25 MG per tablet Take 1 tablet by mouth daily    citalopram (CeleXA) 10 mg tablet Take 1 tablet (10 mg total) by mouth daily    fenofibrate (TRICOR) 145 mg tablet TAKE 1 TABLET (145 MG TOTAL) BY MOUTH DAILY    glimepiride (AMARYL) 4 mg tablet TAKE 1 TABLET EVERY DAY    glucose blood (ACCU-CHEK GUIDE) test strip Use to test blood sugars twice daily as instructed    Lancets MISC by Does not apply route 2 (two) times a day    losartan (COZAAR) 50 mg tablet Take 1 tablet (50 mg total) by mouth daily    metFORMIN (GLUCOPHAGE-XR) 500 mg 24 hr tablet TAKE 1 TABLET TWICE DAILY    sitaGLIPtin (JANUVIA) 100 mg tablet Take 1 tablet (100 mg total) by mouth daily    cholecalciferol (VITAMIN D3) 1,000 units tablet Take 1 tablet (1,000 Units total) by mouth daily (Patient not taking: No sig reported)    [DISCONTINUED] carbidopa-levodopa (SINEMET)  mg per tablet Take 1 tablet by mouth 2 (two) times a day (Patient taking differently: Take 1 tablet by mouth daily )     No current facility-administered medications on file prior to visit  He has No Known Allergies       Review of Systems   Constitutional: Negative  HENT: Negative  Eyes: Negative  Respiratory: Negative  Cardiovascular: Negative  Gastrointestinal: Negative  Endocrine: Negative  Genitourinary: Negative  Musculoskeletal: Positive for joint swelling  Negative for arthralgias, back pain, gait problem, myalgias, neck pain and neck stiffness  Skin: Negative  The swelling distal portion palmar surface 5th digit right hand with some erythema no warmth or pain   Allergic/Immunologic: Negative  Neurological: Negative  Hematological: Negative  Psychiatric/Behavioral: Negative            Objective:      /80   Pulse 68   Temp 98 4 °F (36 9 °C)   Ht 5' 10" (1 778 m)   Wt 97 1 kg (214 lb)   SpO2 95%   BMI 30 71 kg/m²          Physical Exam  Vitals and nursing note reviewed  Constitutional:       General: He is not in acute distress  Appearance: Normal appearance  He is obese  He is not ill-appearing, toxic-appearing or diaphoretic  Comments: Pleasant 80-year-old male who is awake alert  Obese   Eyes:      General: No scleral icterus  Right eye: No discharge  Left eye: No discharge  Extraocular Movements: Extraocular movements intact  Conjunctiva/sclera: Conjunctivae normal       Pupils: Pupils are equal, round, and reactive to light  Cardiovascular:      Rate and Rhythm: Normal rate and regular rhythm  Pulmonary:      Effort: Pulmonary effort is normal       Breath sounds: Normal breath sounds  Skin:     Comments: Swelling erythema tenderness to palpation to distal portion 5th digit right hand palmar surface  Neurological:      Mental Status: He is alert  Psychiatric:         Behavior: Behavior normal          Thought Content:  Thought content normal          Judgment: Judgment normal       Comments: A slightly anxious mood affect

## 2022-06-02 NOTE — ASSESSMENT & PLAN NOTE
Patient's blood pressure is extremely variable  Slightly elevated from his baseline today in the office but patient is very anxious about his acute problems  We will continue present medication and surveillance and he does have a follow-up appointment to recheck blood pressure and kidney function in the future

## 2022-06-02 NOTE — ASSESSMENT & PLAN NOTE
Patient is a walk-in to the office today  Is complaining of swelling, erythema to distal portion of palmar surface of his 5th digit of his right hand  Denies any accident or injury  Patient on evaluation does have some swelling and erythema to the distal digit  Peripheral pulses are intact  Patient appears almost to have a blister type appearance to the area  Not tender to the touch and full range of motion to his digit without pain  Unsure the exact etiology but this seems to be benign  Patient was told to continue surveillance and if any changes to please notify the office for re-evaluation

## 2022-06-21 ENCOUNTER — HOSPITAL ENCOUNTER (EMERGENCY)
Facility: HOSPITAL | Age: 72
Discharge: HOME | End: 2022-06-21
Attending: EMERGENCY MEDICINE
Payer: MEDICARE

## 2022-06-21 VITALS
WEIGHT: 214 LBS | SYSTOLIC BLOOD PRESSURE: 191 MMHG | TEMPERATURE: 97.8 F | RESPIRATION RATE: 19 BRPM | DIASTOLIC BLOOD PRESSURE: 102 MMHG | HEART RATE: 63 BPM | OXYGEN SATURATION: 94 % | BODY MASS INDEX: 30.71 KG/M2

## 2022-06-21 DIAGNOSIS — L03.011 FELON OF FINGER OF RIGHT HAND: Primary | ICD-10-CM

## 2022-06-21 PROCEDURE — 99284 EMERGENCY DEPT VISIT MOD MDM: CPT | Performed by: EMERGENCY MEDICINE

## 2022-06-21 PROCEDURE — 10060 I&D ABSCESS SIMPLE/SINGLE: CPT | Performed by: EMERGENCY MEDICINE

## 2022-06-21 PROCEDURE — 99283 EMERGENCY DEPT VISIT LOW MDM: CPT

## 2022-06-21 RX ORDER — ROPIVACAINE HYDROCHLORIDE 5 MG/ML
15 INJECTION, SOLUTION EPIDURAL; INFILTRATION; PERINEURAL ONCE
Status: COMPLETED | OUTPATIENT
Start: 2022-06-21 | End: 2022-06-21

## 2022-06-21 RX ORDER — SULFAMETHOXAZOLE AND TRIMETHOPRIM 800; 160 MG/1; MG/1
1 TABLET ORAL ONCE
Status: COMPLETED | OUTPATIENT
Start: 2022-06-21 | End: 2022-06-21

## 2022-06-21 RX ORDER — SULFAMETHOXAZOLE AND TRIMETHOPRIM 800; 160 MG/1; MG/1
1 TABLET ORAL 2 TIMES DAILY
Qty: 14 TABLET | Refills: 0 | Status: SHIPPED | OUTPATIENT
Start: 2022-06-21 | End: 2022-06-28

## 2022-06-21 RX ADMIN — SULFAMETHOXAZOLE AND TRIMETHOPRIM 1 TABLET: 800; 160 TABLET ORAL at 11:08

## 2022-06-21 RX ADMIN — ROPIVACAINE HYDROCHLORIDE 15 ML: 5 INJECTION EPIDURAL; INFILTRATION; PERINEURAL at 10:16

## 2022-06-21 NOTE — ED PROVIDER NOTES
History  Chief Complaint   Patient presents with    Finger Swelling     Pt reports swelling and blister to R 5th finger; pt was seen by family doctor a few weeks ago for same; pt denies pain; concerned he may have splinter      71 M with a PMHx of DM who presents for swelling to R 5th digit  Patient describes this has been going on for the past 3 weeks or so  He was seen early in the month for right finger swelling, states it has not gotten worse since then  At the time, it did not appear acutely infected, and he is advised to continue to monitor his symptoms  He does not describe any changes since then  However, he wanted a 2nd opinion  He denies any fevers, no difficulty moving the finger, no pain in the finger  No worsening redness or swelling  ROS otherwise negative  Prior to Admission Medications   Prescriptions Last Dose Informant Patient Reported? Taking?    Lancets MISC   Yes No   Sig: by Does not apply route 2 (two) times a day   aspirin 81 mg chewable tablet   Yes No   Sig: Chew 1 tablet daily   bisoprolol-hydrochlorothiazide (ZIAC) 5-6 25 MG per tablet   No No   Sig: Take 1 tablet by mouth daily   cholecalciferol (VITAMIN D3) 1,000 units tablet   No No   Sig: Take 1 tablet (1,000 Units total) by mouth daily   Patient not taking: No sig reported   citalopram (CeleXA) 10 mg tablet   No No   Sig: Take 1 tablet (10 mg total) by mouth daily   fenofibrate (TRICOR) 145 mg tablet   No No   Sig: TAKE 1 TABLET (145 MG TOTAL) BY MOUTH DAILY   glimepiride (AMARYL) 4 mg tablet   No No   Sig: TAKE 1 TABLET EVERY DAY   glucose blood (ACCU-CHEK GUIDE) test strip   No No   Sig: Use to test blood sugars twice daily as instructed   losartan (COZAAR) 50 mg tablet   No No   Sig: Take 1 tablet (50 mg total) by mouth daily   metFORMIN (GLUCOPHAGE-XR) 500 mg 24 hr tablet   No No   Sig: TAKE 1 TABLET TWICE DAILY   sitaGLIPtin (JANUVIA) 100 mg tablet   No No   Sig: Take 1 tablet (100 mg total) by mouth daily Facility-Administered Medications: None       Past Medical History:   Diagnosis Date    Colon polyp     DM (diabetes mellitus), type 2 ([facility] )     Hypertension        Past Surgical History:   Procedure Laterality Date    COLONOSCOPY      KNEE SURGERY Right        Family History   Problem Relation Age of Onset    Diabetes unspecified Mother      I have reviewed and agree with the history as documented  E-Cigarette/Vaping    E-Cigarette Use Never User      E-Cigarette/Vaping Substances     Social History     Tobacco Use    Smoking status: Never Smoker    Smokeless tobacco: Never Used   Vaping Use    Vaping Use: Never used   Substance Use Topics    Alcohol use: Yes     Alcohol/week: 3 0 standard drinks     Types: 3 Cans of beer per week    Drug use: Never        Review of Systems   Constitutional: Negative for chills, diaphoresis, fatigue and fever  HENT: Negative for congestion and sore throat  Eyes: Negative for visual disturbance  Respiratory: Negative for cough, chest tightness and shortness of breath  Cardiovascular: Negative for chest pain, palpitations and leg swelling  Gastrointestinal: Negative for abdominal distention, abdominal pain, constipation, diarrhea, nausea and vomiting  Genitourinary: Negative for difficulty urinating and dysuria  Musculoskeletal: Negative for arthralgias and myalgias  Finger swelling  Skin: Negative for rash  Neurological: Negative for dizziness, weakness, light-headedness, numbness and headaches  Psychiatric/Behavioral: Negative for agitation, behavioral problems and confusion  The patient is not nervous/anxious  All other systems reviewed and are negative        Physical Exam  ED Triage Vitals [06/21/22 0947]   Temperature Pulse Respirations Blood Pressure SpO2   97 8 °F (36 6 °C) 63 19 (!) 191/102 94 %      Temp Source Heart Rate Source Patient Position - Orthostatic VS BP Location FiO2 (%)   Oral Monitor Sitting Right arm -- Pain Score       No Pain             Orthostatic Vital Signs  Vitals:    06/21/22 0947   BP: (!) 191/102   Pulse: 63   Patient Position - Orthostatic VS: Sitting       Physical Exam  Vitals and nursing note reviewed  Constitutional:       Appearance: He is well-developed  HENT:      Head: Normocephalic and atraumatic  Eyes:      Conjunctiva/sclera: Conjunctivae normal    Cardiovascular:      Rate and Rhythm: Normal rate and regular rhythm  Heart sounds: No murmur heard  Pulmonary:      Effort: Pulmonary effort is normal  No respiratory distress  Breath sounds: Normal breath sounds  Abdominal:      Palpations: Abdomen is soft  Tenderness: There is no abdominal tenderness  Musculoskeletal:         General: Swelling present  No tenderness, deformity or signs of injury  Cervical back: Neck supple  Comments: Patient's finger is somewhat red and swollen along the volar surface of the right distal 5th phalanx  It is nontender despite deep palpation  Patient has no difficulty moving the finger, normal sensation  Skin:     General: Skin is warm and dry  Neurological:      Mental Status: He is alert  ED Medications  Medications   ropivacaine (NAROPIN) 0 5 % injection 15 mL (15 mL Infiltration Given by Other 6/21/22 1016)   sulfamethoxazole-trimethoprim (BACTRIM DS) 800-160 mg per tablet 1 tablet (1 tablet Oral Given 6/21/22 1108)       Diagnostic Studies  Results Reviewed     None                 No orders to display         Procedures  Incision and drain    Date/Time: 6/21/2022 11:06 AM  Performed by: Dusty Christian MD  Authorized by: Dusty Christian MD   Universal Protocol:  Consent: Verbal consent obtained    Consent given by: patient  Patient understanding: patient states understanding of the procedure being performed  Patient identity confirmed: verbally with patient      Patient location:  ED  Location:     Type:  Fluid collection    Size:  1    Location: Upper extremity    Upper extremity location:  R small finger  Pre-procedure details:     Skin preparation:  Antiseptic wash  Anesthesia (see MAR for exact dosages): Anesthesia method:  Nerve block    Block anesthetic: Ropivicaine  Block technique:  Digital block    Block injection procedure:  Anatomic landmarks identified, introduced needle, incremental injection, negative aspiration for blood and anatomic landmarks palpated    Block outcome:  Anesthesia achieved  Procedure details:     Complexity:  Simple    Needle aspiration: no      Incision types:  Stab incision    Scalpel blade:  11    Approach:  Puncture    Incision depth:  Superficial    Drainage:  Bloody and serous    Drainage amount:  Copious (Copious yellow-saundra clear, viscous discharge)    Packing materials:  1/4 in gauze  Post-procedure details:     Patient tolerance of procedure: Tolerated well, no immediate complications          ED Course                                       MDM  Number of Diagnoses or Management Options  Felon of finger of right hand  Diagnosis management comments: Ultrasound examination demonstrated fluid within the septae of the finger  See procedure note, drainage was performed with clear yellowish, viscous material   Using ultrasound, confirmed that material was completely evacuated  Patient is able to range finger after procedure was performed  Patient discharged with strict return precautions, advised hand surgery follow-up within the next 3 days, started on Bactrim with 1st dose in the emergency department and script for 7 days        Disposition  Final diagnoses:   Felon of finger of right hand     Time reflects when diagnosis was documented in both MDM as applicable and the Disposition within this note     Time User Action Codes Description Comment    6/21/2022 11:09 AM Josh Brown Add [G07 [id number]] Felon of finger of right hand       ED Disposition     ED Disposition   Discharge    Condition   Stable Date/Time   Tue Jun 21, 2022 11:09 AM    Comment   Brittnee Plata  discharge to home/self care                 Follow-up Information     Follow up With Specialties Details Why [address] [facility], DO Internal Medicine Call   [address]  [address] [facility] [place] [address]  [phone]      Nathan Villanueva MD Orthopedic Surgery, Hand Surgery Schedule an appointment as soon as possible for a visit in 3 days  [address]  [phone]            Discharge Medication List as of 6/21/2022 11:30 AM      START taking these medications    Details   sulfamethoxazole-trimethoprim (BACTRIM DS) 800-160 mg per tablet Take 1 tablet by mouth 2 (two) times a day for 7 days smx-tmp DS (BACTRIM) 800-160 mg tabs (1tab q12 D10), Starting Tue 6/21/2022, Until Tue 6/28/2022, Normal         CONTINUE these medications which have NOT CHANGED    Details   aspirin 81 mg chewable tablet Chew 1 tablet daily, Starting Thu 12/6/2012, Historical Med      bisoprolol-hydrochlorothiazide (ZIAC) 5-6 25 MG per tablet Take 1 tablet by mouth daily, Starting Mon 3/28/2022, Normal      cholecalciferol (VITAMIN D3) 1,000 units tablet Take 1 tablet (1,000 Units total) by mouth daily, Starting Wed 10/21/2020, No Print      citalopram (CeleXA) 10 mg tablet Take 1 tablet (10 mg total) by mouth daily, Starting Mon 3/28/2022, Normal      fenofibrate (TRICOR) 145 mg tablet TAKE 1 TABLET (145 MG TOTAL) BY MOUTH DAILY, Starting Wed 2/9/2022, Normal      glimepiride (AMARYL) 4 mg tablet TAKE 1 TABLET EVERY DAY, Normal      glucose blood (ACCU-CHEK GUIDE) test strip Use to test blood sugars twice daily as instructed, Normal      Lancets MISC by Does not apply route 2 (two) times a day, Starting Fri 10/6/2017, Historical Med      losartan (COZAAR) 50 mg tablet Take 1 tablet (50 mg total) by mouth daily, Starting Mon 3/28/2022, Normal      metFORMIN (GLUCOPHAGE-XR) 500 mg 24 hr tablet TAKE 1 TABLET TWICE DAILY, Normal sitaGLIPtin (JANUVIA) 100 mg tablet Take 1 tablet (100 mg total) by mouth daily, Starting Thu 12/30/2021, Normal           No discharge procedures on file  PDMP Review     None           ED Provider  Attending physically available and evaluated [facility]    I managed the patient along with the ED Attending      Electronically Signed by         Hayder Gomez MD  06/21/22 5186

## 2022-06-21 NOTE — ED ATTENDING ATTESTATION
Final Diagnosis:  1  Felon of finger of right hand      ED Course as of 06/21/22 1109   e Jun 21, 2022   1108 Felon successfully drained  Will start Hannah Charlton MD, saw and evaluated the patient  All available labs and X-rays were ordered by me or the resident and have been reviewed by myself  I discussed the patient with the resident / non-physician and agree with the resident's / non-physician practitioner's findings and plan as documented in the resident's / non-physician practicitioner's note, except where noted  At this point, I agree with the current assessment done in the ED  I was present during key portions of all procedures performed unless otherwise stated  Chief Complaint   Patient presents with    Finger Swelling     Pt reports swelling and blister to R 5th finger; pt was seen by family doctor a few weeks ago for same; pt denies pain; concerned he may have splinter      This is a 71 y o  male presenting for evaluation of 2 weeks of RIGHT 5th finger pain/swelling  It hasn't grown, stayed the same size  RIGHT 5th finger  No f/ch/n/v     General: VS reviewed  Appears in NAD  awake, alert  Well-nourished, well-developed  Appears stated age  Speaking normally in full sentences  Head: Normocephalic, atraumatic  Eyes: EOM-I  No diplopia  No hyphema  No subconjunctival hemorrhages  Symmetrical lids  ENT: Atraumatic external nose and ears  MMM  No malocclusion  No stridor  Normal phonation  No drooling  Normal swallowing  Neck: No JVD  CV: No pallor noted  Lungs:   No tachypnea  No respiratory distress  MSK:   FROM spontaneously  5th figner pad is swoollen, tender, minimal redness  Skin: Dry, intact  Neuro: Awake, alert, GCS15, CN II-XII grossly intact  Motor grossly intact    Psychiatric/Behavioral: Appropriate mood and affect   Exam: deferred    A/P:  - did an U/S with resident ? dax noted  - antibiotics  - f/u ortho    PMH:   has a past medical history of Colon polyp, DM (diabetes mellitus), type 2 ([address] ), and Hypertension  PSH:   has a past surgical history that includes Knee surgery (Right) and Colonoscopy  Social:  Social History     Substance and Sexual Activity   Alcohol Use Yes    Alcohol/week: 3 0 standard drinks    Types: 3 Cans of beer per week     Social History     Tobacco Use   Smoking Status Never Smoker   Smokeless Tobacco Never Used     Social History     Substance and Sexual Activity   Drug Use Never     PE:  Vitals:    06/21/22 0947   BP: (!) 191/102   BP Location: Right arm   Pulse: 63   Resp: 19   Temp: 97 8 °F (36 6 °C)   TempSrc: Oral   SpO2: 94%   Weight: 97 1 kg (214 lb)     - 13 point ROS was performed and all are normal unless stated in the history above  - Nursing note reviewed  Vitals reviewed  - Orders placed by myself and/or advanced practitioner / resident     - Previous chart was reviewed  - No language barrier    - History obtained from patient  - There are no limitations to the history obtained  - Critical care time: Not applicable for this patient       Code Status: No Order  Advance Directive and Living Will:      Power of :    POLST:      Medications   ropivacaine (NAROPIN) 0 5 % injection 15 mL (15 mL Infiltration Given by Other 6/21/22 1016)   sulfamethoxazole-trimethoprim (BACTRIM DS) 800-160 mg per tablet 1 tablet (1 tablet Oral Given 6/21/22 1108)     No orders to display     Orders Placed This Encounter   Procedures    Incision and Drainage     Labs Reviewed - No data to display  Time reflects when diagnosis was documented in both MDM as applicable and the Disposition within this note     Time User Action Codes Description Comment    6/21/2022 11:09 AM Ariel Recinos Add [A30 510] Felon of finger of right hand       ED Disposition     ED Disposition   Discharge    Condition   Stable    Date/Time   Tue Jun 21, 2022 11:09 AM    Comment   Mauricio Neumann  discharge to home/self care                Follow-up Information    None       Patient's Medications   Discharge Prescriptions    No medications on file     No discharge procedures on file  Prior to Admission Medications   Prescriptions Last Dose Informant Patient Reported? Taking? Lancets MISC   Yes No   Sig: by Does not apply route 2 (two) times a day   aspirin 81 mg chewable tablet   Yes No   Sig: Chew 1 tablet daily   bisoprolol-hydrochlorothiazide (ZIAC) 5-6 25 MG per tablet   No No   Sig: Take 1 tablet by mouth daily   cholecalciferol (VITAMIN D3) 1,000 units tablet   No No   Sig: Take 1 tablet (1,000 Units total) by mouth daily   Patient not taking: No sig reported   citalopram (CeleXA) 10 mg tablet   No No   Sig: Take 1 tablet (10 mg total) by mouth daily   fenofibrate (TRICOR) 145 mg tablet   No No   Sig: TAKE 1 TABLET (145 MG TOTAL) BY MOUTH DAILY   glimepiride (AMARYL) 4 mg tablet   No No   Sig: TAKE 1 TABLET EVERY DAY   glucose blood (ACCU-CHEK GUIDE) test strip   No No   Sig: Use to test blood sugars twice daily as instructed   losartan (COZAAR) 50 mg tablet   No No   Sig: Take 1 tablet (50 mg total) by mouth daily   metFORMIN (GLUCOPHAGE-XR) 500 mg 24 hr tablet   No No   Sig: TAKE 1 TABLET TWICE DAILY   sitaGLIPtin (JANUVIA) 100 mg tablet   No No   Sig: Take 1 tablet (100 mg total) by mouth daily      Facility-Administered Medications: None       Portions of the record may have been created with voice recognition software  Occasional wrong word or "sound a like" substitutions may have occurred due to the inherent limitations of voice recognition software  Read the chart carefully and recognize, using context, where substitutions have occurred      Electronically signed by:  Ricardo Kahn

## 2022-06-21 NOTE — DISCHARGE INSTRUCTIONS
Please follow all return precautions  Follow up with hand surgery within the next several days  Take antibiotics as prescribed  Thank you

## 2022-06-27 DIAGNOSIS — F41.9 ANXIETY: ICD-10-CM

## 2022-06-27 DIAGNOSIS — I10 ESSENTIAL HYPERTENSION: ICD-10-CM

## 2022-06-27 RX ORDER — BISOPROLOL FUMARATE AND HYDROCHLOROTHIAZIDE 5; 6.25 MG/1; MG/1
1 TABLET ORAL DAILY
Qty: 90 TABLET | Refills: 3 | Status: SHIPPED | OUTPATIENT
Start: 2022-06-27

## 2022-06-27 RX ORDER — CITALOPRAM 10 MG/1
10 TABLET ORAL DAILY
Qty: 90 TABLET | Refills: 3 | Status: SHIPPED | OUTPATIENT
Start: 2022-06-27

## 2022-06-27 RX ORDER — LOSARTAN POTASSIUM 50 MG/1
50 TABLET ORAL DAILY
Qty: 90 TABLET | Refills: 3 | Status: SHIPPED | OUTPATIENT
Start: 2022-06-27

## 2022-08-30 ENCOUNTER — APPOINTMENT (OUTPATIENT)
Dept: LAB | Facility: CLINIC | Age: 72
End: 2022-08-30
Payer: MEDICARE

## 2022-08-30 DIAGNOSIS — E11.65 TYPE 2 DIABETES MELLITUS WITH HYPERGLYCEMIA, WITHOUT LONG-TERM CURRENT USE OF INSULIN (HCC): ICD-10-CM

## 2022-08-30 DIAGNOSIS — I10 BENIGN ESSENTIAL HYPERTENSION: ICD-10-CM

## 2022-08-30 DIAGNOSIS — E78.2 MIXED HYPERLIPIDEMIA: ICD-10-CM

## 2022-08-30 LAB
ANION GAP SERPL CALCULATED.3IONS-SCNC: 7 MMOL/L (ref 4–13)
BUN SERPL-MCNC: 15 MG/DL (ref 5–25)
CALCIUM SERPL-MCNC: 9.2 MG/DL (ref 8.3–10.1)
CHLORIDE SERPL-SCNC: 109 MMOL/L (ref 96–108)
CHOLEST SERPL-MCNC: 158 MG/DL
CO2 SERPL-SCNC: 24 MMOL/L (ref 21–32)
CREAT SERPL-MCNC: 1.07 MG/DL (ref 0.6–1.3)
EST. AVERAGE GLUCOSE BLD GHB EST-MCNC: 203 MG/DL
GFR SERPL CREATININE-BSD FRML MDRD: 69 ML/MIN/1.73SQ M
GLUCOSE P FAST SERPL-MCNC: 152 MG/DL (ref 65–99)
HBA1C MFR BLD: 8.7 %
HDLC SERPL-MCNC: 34 MG/DL
LDLC SERPL CALC-MCNC: 96 MG/DL (ref 0–100)
NONHDLC SERPL-MCNC: 124 MG/DL
POTASSIUM SERPL-SCNC: 4.1 MMOL/L (ref 3.5–5.3)
SODIUM SERPL-SCNC: 140 MMOL/L (ref 135–147)
TRIGL SERPL-MCNC: 142 MG/DL

## 2022-08-30 PROCEDURE — 83036 HEMOGLOBIN GLYCOSYLATED A1C: CPT

## 2022-08-30 PROCEDURE — 80048 BASIC METABOLIC PNL TOTAL CA: CPT

## 2022-08-30 PROCEDURE — 80061 LIPID PANEL: CPT

## 2022-08-30 PROCEDURE — 36415 COLL VENOUS BLD VENIPUNCTURE: CPT

## 2022-09-08 ENCOUNTER — OFFICE VISIT (OUTPATIENT)
Dept: INTERNAL MEDICINE CLINIC | Facility: CLINIC | Age: 72
End: 2022-09-08
Payer: MEDICARE

## 2022-09-08 VITALS
RESPIRATION RATE: 18 BRPM | BODY MASS INDEX: 30.06 KG/M2 | OXYGEN SATURATION: 95 % | HEART RATE: 60 BPM | HEIGHT: 70 IN | WEIGHT: 210 LBS | DIASTOLIC BLOOD PRESSURE: 80 MMHG | TEMPERATURE: 97 F | SYSTOLIC BLOOD PRESSURE: 136 MMHG

## 2022-09-08 DIAGNOSIS — Z00.00 HEALTHCARE MAINTENANCE: ICD-10-CM

## 2022-09-08 DIAGNOSIS — I10 BENIGN ESSENTIAL HYPERTENSION: ICD-10-CM

## 2022-09-08 DIAGNOSIS — E11.65 TYPE 2 DIABETES MELLITUS WITH HYPERGLYCEMIA, WITHOUT LONG-TERM CURRENT USE OF INSULIN (HCC): ICD-10-CM

## 2022-09-08 DIAGNOSIS — E66.09 CLASS 1 OBESITY DUE TO EXCESS CALORIES WITH SERIOUS COMORBIDITY AND BODY MASS INDEX (BMI) OF 30.0 TO 30.9 IN ADULT: Primary | ICD-10-CM

## 2022-09-08 DIAGNOSIS — E78.2 MIXED HYPERLIPIDEMIA: ICD-10-CM

## 2022-09-08 PROCEDURE — G0439 PPPS, SUBSEQ VISIT: HCPCS | Performed by: INTERNAL MEDICINE

## 2022-09-08 PROCEDURE — 99214 OFFICE O/P EST MOD 30 MIN: CPT | Performed by: INTERNAL MEDICINE

## 2022-09-08 NOTE — ASSESSMENT & PLAN NOTE
Patient's blood pressure is showing adequate control with present treatment  Patient will continue present medication and surveillance  We will continue to also monitor his renal function which has been stable

## 2022-09-08 NOTE — PROGRESS NOTES
Assessment and Plan:     Problem List Items Addressed This Visit    None          Preventive health issues were discussed with patient, and age appropriate screening tests were ordered as noted in patient's After Visit Summary  Personalized health advice and appropriate referrals for health education or preventive services given if needed, as noted in patient's After Visit Summary  History of Present Illness:     Patient presents for a Medicare Wellness Visit    HPI   Patient Care Team:  Monroe Baum DO as PCP - Emmanuel Arellano MD as PCP - Endocrinology (Endocrinology)  Mina Marie MD     Review of Systems:     Review of Systems     Problem List:     Patient Active Problem List   Diagnosis    Anxiety    Benign essential hypertension    Type 2 diabetes mellitus with hyperglycemia, without long-term current use of insulin ([address] )    Hyperlipidemia    Healthcare maintenance    Acute pain of right knee    Acute sinusitis    Overweight    Vitamin D deficiency    Essential tremor    Bronchitis    Grief reaction    Class 1 obesity due to excess calories with serious comorbidity in adult    Swollen finger      Past Medical and Surgical History:     Past Medical History:   Diagnosis Date    Colon polyp     DM (diabetes mellitus), type 2 ([address] )     Hypertension      Past Surgical History:   Procedure Laterality Date    COLONOSCOPY      KNEE SURGERY Right       Family History:     Family History   Problem Relation Age of Onset    Diabetes unspecified Mother       Social History:     Social History     Socioeconomic History    Marital status: /Civil Union     Spouse name: None    Number of children: None    Years of education: None    Highest education level: None   Occupational History    None   Tobacco Use    Smoking status: Never Smoker    Smokeless tobacco: Never Used   Vaping Use    Vaping Use: Never used   Substance and Sexual Activity    Alcohol use:  Yes Alcohol/week: 3 0 standard drinks     Types: 3 Cans of beer per week    Drug use: Never    Sexual activity: None   Other Topics Concern    None   Social History Narrative    None     Social Determinants of Health     Financial Resource Strain: Not on file   Food Insecurity: Not on file   Transportation Needs: Not on file   Physical Activity: Not on file   Stress: Not on file   Social Connections: Not on file   Intimate Partner Violence: Not on file   Housing Stability: Not on file      Medications and Allergies:     Current Outpatient Medications   Medication Sig Dispense Refill    aspirin 81 mg chewable tablet Chew 1 tablet daily      bisoprolol-hydrochlorothiazide (ZIAC) 5-6 25 MG per tablet Take 1 tablet by mouth daily 90 tablet 3    cholecalciferol (VITAMIN D3) 1,000 units tablet Take 1 tablet (1,000 Units total) by mouth daily      citalopram (CeleXA) 10 mg tablet Take 1 tablet (10 mg total) by mouth daily 90 tablet 3    fenofibrate (TRICOR) 145 mg tablet TAKE 1 TABLET (145 MG TOTAL) BY MOUTH DAILY 90 tablet 3    glimepiride (AMARYL) 4 mg tablet TAKE 1 TABLET EVERY DAY 90 tablet 3    glucose blood (ACCU-CHEK GUIDE) test strip Use to test blood sugars twice daily as instructed 100 each 1    Lancets MISC by Does not apply route 2 (two) times a day      losartan (COZAAR) 50 mg tablet Take 1 tablet (50 mg total) by mouth daily 90 tablet 3    metFORMIN (GLUCOPHAGE-XR) 500 mg 24 hr tablet TAKE 1 TABLET TWICE DAILY 180 tablet 3    sitaGLIPtin (JANUVIA) 100 mg tablet Take 1 tablet (100 mg total) by mouth daily 30 tablet 4     No current facility-administered medications for this visit       No Known Allergies   Immunizations:     Immunization History   Administered Date(s) Administered    COVID-19 MODERNA VACC 0 5 ML IM 03/19/2021    Influenza Quadrivalent Preservative Free 3 years and older IM 09/29/2014    Influenza Quadrivalent, 6-35 Months IM 09/18/2015    Influenza Split High Dose Preservative Free IM 10/19/2016, 09/15/2017    Influenza, high dose seasonal 0 7 mL 10/07/2019, 11/13/2020, 10/21/2021    Influenza, injectable, quadrivalent, preservative free 0 5 mL 09/29/2018    Influenza, seasonal, injectable 09/20/2012, 09/24/2013    Pneumococcal Conjugate 13-Valent 10/19/2016      Health Maintenance:         Topic Date Due    Hepatitis C Screening  Never done    Colorectal Cancer Screening  10/12/2026         Topic Date Due    Pneumococcal Vaccine: 65+ Years (2 - PPSV23 or PCV20) 10/19/2017    COVID-19 Vaccine (2 - Moderna series) 04/16/2021    Influenza Vaccine (1) 09/01/2022      Medicare Screening Tests and Risk Assessments:     Sona Haji is here for his Subsequent Wellness visit  Last Medicare Wellness visit information reviewed, patient interviewed and updates made to the record today  Health Risk Assessment:   Patient rates overall health as good  Patient feels that their physical health rating is same  Patient is very satisfied with their life  Eyesight was rated as same  Hearing was rated as same  Patient feels that their emotional and mental health rating is much better  Patients states they are never, rarely angry  Patient states they are never, rarely unusually tired/fatigued  Pain experienced in the last 7 days has been none  Patient states that he has experienced no weight loss or gain in last 6 months  Fall Risk Screening: In the past year, patient has experienced: no history of falling in past year      Home Safety:  Patient does not have trouble with stairs inside or outside of their home  Patient has working smoke alarms and has working carbon monoxide detector  Home safety hazards include: none  Nutrition:   Current diet is Regular  Medications:   Patient is not currently taking any over-the-counter supplements  Patient is able to manage medications       Activities of Daily Living (ADLs)/Instrumental Activities of Daily Living (IADLs):   Walk and transfer into and out of bed and chair?: Yes  Dress and groom yourself?: Yes    Bathe or shower yourself?: Yes    Feed yourself? Yes  Do your laundry/housekeeping?: Yes  Manage your money, pay your bills and track your expenses?: Yes  Make your own meals?: Yes    Do your own shopping?: Yes    Previous Hospitalizations:   Any hospitalizations or ED visits within the last 12 months?: No      Advance Care Planning:   Living will: Yes    Durable POA for healthcare: Yes    Advanced directive: Yes    Five wishes given: No      PREVENTIVE SCREENINGS      Cardiovascular Screening:    General: Screening Not Indicated and History Lipid Disorder      Diabetes Screening:     General: Screening Not Indicated and History Diabetes      Colorectal Cancer Screening:     General: Screening Current      Prostate Cancer Screening:    General: Screening Current      Abdominal Aortic Aneurysm (AAA) Screening:    Risk factors include: age between 73-69 yo        Lung Cancer Screening:     General: Screening Not Indicated    Screening, Brief Intervention, and Referral to Treatment (SBIRT)    Screening  Typical number of drinks in a day: 0  Typical number of drinks in a week: 0  Interpretation: Low risk drinking behavior      AUDIT-C Screenin) How often did you have a drink containing alcohol in the past year? never  2) How many drinks did you have on a typical day when you were drinking in the past year? 0  3) How often did you have 6 or more drinks on one occasion in the past year? never    AUDIT-C Score: 0  Interpretation: Score 0-3 (male): Negative screen for alcohol misuse    Single Item Drug Screening:  How often have you used an illegal drug (including marijuana) or a prescription medication for non-medical reasons in the past year? never    Single Item Drug Screen Score: 0  Interpretation: Negative screen for possible drug use disorder    No exam data present     Physical Exam:     /80 (BP Location: Left arm, Patient Position: Sitting, Cuff Size: Adult)   Pulse 60   Temp (!) 97 °F (36 1 °C) (Tympanic)   Resp 18   Ht 5' 10" (1 778 m)   Wt 95 3 kg (210 lb)   SpO2 95%   BMI 30 13 kg/m²     Physical Exam     Jaja Link,

## 2022-09-08 NOTE — ASSESSMENT & PLAN NOTE
With the patient's BMI he is considered obese  He has lost approximately 4 lb since his last visit he is commended for this  We did discuss the importance of looking very closely at the amount of calories she is taking in on a daily basis and ways the can reduce this in order to promote further weight loss  We also discussed with the patient the importance of some type of routine daily exercise program even if just a walking program in order to burn off calories and lose weight

## 2022-09-08 NOTE — ASSESSMENT & PLAN NOTE
The patient did have labs performed and as noted hemoglobin A1c is now to 8 7  We know in the past on the patient is more conscientious as far as his dietary intake that his sugars will go down and he will have better control  The he states he does have problems snacking at nighttime and not watching his caloric intake although he is trying to avoid concentrated sweets and simple carbohydrates  He is up-to-date with diabetic eye exam, diabetic foot exam was performed today  I did discuss with the patient that if he continues to show poor control we may need instead to treatment with insulin    Patient will return to the office in 4 months remind patient the importance of influenza vaccine and new coronavirus vaccine  Lab Results   Component Value Date    HGBA1C 8 7 (H) 08/30/2022

## 2022-09-08 NOTE — ASSESSMENT & PLAN NOTE
Patient is 44-year-old male history diabetes mellitus type 2, hyperlipidemia, hypertension  The patient is here today for repeat Medicare wellness visit  He did have labs performed prior to the visit today we did discuss the results  Patient states in general he is doing relatively well physically but emotionally still is having problems and he finds it difficult to be living alone  He remains physically active and is spending time with his family  He denies any chest pain or pressure no increasing shortness breath with exertion  States his appetite is robust   Denies any bowel or bladder problems recent colonoscopy with no significant findings  Patient did undergo physical exam today in the office with no acute abnormalities found on examination  We did discuss with the patient today the importance of trying to reduce his caloric intake especially of carbohydrates in order to not only lose weight but keep her sugar under control  The importance of staying physically active  We will check a fasting blood sugar hemoglobin A1c with his next visit

## 2022-09-08 NOTE — ASSESSMENT & PLAN NOTE
Patient does have a history of hyperlipidemia  Because of intolerance to statins he remains on fenofibrate 145 mg daily  He admits that he is not perfect with his diet especially he is eating a lot of lunch meat, processed meats on a regular basis    We will continue to monitor his cholesterol profile

## 2022-09-08 NOTE — PROGRESS NOTES
Assessment/Plan:    Healthcare maintenance  Patient is 43-year-old male history diabetes mellitus type 2, hyperlipidemia, hypertension  The patient is here today for repeat Medicare wellness visit  He did have labs performed prior to the visit today we did discuss the results  Patient states in general he is doing relatively well physically but emotionally still is having problems and he finds it difficult to be living alone  He remains physically active and is spending time with his family  He denies any chest pain or pressure no increasing shortness breath with exertion  States his appetite is robust   Denies any bowel or bladder problems recent colonoscopy with no significant findings  Patient did undergo physical exam today in the office with no acute abnormalities found on examination  We did discuss with the patient today the importance of trying to reduce his caloric intake especially of carbohydrates in order to not only lose weight but keep her sugar under control  The importance of staying physically active  We will check a fasting blood sugar hemoglobin A1c with his next visit  Type 2 diabetes mellitus with hyperglycemia, without long-term current use of insulin (HCC)  The patient did have labs performed and as noted hemoglobin A1c is now to 8 7  We know in the past on the patient is more conscientious as far as his dietary intake that his sugars will go down and he will have better control  The he states he does have problems snacking at nighttime and not watching his caloric intake although he is trying to avoid concentrated sweets and simple carbohydrates  He is up-to-date with diabetic eye exam, diabetic foot exam was performed today  I did discuss with the patient that if he continues to show poor control we may need instead to treatment with insulin    Patient will return to the office in 4 months remind patient the importance of influenza vaccine and new coronavirus vaccine  Lab Results   Component Value Date    HGBA1C 8 7 (H) 08/30/2022       Class 1 obesity due to excess calories with serious comorbidity in adult  With the patient's BMI he is considered obese  He has lost approximately 4 lb since his last visit he is commended for this  We did discuss the importance of looking very closely at the amount of calories she is taking in on a daily basis and ways the can reduce this in order to promote further weight loss  We also discussed with the patient the importance of some type of routine daily exercise program even if just a walking program in order to burn off calories and lose weight  Benign essential hypertension  Patient's blood pressure is showing adequate control with present treatment  Patient will continue present medication and surveillance  We will continue to also monitor his renal function which has been stable  Hyperlipidemia  Patient does have a history of hyperlipidemia  Because of intolerance to statins he remains on fenofibrate 145 mg daily  He admits that he is not perfect with his diet especially he is eating a lot of lunch meat, processed meats on a regular basis  We will continue to monitor his cholesterol profile       Diagnoses and all orders for this visit:    Class 1 obesity due to excess calories with serious comorbidity and body mass index (BMI) of 30 0 to 30 9 in adult    Type 2 diabetes mellitus with hyperglycemia, without long-term current use of insulin (HCC)  -     Hemoglobin A1C; Future    Benign essential hypertension  -     Basic metabolic panel; Future    Healthcare maintenance    Mixed hyperlipidemia          Subjective:      Patient ID: Liya Tejada  is a 71 y o  male  78-year-old male history of medical problems as outlined previously who is here today for repeat Medicare wellness visit  He did have labs performed prior to the visit today and we did discuss the results of length with the patient    Patient did have recent colonoscopy finding no abnormal findings  Patient states in general he is doing well physically but still misses his wife who passed away      The following portions of the patient's history were reviewed and updated as appropriate:   He  has a past medical history of Colon polyp, DM (diabetes mellitus), type 2 ([facility] [address] ), and Hypertension  He   Patient Active Problem List    Diagnosis Date Noted    Swollen finger 06/02/2022    Class 1 obesity due to excess calories with serious comorbidity in adult 04/29/2022    Grief reaction 12/30/2021    Bronchitis 09/13/2021    Essential tremor 11/13/2020    Vitamin D deficiency 10/21/2020    Overweight 02/10/2020    Acute pain of right knee 10/08/2018    Healthcare maintenance 05/22/2018    Acute sinusitis 12/29/2017    Type 2 diabetes [facility] with hyperglycemia, without long[facility]term [facility] 08/04/2014    Anxiety 09/20/2012    Benign essential hypertension 09/20/2012    Hyperlipidemia 09/20/2012     He  has a past surgical history that includes Knee surgery (Right) and Colonoscopy  His family history includes Diabetes unspecified in his mother  He  reports that he has never smoked  He has never used smokeless tobacco  He reports current alcohol use of about 3 0 standard drinks of alcohol per week  He reports that he does not use drugs    Current Outpatient Medications   Medication Sig Dispense Refill    aspirin 81 mg chewable tablet Chew 1 tablet daily      bisoprolol-hydrochlorothiazide (ZIAC) 5-6 25 MG per tablet Take 1 tablet by mouth daily 90 tablet 3    cholecalciferol (VITAMIN D3) 1,000 units tablet Take 1 tablet (1,000 Units total) by mouth daily      citalopram (CeleXA) 10 mg tablet Take 1 tablet (10 mg total) by mouth daily 90 tablet 3    fenofibrate (TRICOR) 145 mg tablet TAKE 1 TABLET (145 MG TOTAL) BY MOUTH DAILY 90 tablet 3    glimepiride (AMARYL) 4 mg tablet TAKE 1 TABLET EVERY DAY 90 tablet 3    glucose blood (ACCU-CHEK GUIDE) test strip Use to test blood sugars twice daily as instructed 100 each 1    Lancets MISC by Does not apply route 2 (two) times a day      losartan (COZAAR) 50 mg tablet Take 1 tablet (50 mg total) by mouth daily 90 tablet 3    metFORMIN (GLUCOPHAGE-XR) 500 mg 24 hr tablet TAKE 1 TABLET TWICE DAILY 180 tablet 3    sitaGLIPtin (JANUVIA) 100 mg tablet Take 1 tablet (100 mg total) by mouth daily 30 tablet 4     No current facility-administered medications for this visit  Current Outpatient Medications on File Prior to Visit   Medication Sig    aspirin 81 mg chewable tablet Chew 1 tablet daily    bisoprolol-hydrochlorothiazide (ZIAC) 5-6 25 MG per tablet Take 1 tablet by mouth daily    cholecalciferol (VITAMIN D3) 1,000 units tablet Take 1 tablet (1,000 Units total) by mouth daily    citalopram (CeleXA) 10 mg tablet Take 1 tablet (10 mg total) by mouth daily    fenofibrate (TRICOR) 145 mg tablet TAKE 1 TABLET (145 MG TOTAL) BY MOUTH DAILY    glimepiride (AMARYL) 4 mg tablet TAKE 1 TABLET EVERY DAY    glucose blood (ACCU-CHEK GUIDE) test strip Use to test blood sugars twice daily as instructed    Lancets MISC by Does not apply route 2 (two) times a day    losartan (COZAAR) 50 mg tablet Take 1 tablet (50 mg total) by mouth daily    metFORMIN (GLUCOPHAGE-XR) 500 mg 24 hr tablet TAKE 1 TABLET TWICE DAILY    sitaGLIPtin (JANUVIA) 100 mg tablet Take 1 tablet (100 mg total) by mouth daily    [DISCONTINUED] carbidopa-levodopa (SINEMET)  mg per tablet Take 1 tablet by mouth 2 (two) times a day (Patient taking differently: Take 1 tablet by mouth daily )     No current facility-administered medications on file prior to visit  He has No Known Allergies       Review of Systems   Constitutional: Negative  HENT: Negative  Eyes: Negative  Respiratory: Negative  Cardiovascular: Negative  Gastrointestinal: Negative  Endocrine: Negative  Genitourinary: Negative      Musculoskeletal: Negative  Skin: Negative  Allergic/Immunologic: Negative  Neurological: Negative  Hematological: Negative  Psychiatric/Behavioral: Negative  Objective:      /80 (BP Location: Left arm, Patient Position: Sitting, Cuff Size: Adult)   Pulse 60   Temp (!) 97 °F (36 1 °C) (Tympanic)   Resp 18   Ht 5' 10" (1 778 m)   Wt 95 3 kg (210 lb)   SpO2 95%   BMI 30 13 kg/m²          Physical Exam  Vitals and nursing note reviewed  Constitutional:       General: He is not in acute distress  Appearance: Normal appearance  He is obese  He is not ill-appearing, toxic-appearing or diaphoretic  Comments: 78-year-old male who is awake alert no distress and oriented x3, obese   HENT:      Head: Normocephalic and atraumatic  Right Ear: Tympanic membrane, ear canal and external ear normal  There is no impacted cerumen  Left Ear: Tympanic membrane, ear canal and external ear normal  There is no impacted cerumen  Nose: Nose normal  No congestion or rhinorrhea  Mouth/Throat:      Mouth: Mucous membranes are moist       Pharynx: Oropharynx is clear  No oropharyngeal exudate or posterior oropharyngeal erythema  Eyes:      General: No scleral icterus  Right eye: No discharge  Left eye: No discharge  Extraocular Movements: Extraocular movements intact  Conjunctiva/sclera: Conjunctivae normal       Pupils: Pupils are equal, round, and reactive to light  Neck:      Vascular: No carotid bruit  Cardiovascular:      Rate and Rhythm: Normal rate and regular rhythm  Pulses: Normal pulses  Heart sounds: Normal heart sounds  No murmur heard  No friction rub  No gallop  Pulmonary:      Effort: Pulmonary effort is normal  No respiratory distress  Breath sounds: Normal breath sounds  No stridor  No wheezing, rhonchi or rales  Chest:      Chest wall: No tenderness  Abdominal:      General: Bowel sounds are normal  There is no distension  Palpations: Abdomen is soft  There is no mass  Tenderness: There is no abdominal tenderness  There is no right CVA tenderness, left CVA tenderness, guarding or rebound  Hernia: No hernia is present  Genitourinary:     Penis: Normal        Testes: Normal       Prostate: Normal       Rectum: Normal    Musculoskeletal:         General: No swelling, tenderness, deformity or signs of injury  Normal range of motion  Cervical back: Normal range of motion and neck supple  No rigidity or tenderness  Right lower leg: No edema  Left lower leg: No edema  Lymphadenopathy:      Cervical: No cervical adenopathy  Skin:     General: Skin is warm and dry  Capillary Refill: Capillary refill takes less than 2 seconds  Coloration: Skin is not jaundiced or pale  Findings: No bruising, erythema, lesion or rash  Neurological:      General: No focal deficit present  Mental Status: He is alert and oriented to person, place, and time  Mental status is at baseline  Cranial Nerves: No cranial nerve deficit  Sensory: No sensory deficit  Motor: No weakness  Coordination: Coordination normal       Gait: Gait normal       Deep Tendon Reflexes: Reflexes normal    Psychiatric:         Mood and Affect: Mood normal          Behavior: Behavior normal          Thought Content:  Thought content normal          Judgment: Judgment normal

## 2022-10-13 ENCOUNTER — CLINICAL SUPPORT (OUTPATIENT)
Dept: INTERNAL MEDICINE CLINIC | Facility: CLINIC | Age: 72
End: 2022-10-13
Payer: MEDICARE

## 2022-10-13 DIAGNOSIS — Z23 ENCOUNTER FOR IMMUNIZATION: Primary | ICD-10-CM

## 2022-10-13 PROCEDURE — 90662 IIV NO PRSV INCREASED AG IM: CPT | Performed by: INTERNAL MEDICINE

## 2022-10-13 PROCEDURE — G0008 ADMIN INFLUENZA VIRUS VAC: HCPCS | Performed by: INTERNAL MEDICINE

## 2022-12-28 ENCOUNTER — TELEPHONE (OUTPATIENT)
Dept: INTERNAL MEDICINE CLINIC | Facility: CLINIC | Age: 72
End: 2022-12-28

## 2022-12-28 DIAGNOSIS — J40 BRONCHITIS: Primary | ICD-10-CM

## 2022-12-28 RX ORDER — AZITHROMYCIN 250 MG/1
TABLET, FILM COATED ORAL
Qty: 6 TABLET | Refills: 0 | Status: SHIPPED | OUTPATIENT
Start: 2022-12-28 | End: 2023-01-01

## 2022-12-28 NOTE — ASSESSMENT & PLAN NOTE
Patient has been sick since [place] day with bronchitis  States does have a cough and is taking cough medicine  Was seen by visiting nurse yesterday and they suggested he restart taking the azithromycin Z-Epifanio which is helped in the past   Patient will continue to take his cough medicine and again prescription was sent to the pharmacy  To call if not improving

## 2022-12-28 NOTE — TELEPHONE ENCOUNTER
Pt called and stated that the visiting nurse told him to call and talk to you about his cough  She feels he needs a z pack  He wants to discuss this with you

## 2023-01-03 ENCOUNTER — RA CDI HCC (OUTPATIENT)
Dept: OTHER | Facility: HOSPITAL | Age: 73
End: 2023-01-03

## 2023-01-03 NOTE — PROGRESS NOTES
[facility] [address]  coding opportunities       Chart reviewed, no opportunity found: CHART REVIEWED, NO OPPORTUNITY FOUND        Patients Insurance     Medicare Insurance: Medicare

## 2023-01-04 ENCOUNTER — APPOINTMENT (OUTPATIENT)
Dept: LAB | Facility: CLINIC | Age: 73
End: 2023-01-04

## 2023-01-04 DIAGNOSIS — E11.65 TYPE 2 DIABETES MELLITUS WITH HYPERGLYCEMIA, WITHOUT LONG-TERM CURRENT USE OF INSULIN (HCC): ICD-10-CM

## 2023-01-04 DIAGNOSIS — I10 BENIGN ESSENTIAL HYPERTENSION: ICD-10-CM

## 2023-01-04 LAB
ANION GAP SERPL CALCULATED.3IONS-SCNC: 3 MMOL/L (ref 4–13)
BUN SERPL-MCNC: 15 MG/DL (ref 5–25)
CALCIUM SERPL-MCNC: 9.3 MG/DL (ref 8.3–10.1)
CHLORIDE SERPL-SCNC: 107 MMOL/L (ref 96–108)
CO2 SERPL-SCNC: 27 MMOL/L (ref 21–32)
CREAT SERPL-MCNC: 0.95 MG/DL (ref 0.6–1.3)
GFR SERPL CREATININE-BSD FRML MDRD: 80 ML/MIN/1.73SQ M
GLUCOSE P FAST SERPL-MCNC: 204 MG/DL (ref 65–99)
POTASSIUM SERPL-SCNC: 4.5 MMOL/L (ref 3.5–5.3)
SODIUM SERPL-SCNC: 137 MMOL/L (ref 135–147)

## 2023-01-05 ENCOUNTER — OFFICE VISIT (OUTPATIENT)
Dept: INTERNAL MEDICINE CLINIC | Facility: CLINIC | Age: 73
End: 2023-01-05

## 2023-01-05 VITALS
DIASTOLIC BLOOD PRESSURE: 88 MMHG | OXYGEN SATURATION: 96 % | BODY MASS INDEX: 29.49 KG/M2 | TEMPERATURE: 98 F | WEIGHT: 206 LBS | HEART RATE: 59 BPM | RESPIRATION RATE: 16 BRPM | SYSTOLIC BLOOD PRESSURE: 140 MMHG | HEIGHT: 70 IN

## 2023-01-05 DIAGNOSIS — I10 BENIGN ESSENTIAL HYPERTENSION: ICD-10-CM

## 2023-01-05 DIAGNOSIS — J40 BRONCHITIS: Primary | ICD-10-CM

## 2023-01-05 DIAGNOSIS — Z12.5 PROSTATE CANCER SCREENING: ICD-10-CM

## 2023-01-05 DIAGNOSIS — Z00.00 HEALTHCARE MAINTENANCE: ICD-10-CM

## 2023-01-05 DIAGNOSIS — E55.9 VITAMIN D DEFICIENCY: ICD-10-CM

## 2023-01-05 DIAGNOSIS — E11.65 TYPE 2 DIABETES MELLITUS WITH HYPERGLYCEMIA, WITHOUT LONG-TERM CURRENT USE OF INSULIN (HCC): ICD-10-CM

## 2023-01-05 LAB
EST. AVERAGE GLUCOSE BLD GHB EST-MCNC: 200 MG/DL
HBA1C MFR BLD: 8.6 %

## 2023-01-05 RX ORDER — BENZONATATE 100 MG/1
100 CAPSULE ORAL 3 TIMES DAILY PRN
Qty: 45 CAPSULE | Refills: 2 | Status: SHIPPED | OUTPATIENT
Start: 2023-01-05

## 2023-01-05 NOTE — ASSESSMENT & PLAN NOTE
Patient's blood pressure is stable as well as his renal function  Patient will continue present medication and surveillance  We will make adjustments with medication in the future if indicated depending on blood pressure readings and also renal function

## 2023-01-05 NOTE — PROGRESS NOTES
Name: Lauren Rocha  : 1950      MRN: [id number]  Encounter Provider: Fadi Zhao DO  Encounter Date: 2023   Encounter department: [address] [place] [address] INTERNAL MEDICINE    Assessment & Plan     1  Bronchitis  Assessment & Plan:  Patient relates that at [place] he did develop an upper respiratory tract infection, bronchitis  Patient was not seen in the office but was placed on azithromycin Z-Epifanio and he states he is slowly improving with less cough but still some residual   He has no fever or chills and states he has no shortness of breath  He is continuing to take over-the-counter medication including Mucinex DM  Keeping well-hydrated and well nourished  Patient was given a prescription for Tessalon Perles to help with a tickle with his cough and instructed to call if not continuing to improve    Orders:  -     benzonatate (TESSALON PERLES) 100 mg capsule; Take 1 capsule (100 mg total) by mouth 3 (three) times a day as needed for cough    2  Healthcare maintenance  -     Comprehensive metabolic panel; Future  -     CBC and differential; Future  -     Lipid panel; Future  -     UA (URINE) with reflex to Scope; Future; Expected date: 2023  -     Hemoglobin A1C; Future  -     Microalbumin / creatinine urine ratio  -     PSA, Total Screen; Future  -     Occult Blood, Fecal Immunochemical; Future  -     Vitamin D 25 hydroxy; Future    3  Type 2 diabetes mellitus with hyperglycemia, without long-term current use of insulin [facility])  Assessment & Plan:  Patient continues to show poor control of his diabetes  He does admit the fact that he is not watching his diet  We reinforced the importance of decreasing his intake of concentrated sweets and simple carbohydrates but calories overall  To continue present medication and surveillance and recheck hemoglobin A1c microalbumin with his next visit  He was told if not improving we need to send him to be evaluated by endocrinology    Lab Results   Component Value Date    HGBA1C 8 6 (H) 01/04/2023       Orders:  -     Hemoglobin A1C; Future  -     Microalbumin / creatinine urine ratio    4  Benign essential hypertension  Assessment & Plan:  Patient's blood pressure is stable as well as his renal function  Patient will continue present medication and surveillance  We will make adjustments with medication in the future if indicated depending on blood pressure readings and also renal function  Orders:  -     Comprehensive metabolic panel; Future  -     CBC and differential; Future  -     Lipid panel; Future  -     UA (URINE) with reflex to Scope; Future; Expected date: 01/05/2023    5  Vitamin D deficiency  Assessment & Plan:  History of vitamin D deficiency  He is taking 1000 international units daily  We will continue with present medication check a level with his next visit in 4 months  Orders:  -     Vitamin D 25 hydroxy; Future    6  Prostate cancer screening  -     PSA, Total Screen; Future           Subjective     Patient is a 43-year-old male history of multiple medical problems who is here today for routine follow-up  He did have labs performed prior to the visit today and we did discuss the results at length with the patient  Patient relates he has been doing well up until [place] when he developed an upper respiratory tract infection, bronchitis  He was placed on antibiotics and states he is feeling better but still not 100% with still residual cough  Review of Systems   Constitutional: Negative  HENT: Negative  Eyes: Negative  Respiratory: Positive for cough  Negative for apnea, choking, chest tightness, shortness of breath, wheezing and stridor  Cardiovascular: Negative  Gastrointestinal: Negative  Endocrine: Negative  Genitourinary: Negative  Musculoskeletal: Negative  Skin: Negative  Allergic/Immunologic: Negative  Neurological: Negative  Psychiatric/Behavioral: Negative          Past Medical History:   Diagnosis Date   • Colon polyp    • DM (diabetes mellitus), type 2 ([facility] [address] )    • Hypertension      Past Surgical History:   Procedure Laterality Date   • COLONOSCOPY     • KNEE SURGERY Right      Family History   Problem Relation Age of Onset   • Diabetes unspecified Mother      Social History     Socioeconomic History   • Marital status: /Civil Union     Spouse name: None   • Number of children: None   • Years of education: None   • Highest education level: None   Occupational History   • None   Tobacco Use   • Smoking status: Never   • Smokeless tobacco: Never   Vaping Use   • Vaping Use: Never used   Substance and Sexual Activity   • Alcohol use: Yes     Alcohol/week: 3 0 standard drinks     Types: 3 Cans of beer per week   • Drug use: Never   • Sexual activity: None   Other Topics Concern   • None   Social History Narrative   • None     Social Determinants of Health     Financial Resource Strain: Low Risk    • Difficulty of Paying Living Expenses: Not very hard   Food Insecurity: Not on file   Transportation Needs: No Transportation Needs   • Lack of Transportation (Medical): No   • Lack of Transportation (Non-Medical):  No   Physical Activity: Not on file   Stress: Not on file   Social Connections: Not on file   Intimate Partner Violence: Not on file   Housing Stability: Not on file     Current Outpatient Medications on File Prior to Visit   Medication Sig   • aspirin 81 mg chewable tablet Chew 1 tablet daily   • bisoprolol-hydrochlorothiazide (ZIAC) 5-6 25 MG per tablet Take 1 tablet by mouth daily   • cholecalciferol (VITAMIN D3) 1,000 units tablet Take 1 tablet (1,000 Units total) by mouth daily   • citalopram (CeleXA) 10 mg tablet Take 1 tablet (10 mg total) by mouth daily   • fenofibrate (TRICOR) 145 mg tablet TAKE 1 TABLET (145 MG TOTAL) BY MOUTH DAILY   • glimepiride (AMARYL) 4 mg tablet TAKE 1 TABLET EVERY DAY   • glucose blood (ACCU-CHEK GUIDE) test strip Use to test blood sugars twice daily as instructed   • Lancets MISC by Does not apply route 2 (two) times a day   • losartan (COZAAR) 50 mg tablet Take 1 tablet (50 mg total) by mouth daily   • metFORMIN (GLUCOPHAGE-XR) 500 mg 24 hr tablet TAKE 1 TABLET TWICE DAILY   • sitaGLIPtin (JANUVIA) 100 mg tablet Take 1 tablet (100 mg total) by mouth daily   • [DISCONTINUED] carbidopa-levodopa (SINEMET)  mg per tablet Take 1 tablet by mouth 2 (two) times a day (Patient taking differently: Take 1 tablet by mouth daily )     No Known Allergies  Immunization History   Administered Date(s) Administered   • COVID-19 MODERNA VACC 0 5 ML IM 03/19/2021   • INFLUENZA 10/13/2022   • Influenza Quadrivalent Preservative Free 3 years and older IM 09/29/2014   • Influenza Quadrivalent, 6-35 Months IM 09/18/2015   • Influenza Split High Dose Preservative Free IM 10/19/2016, 09/15/2017   • Influenza, high dose seasonal 0 7 mL 10/07/2019, 11/13/2020, 10/21/2021, 10/13/2022   • Influenza, injectable, quadrivalent, preservative free 0 5 mL 09/29/2018   • Influenza, seasonal, injectable 09/20/2012, 09/24/2013   • Pneumococcal Conjugate 13-Valent 10/19/2016       Objective     /88   Pulse 59   Temp 98 °F (36 7 °C)   Resp 16   Ht 5' 10" (1 778 m)   Wt 93 4 kg (206 lb)   SpO2 96%   BMI 29 56 kg/m²     Physical Exam  Vitals and nursing note reviewed  Constitutional:       General: He is not in acute distress  Appearance: Normal appearance  He is not ill-appearing, toxic-appearing or diaphoretic  Comments: Pleasant, overweight 73-year-old male who is awake alert no acute distress and oriented x3,   HENT:      Head: Normocephalic and atraumatic  Right Ear: Tympanic membrane, ear canal and external ear normal  There is no impacted cerumen  Left Ear: Tympanic membrane, ear canal and external ear normal  There is no impacted cerumen  Nose: Nose normal  No congestion or rhinorrhea        Mouth/Throat:      Mouth: Mucous membranes are moist       Pharynx: Oropharynx is clear  No oropharyngeal exudate or posterior oropharyngeal erythema  Eyes:      General: No scleral icterus  Right eye: No discharge  Left eye: No discharge  Extraocular Movements: Extraocular movements intact  Conjunctiva/sclera: Conjunctivae normal       Pupils: Pupils are equal, round, and reactive to light  Neck:      Vascular: No carotid bruit  Cardiovascular:      Rate and Rhythm: Normal rate and regular rhythm  Pulses: Normal pulses  Heart sounds: No murmur heard  No friction rub  No gallop  Pulmonary:      Effort: Pulmonary effort is normal  No respiratory distress  Breath sounds: Normal breath sounds  No stridor  No wheezing, rhonchi or rales  Chest:      Chest wall: No tenderness  Abdominal:      General: Bowel sounds are normal  There is no distension  Palpations: Abdomen is soft  There is no mass  Tenderness: There is no abdominal tenderness  There is no right CVA tenderness, left CVA tenderness, guarding or rebound  Hernia: No hernia is present  Musculoskeletal:         General: No swelling, tenderness, deformity or signs of injury  Normal range of motion  Cervical back: Normal range of motion and neck supple  No rigidity or tenderness  Right lower leg: No edema  Left lower leg: No edema  Lymphadenopathy:      Cervical: No cervical adenopathy  Skin:     General: Skin is warm and dry  Capillary Refill: Capillary refill takes less than 2 seconds  Coloration: Skin is not jaundiced or pale  Findings: No bruising, erythema, lesion or rash  Neurological:      General: No focal deficit present  Mental Status: He is alert and oriented to person, place, and time  Mental status is at baseline  Cranial Nerves: No cranial nerve deficit  Sensory: No sensory deficit  Motor: No weakness        Coordination: Coordination normal       Gait: Gait normal  Deep Tendon Reflexes: Reflexes normal    Psychiatric:         Mood and Affect: Mood normal          Behavior: Behavior normal          Thought Content:  Thought content normal          Judgment: Judgment normal        Lenny Eduardo DO

## 2023-01-05 NOTE — ASSESSMENT & PLAN NOTE
Patient continues to show poor control of his diabetes  He does admit the fact that he is not watching his diet  We reinforced the importance of decreasing his intake of concentrated sweets and simple carbohydrates but calories overall  To continue present medication and surveillance and recheck hemoglobin A1c microalbumin with his next visit  He was told if not improving we need to send him to be evaluated by endocrinology    Lab Results   Component Value Date    HGBA1C 8 6 (H) 01/04/2023

## 2023-01-05 NOTE — ASSESSMENT & PLAN NOTE
Patient relates that at [place] he did develop an upper respiratory tract infection, bronchitis  Patient was not seen in the office but was placed on azithromycin Z-Epifanio and he states he is slowly improving with less cough but still some residual   He has no fever or chills and states he has no shortness of breath  He is continuing to take over-the-counter medication including Mucinex DM  Keeping well-hydrated and well nourished    Patient was given a prescription for Tessalon Perles to help with a tickle with his cough and instructed to call if not continuing to improve

## 2023-01-05 NOTE — ASSESSMENT & PLAN NOTE
Patient: Toño Mccartney Date: 2019   : 1956 Attending: Mazin Medeiros MD   62 year old male Room: /A     Chief Complaint: CAD, AS, viral URI  Post-op Day #: 8  Surgical Procedure: 1. Myocardial revascularization x 4. LIMA-> proximal LAD. SVG to RCA. SVG to M3  to apical LAD.  Left  leg EVH. 2. Aortic valve replacement - 25 mm Intuity Elite rapid deployment bovine pericardial tissue valve  3. Left atrial appendectomy.    19  Mediastinal exploration, hematoma evacuation, control of bleeding    Subjective: Up in chair, states feels wheezy today.  Denies sob.     Vital Last Value 24 Hour Range   Temperature 98 °F (36.7 °C) (19 0402) Temp  Min: 97.3 °F (36.3 °C)  Max: 98.6 °F (37 °C)   Pulse 69 (19 0835) Pulse  Min: 60  Max: 94   Respiratory Rate 17 (19 0835) Resp  Min: 17  Max: 20   Non-Invasive   Blood Pressure 130/79 (19 0835) BP  Min: 114/56  Max: 131/85   Pulse Oximetry 96 % (19 0835) SpO2  Min: 96 %  Max: 98 %     Oxygen: RA    Weight over the past 48 Hours:  Patient Vitals for the past 48 hrs:   Weight   19 0600 (!) 144.6 kg   19 1045 (!) 146.5 kg   19 0504 (!) 145.8 kg      Admit Weight:   Weight: (!) 147.4 kg (19 1013)  BMI:   BMI (Calculated): 45.33 (19 1013)    Intake/Output:    Last Stool Occurrence: 1(large, formed) (19 0915)    I/O this shift:  In: 420 [P.O.:420]  Out: 700 [Urine:700]    I/O last 3 completed shifts:  In: 620 [P.O.:620]  Out: 3025 [Urine:3025]      Intake/Output Summary (Last 24 hours) at 2019 1049  Last data filed at 2019 1026  Gross per 24 hour   Intake 1040 ml   Output 3575 ml   Net -2535 ml       Rhythm: Sinus rhythm    Physical Exam:   Heart: Regular rate and rhythm, S1, S2 normal   Lungs: Diminished breath sounds bilaterally. Anteriorly with expiratory wheezes scattered  Abdomen: +BT, soft, mild distended, +BM  Incision(s): Sternum intact. No click. Distal incision with serous drainage. ,  History of vitamin D deficiency  He is taking 1000 international units daily  We will continue with present medication check a level with his next visit in 4 months  Right leg CDI and Left leg CDI  Neurologic: Grossly normal  Extremities: edema 2+ BLE    Laboratory Results:    Recent Labs   Lab 01/22/19  0405 01/21/19  1640 01/21/19  1010 01/21/19  0420 01/20/19  0415  01/18/19  0415  01/15/19  1509 01/15/19  1245   SODIUM 140  --   --  138 140   < > 138   < >  --   --    POTASSIUM 3.6 3.6 3.4 3.2* 3.5   < > 3.5   < >  --   --    CHLORIDE 100  --   --  99 99   < > 99   < >  --   --    CO2 33*  --   --  33* 32   < > 32   < >  --   --    BUN 54*  --   --  59* 60*   < > 46*   < >  --   --    CREATININE 1.45*  --   --  1.58* 1.75*   < > 1.59*   < >  --   --    GLUCOSE 97  --   --  81 110*   < > 122*   < >  --   --    MG  --   --   --   --   --   --  2.8*  --   --   --    WBC 18.0*  --   --  17.2* 16.9*   < > 17.8*   < > 14.6* 14.8*   HGB 8.3*  --   --  7.9* 7.5*   < > 7.4*   < > 8.0* 8.3*   HCT 26.2*  --   --  25.2* 23.0*   < > 23.2*   < > 23.5* 24.0*     --   --  287 226   < > 118*   < > 115* 132*   PT  --   --   --   --   --   --   --   --  11.8 11.6   INR  --   --   --   --   --   --   --   --  1.1 1.1   PTT  --   --   --   --   --   --   --   --  30 29    < > = values in this interval not displayed.       Cultures: Not Applicable    Chest X-Ray: Reviewed Small bilateral pleural effusions with persistent strandy  infiltrate or atelectasis in the mid to inferior left lung    Medications/Infusions:  Scheduled:   • clopidogrel  75 mg Oral Daily   • insulin glargine  28 Units Subcutaneous 2 times per day   • atorvastatin  80 mg Oral Nightly   • insulin lispro  14 Units Subcutaneous TID WC   • insulin lispro   Subcutaneous Nightly   • famotidine  20 mg Oral Daily   • furosemide  40 mg Oral Daily   • sodium chloride (PF)  2 mL Injection 2 times per day   • metoPROLOL tartrate  25 mg Oral 2 times per day   • polyethylene glycol  17 g Oral BID   • gabapentin  600 mg Oral BID   • acetaminophen  1,000 mg Oral Q6H   • aspirin  81 mg Oral Daily   • docusate sodium-sennosides  2 tablet  Oral Daily   • sodium biphosphate  1 enema Rectal Once       Continuous Infusions:   • dextrose 5 % infusion     • sodium chloride 0.9% infusion Stopped (01/21/19 0700)       CMS Criteria:  ASA: Yes    BB: Yes    Statin:Yes    ACE: ACEI / ARB not ordered due to Renal Insufficiency    Cardiologist: Reggie  EF: 50%  Aniya MRSA: - MSSA: -  Hemoglobin A1c: 7.3  Preoperative Creatinine: 1.35    Assessment/Plan:  Patient is s/p CABG x 4, AVR, PRINCE and bring back for bleed  NSTEMI: plavix, BB.  No acei secondary to renal insufficiency  Hypervolemia/Acute kidney injury:Weight and creatinine stable on daily lasix (was on BID + metolazone and had elevated creat) Wt trending down. Resume metolazone 3 days per week. Recheck BMP  Hypoxia/Tobacco aubse/Obstructive sleep apnea/RAD: off oxygen  DM:  Endo following.   Leukoyctosis / Recent URI: Aggressive pulmonary toilet. Tessalon and guaifenesin Persistent elevated WBC up today. Afebrile. PA and lateral CXR.   Gout: home colchicine   Sternum moist- serous drainage, intact, paint BID with betadine. No click heard or felt.    PT/OT    Pathways:  Wires Out: Yes  Ambulating: Yes  Coumadin: No    Discussed with or notes reviewed:  Patient, RN and MD    Discharge Disposition: Home  In next 24-48 hrs

## 2023-01-17 DIAGNOSIS — F41.9 ANXIETY: ICD-10-CM

## 2023-01-17 DIAGNOSIS — I10 ESSENTIAL HYPERTENSION: ICD-10-CM

## 2023-01-17 DIAGNOSIS — E11.9 TYPE 2 DIABETES MELLITUS WITHOUT COMPLICATION, WITHOUT LONG-TERM CURRENT USE OF INSULIN (HCC): ICD-10-CM

## 2023-01-17 RX ORDER — LOSARTAN POTASSIUM 50 MG/1
50 TABLET ORAL DAILY
Qty: 90 TABLET | Refills: 3 | Status: SHIPPED | OUTPATIENT
Start: 2023-01-17

## 2023-01-17 RX ORDER — BISOPROLOL FUMARATE AND HYDROCHLOROTHIAZIDE 5; 6.25 MG/1; MG/1
1 TABLET ORAL DAILY
Qty: 90 TABLET | Refills: 3 | Status: SHIPPED | OUTPATIENT
Start: 2023-01-17

## 2023-01-17 RX ORDER — CITALOPRAM 10 MG/1
10 TABLET ORAL DAILY
Qty: 90 TABLET | Refills: 3 | Status: SHIPPED | OUTPATIENT
Start: 2023-01-17

## 2023-01-17 RX ORDER — FENOFIBRATE 145 MG/1
145 TABLET, COATED ORAL DAILY
Qty: 90 TABLET | Refills: 3 | Status: SHIPPED | OUTPATIENT
Start: 2023-01-17

## 2023-02-07 DIAGNOSIS — E11.9 TYPE 2 DIABETES MELLITUS WITHOUT COMPLICATION, WITHOUT LONG-TERM CURRENT USE OF INSULIN (HCC): ICD-10-CM

## 2023-02-07 DIAGNOSIS — I10 ESSENTIAL HYPERTENSION: ICD-10-CM

## 2023-02-07 DIAGNOSIS — F41.9 ANXIETY: ICD-10-CM

## 2023-02-07 RX ORDER — LOSARTAN POTASSIUM 50 MG/1
50 TABLET ORAL DAILY
Qty: 30 TABLET | Refills: 1 | Status: SHIPPED | OUTPATIENT
Start: 2023-02-07 | End: 2023-02-14 | Stop reason: SDUPTHER

## 2023-02-07 RX ORDER — FENOFIBRATE 145 MG/1
145 TABLET, COATED ORAL DAILY
Qty: 30 TABLET | Refills: 1 | Status: SHIPPED | OUTPATIENT
Start: 2023-02-07

## 2023-02-07 RX ORDER — METFORMIN HYDROCHLORIDE 500 MG/1
500 TABLET, EXTENDED RELEASE ORAL 2 TIMES DAILY
Qty: 60 TABLET | Refills: 1 | Status: SHIPPED | OUTPATIENT
Start: 2023-02-07

## 2023-02-07 RX ORDER — CITALOPRAM 10 MG/1
10 TABLET ORAL DAILY
Qty: 30 TABLET | Refills: 1 | Status: SHIPPED | OUTPATIENT
Start: 2023-02-07 | End: 2023-02-10 | Stop reason: SDUPTHER

## 2023-02-10 DIAGNOSIS — F41.9 ANXIETY: ICD-10-CM

## 2023-02-10 RX ORDER — CITALOPRAM 10 MG/1
10 TABLET ORAL DAILY
Qty: 30 TABLET | Refills: 1 | Status: SHIPPED | OUTPATIENT
Start: 2023-02-10

## 2023-02-14 DIAGNOSIS — I10 ESSENTIAL HYPERTENSION: ICD-10-CM

## 2023-02-14 RX ORDER — LOSARTAN POTASSIUM 50 MG/1
50 TABLET ORAL DAILY
Qty: 90 TABLET | Refills: 1 | Status: SHIPPED | OUTPATIENT
Start: 2023-02-14 | End: 2023-02-21 | Stop reason: SDUPTHER

## 2023-02-21 ENCOUNTER — ESTABLISHED COMPREHENSIVE EXAM (OUTPATIENT)
Dept: URBAN - METROPOLITAN AREA CLINIC 6 | Facility: CLINIC | Age: 73
End: 2023-02-21

## 2023-02-21 DIAGNOSIS — E11.9: ICD-10-CM

## 2023-02-21 DIAGNOSIS — E11.649 UNCONTROLLED TYPE 2 DIABETES MELLITUS WITH HYPOGLYCEMIA WITHOUT COMA (HCC): ICD-10-CM

## 2023-02-21 DIAGNOSIS — F41.9 ANXIETY: ICD-10-CM

## 2023-02-21 DIAGNOSIS — I10 ESSENTIAL HYPERTENSION: ICD-10-CM

## 2023-02-21 DIAGNOSIS — E11.9 TYPE 2 DIABETES MELLITUS WITHOUT COMPLICATION, WITHOUT LONG-TERM CURRENT USE OF INSULIN (HCC): ICD-10-CM

## 2023-02-21 DIAGNOSIS — H25.813: ICD-10-CM

## 2023-02-21 LAB
LEFT EYE DIABETIC RETINOPATHY: NORMAL
RIGHT EYE DIABETIC RETINOPATHY: NORMAL

## 2023-02-21 PROCEDURE — 92014 COMPRE OPH EXAM EST PT 1/>: CPT

## 2023-02-21 PROCEDURE — 2023F DILAT RTA XM W/O RTNOPTHY: CPT | Performed by: INTERNAL MEDICINE

## 2023-02-21 RX ORDER — BISOPROLOL FUMARATE AND HYDROCHLOROTHIAZIDE 5; 6.25 MG/1; MG/1
1 TABLET ORAL DAILY
Qty: 90 TABLET | Refills: 3 | Status: SHIPPED | OUTPATIENT
Start: 2023-02-21

## 2023-02-21 RX ORDER — METFORMIN HYDROCHLORIDE 500 MG/1
500 TABLET, EXTENDED RELEASE ORAL 2 TIMES DAILY
Qty: 60 TABLET | Refills: 2 | Status: SHIPPED | OUTPATIENT
Start: 2023-02-21

## 2023-02-21 RX ORDER — GLIMEPIRIDE 4 MG/1
4 TABLET ORAL DAILY
Qty: 90 TABLET | Refills: 3 | Status: SHIPPED | OUTPATIENT
Start: 2023-02-21

## 2023-02-21 RX ORDER — FENOFIBRATE 145 MG/1
145 TABLET, COATED ORAL DAILY
Qty: 90 TABLET | Refills: 1 | Status: SHIPPED | OUTPATIENT
Start: 2023-02-21

## 2023-02-21 RX ORDER — CITALOPRAM 10 MG/1
10 TABLET ORAL DAILY
Qty: 10 TABLET | Refills: 1 | Status: SHIPPED | OUTPATIENT
Start: 2023-02-21 | End: 2023-02-24 | Stop reason: SDUPTHER

## 2023-02-21 RX ORDER — LOSARTAN POTASSIUM 50 MG/1
50 TABLET ORAL DAILY
Qty: 90 TABLET | Refills: 1 | Status: SHIPPED | OUTPATIENT
Start: 2023-02-21

## 2023-02-21 RX ORDER — CITALOPRAM 10 MG/1
10 TABLET ORAL DAILY
Qty: 90 TABLET | Refills: 1 | Status: SHIPPED | OUTPATIENT
Start: 2023-02-21 | End: 2023-02-21 | Stop reason: SDUPTHER

## 2023-02-21 ASSESSMENT — VISUAL ACUITY
OD_CC: 20/25
OS_CC: 20/25

## 2023-02-21 ASSESSMENT — TONOMETRY
OD_IOP_MMHG: 11
OS_IOP_MMHG: 12

## 2023-02-24 DIAGNOSIS — F41.9 ANXIETY: ICD-10-CM

## 2023-02-24 RX ORDER — CITALOPRAM 10 MG/1
10 TABLET ORAL DAILY
Qty: 10 TABLET | Refills: 1 | Status: SHIPPED | OUTPATIENT
Start: 2023-02-24

## 2023-03-05 ENCOUNTER — APPOINTMENT (EMERGENCY)
Dept: RADIOLOGY | Facility: HOSPITAL | Age: 73
End: 2023-03-05

## 2023-03-05 ENCOUNTER — APPOINTMENT (EMERGENCY)
Dept: CT IMAGING | Facility: HOSPITAL | Age: 73
End: 2023-03-05

## 2023-03-05 ENCOUNTER — HOSPITAL ENCOUNTER (EMERGENCY)
Facility: HOSPITAL | Age: 73
Discharge: HOME | End: 2023-03-05
Attending: EMERGENCY MEDICINE

## 2023-03-05 VITALS
HEART RATE: 83 BPM | HEIGHT: 70 IN | TEMPERATURE: 98 F | BODY MASS INDEX: 30.24 KG/M2 | RESPIRATION RATE: 18 BRPM | DIASTOLIC BLOOD PRESSURE: 83 MMHG | SYSTOLIC BLOOD PRESSURE: 143 MMHG | OXYGEN SATURATION: 94 % | WEIGHT: 211.2 LBS

## 2023-03-05 DIAGNOSIS — R10.13 EPIGASTRIC ABDOMINAL PAIN: ICD-10-CM

## 2023-03-05 DIAGNOSIS — I10 ELEVATED BLOOD PRESSURE READING WITH DIAGNOSIS OF HYPERTENSION: ICD-10-CM

## 2023-03-05 DIAGNOSIS — R07.9 CHEST PAIN: Primary | ICD-10-CM

## 2023-03-05 DIAGNOSIS — F41.9 ANXIETY: ICD-10-CM

## 2023-03-05 DIAGNOSIS — K80.20 CHOLELITHIASIS: ICD-10-CM

## 2023-03-05 LAB
2HR DELTA HS TROPONIN: -4 NG/L
4HR DELTA HS TROPONIN: 3 NG/L
ALBUMIN SERPL BCP-MCNC: 4.7 G/DL (ref 3.5–5)
ALP SERPL-CCNC: 43 U/L (ref 34–104)
ALT SERPL W P-5'-P-CCNC: 21 U/L (ref 7–52)
ANION GAP SERPL CALCULATED.3IONS-SCNC: 9 MMOL/L (ref 4–13)
AST SERPL W P-5'-P-CCNC: 14 U/L (ref 13–39)
ATRIAL RATE: 76 BPM
BASOPHILS # BLD AUTO: 0.04 THOUSANDS/ÂΜL (ref 0–0.1)
BASOPHILS NFR BLD AUTO: 0 % (ref 0–1)
BILIRUB SERPL-MCNC: 0.71 MG/DL (ref 0.2–1)
BUN SERPL-MCNC: 19 MG/DL (ref 5–25)
CALCIUM SERPL-MCNC: 9.6 MG/DL (ref 8.4–10.2)
CARDIAC TROPONIN I PNL SERPL HS: 20 NG/L
CARDIAC TROPONIN I PNL SERPL HS: 24 NG/L
CARDIAC TROPONIN I PNL SERPL HS: 27 NG/L
CHLORIDE SERPL-SCNC: 99 MMOL/L (ref 96–108)
CO2 SERPL-SCNC: 26 MMOL/L (ref 21–32)
CREAT SERPL-MCNC: 0.99 MG/DL (ref 0.6–1.3)
EOSINOPHIL # BLD AUTO: 0.03 THOUSAND/ÂΜL (ref 0–0.61)
EOSINOPHIL NFR BLD AUTO: 0 % (ref 0–6)
ERYTHROCYTE [DISTWIDTH] IN BLOOD BY AUTOMATED COUNT: 13.5 % (ref 11.6–15.1)
GFR SERPL CREATININE-BSD FRML MDRD: 76 ML/MIN/1.73SQ M
GLUCOSE SERPL-MCNC: 237 MG/DL (ref 65–140)
GLUCOSE SERPL-MCNC: 306 MG/DL (ref 65–140)
HCT VFR BLD AUTO: 49.5 % (ref 36.5–49.3)
HGB BLD-MCNC: 17 G/DL (ref 12–17)
IMM GRANULOCYTES # BLD AUTO: 0.09 THOUSAND/UL (ref 0–0.2)
IMM GRANULOCYTES NFR BLD AUTO: 1 % (ref 0–2)
LIPASE SERPL-CCNC: 33 U/L (ref 11–82)
LYMPHOCYTES # BLD AUTO: 0.93 THOUSANDS/ÂΜL (ref 0.6–4.47)
LYMPHOCYTES NFR BLD AUTO: 8 % (ref 14–44)
MCH RBC QN AUTO: 29.3 PG (ref 26.8–34.3)
MCHC RBC AUTO-ENTMCNC: 34.3 G/DL (ref 31.4–37.4)
MCV RBC AUTO: 85 FL (ref 82–98)
MONOCYTES # BLD AUTO: 0.87 THOUSAND/ÂΜL (ref 0.17–1.22)
MONOCYTES NFR BLD AUTO: 7 % (ref 4–12)
NEUTROPHILS # BLD AUTO: 9.95 THOUSANDS/ÂΜL (ref 1.85–7.62)
NEUTS SEG NFR BLD AUTO: 84 % (ref 43–75)
NRBC BLD AUTO-RTO: 0 /100 WBCS
P AXIS: -8 DEGREES
PLATELET # BLD AUTO: 167 THOUSANDS/UL (ref 149–390)
PMV BLD AUTO: 10.5 FL (ref 8.9–12.7)
POTASSIUM SERPL-SCNC: 3.7 MMOL/L (ref 3.5–5.3)
PR INTERVAL: 170 MS
PROT SERPL-MCNC: 7.4 G/DL (ref 6.4–8.4)
QRS AXIS: -44 DEGREES
QRSD INTERVAL: 106 MS
QT INTERVAL: 406 MS
QTC INTERVAL: 456 MS
RBC # BLD AUTO: 5.8 MILLION/UL (ref 3.88–5.62)
SODIUM SERPL-SCNC: 134 MMOL/L (ref 135–147)
T WAVE AXIS: 74 DEGREES
VENTRICULAR RATE: 76 BPM
WBC # BLD AUTO: 11.91 THOUSAND/UL (ref 4.31–10.16)

## 2023-03-05 RX ORDER — MORPHINE SULFATE 4 MG/ML
4 INJECTION, SOLUTION INTRAMUSCULAR; INTRAVENOUS ONCE
Status: COMPLETED | OUTPATIENT
Start: 2023-03-05 | End: 2023-03-05

## 2023-03-05 RX ORDER — ONDANSETRON 2 MG/ML
4 INJECTION INTRAMUSCULAR; INTRAVENOUS ONCE
Status: COMPLETED | OUTPATIENT
Start: 2023-03-05 | End: 2023-03-05

## 2023-03-05 RX ORDER — NITROGLYCERIN 0.4 MG/1
0.4 TABLET SUBLINGUAL ONCE
Status: COMPLETED | OUTPATIENT
Start: 2023-03-05 | End: 2023-03-05

## 2023-03-05 RX ORDER — LOSARTAN POTASSIUM 50 MG/1
50 TABLET ORAL ONCE
Status: COMPLETED | OUTPATIENT
Start: 2023-03-05 | End: 2023-03-05

## 2023-03-05 RX ADMIN — NITROGLYCERIN 0.4 MG: 0.4 TABLET SUBLINGUAL at 07:28

## 2023-03-05 RX ADMIN — MORPHINE SULFATE 4 MG: 4 INJECTION INTRAVENOUS at 07:28

## 2023-03-05 RX ADMIN — SODIUM CHLORIDE 1000 ML: 0.9 INJECTION, SOLUTION INTRAVENOUS at 08:58

## 2023-03-05 RX ADMIN — IOHEXOL 100 ML: 350 INJECTION, SOLUTION INTRAVENOUS at 08:28

## 2023-03-05 RX ADMIN — ONDANSETRON 4 MG: 2 INJECTION INTRAMUSCULAR; INTRAVENOUS at 07:28

## 2023-03-05 RX ADMIN — LOSARTAN POTASSIUM 50 MG: 50 TABLET, FILM COATED ORAL at 09:20

## 2023-03-05 NOTE — ED PROVIDER NOTES
History  Chief Complaint   Patient presents with   • Chest Pain     Pt reports mid CP waking him up around 430 this am, states feels very shakey, dry mouth, denies dizziness or SOB, -NV      71-year-old male with history of hypertension diabetes, presents today with sudden onset chest pain  He states started about 0430 this morning and woke him up from sleep  He has never had any pain like this before and has no exacerbating relieving factors  He states it is constant and waxing waning severity  He denies any traumatic event  Patient states he is compliant with all his medications except for one blood pressure medication due to a complication with his pharmacy  Patient also states he had an incident where a peach was swallowed without chewing  He denies any feelings of being stuck or impacted  Patient states he does not drink regularly, but did have several drinks a few days ago in celebration  Patient endorses shortness of breath with pain  Patient denies any headaches, vision changes, fever/chills, nausea/vomiting, radiation to the back/left arm/neck, numbness/tingling  Prior to Admission Medications   Prescriptions Last Dose Informant Patient Reported? Taking?    Lancets MISC 3/4/2023  Yes Yes   Sig: by Does not apply route 2 (two) times a day   aspirin 81 mg chewable tablet 3/4/2023  Yes Yes   Sig: Chew 1 tablet daily   benzonatate (TESSALON PERLES) 100 mg capsule More than a month  No No   Sig: Take 1 capsule (100 mg total) by mouth 3 (three) times a day as needed for cough   bisoprolol-hydrochlorothiazide (ZIAC) 5-6 25 MG per tablet 3/4/2023  No Yes   Sig: Take 1 tablet by mouth daily   citalopram (CeleXA) 10 mg tablet Past Month  No Yes   Sig: Take 1 tablet (10 mg total) by mouth daily   fenofibrate (TRICOR) 145 mg tablet 3/4/2023  No Yes   Sig: Take 1 tablet (145 mg total) by mouth daily   glimepiride (AMARYL) 4 mg tablet 3/4/2023  No Yes   Sig: Take 1 tablet (4 mg total) by mouth daily glucose blood (ACCU-CHEK GUIDE) test strip 3/4/2023  No Yes   Sig: Use to test blood sugars twice daily as instructed   losartan (COZAAR) 50 mg tablet 3/4/2023  No Yes   Sig: Take 1 tablet (50 mg total) by mouth daily   metFORMIN (GLUCOPHAGE-XR) 500 mg 24 hr tablet 3/4/2023  No Yes   Sig: Take 1 tablet (500 mg total) by mouth 2 (two) times a day   sitaGLIPtin (JANUVIA) 100 mg tablet Not Taking  No No   Sig: Take 1 tablet (100 mg total) by mouth daily   Patient not taking: Reported on 3/5/2023      Facility-Administered Medications: None       Past Medical History:   Diagnosis Date   • Colon polyp    • DM (diabetes mellitus), type 2 (HCC)    • Hypertension        Past Surgical History:   Procedure Laterality Date   • COLONOSCOPY     • KNEE SURGERY Right        Family History   Problem Relation Age of Onset   • Diabetes unspecified Mother      I have reviewed and agree with the history as documented  E-Cigarette/Vaping   • E-Cigarette Use Never User      E-Cigarette/Vaping Substances     Social History     Tobacco Use   • Smoking status: Never   • Smokeless tobacco: Never   Vaping Use   • Vaping Use: Never used   Substance Use Topics   • Alcohol use: Yes     Alcohol/week: 3 0 standard drinks     Types: 3 Cans of beer per week   • Drug use: Never        Review of Systems   Constitutional: Negative for chills, diaphoresis and fever  HENT: Negative for congestion, ear pain, rhinorrhea and sore throat  Dry mouth   Eyes: Negative for visual disturbance  Respiratory: Positive for shortness of breath (with pain)  Negative for cough  Cardiovascular: Positive for chest pain  Negative for palpitations  Gastrointestinal: Negative for abdominal pain, nausea and vomiting  Genitourinary: Negative for dysuria and hematuria  Musculoskeletal: Negative for arthralgias and back pain  Skin: Negative for color change and rash  Neurological: Negative for seizures, syncope and headaches     All other systems reviewed and are negative  Physical Exam  ED Triage Vitals [03/05/23 0706]   Temperature Pulse Respirations Blood Pressure SpO2   98 °F (36 7 °C) 78 20 (!) 186/125 96 %      Temp Source Heart Rate Source Patient Position - Orthostatic VS BP Location FiO2 (%)   Oral Monitor Lying Right arm --      Pain Score       10 - Worst Possible Pain             Orthostatic Vital Signs  Vitals:    03/05/23 0915 03/05/23 1030 03/05/23 1100 03/05/23 1115   BP: 169/88 159/77 136/76 143/83   Pulse: 72 81 83 83   Patient Position - Orthostatic VS:   Sitting        Physical Exam  Vitals and nursing note reviewed  Constitutional:       General: He is in acute distress  Appearance: He is not ill-appearing  HENT:      Head: Normocephalic and atraumatic  Right Ear: External ear normal       Left Ear: External ear normal       Nose: Nose normal  No congestion or rhinorrhea  Mouth/Throat:      Mouth: Mucous membranes are moist       Pharynx: Oropharynx is clear  Eyes:      General: No scleral icterus  Extraocular Movements: Extraocular movements intact  Cardiovascular:      Rate and Rhythm: Normal rate and regular rhythm  Pulses: Normal pulses  Heart sounds: Normal heart sounds  Pulmonary:      Effort: Pulmonary effort is normal  No respiratory distress  Breath sounds: Normal breath sounds  Abdominal:      Palpations: Abdomen is soft  Tenderness: There is abdominal tenderness (epigastric)  Musculoskeletal:         General: Normal range of motion  Cervical back: Normal range of motion and neck supple  Skin:     General: Skin is warm and dry  Neurological:      General: No focal deficit present  Mental Status: He is alert and oriented to person, place, and time     Psychiatric:         Mood and Affect: Mood normal          Behavior: Behavior normal          ED Medications  Medications   morphine injection 4 mg (4 mg Intravenous Given 3/5/23 0728)   ondansetron (ZOFRAN) injection 4 mg (4 mg Intravenous Given 3/5/23 0728)   nitroglycerin (NITROSTAT) SL tablet 0 4 mg (0 4 mg Sublingual Given 3/5/23 0728)   iohexol (OMNIPAQUE) 350 MG/ML injection (SINGLE-DOSE) 100 mL (100 mL Intravenous Given 3/5/23 0828)   sodium chloride 0 9 % bolus 1,000 mL (0 mL Intravenous Stopped 3/5/23 1205)   losartan (COZAAR) tablet 50 mg (50 mg Oral Given 3/5/23 0920)       Diagnostic Studies  Results Reviewed     Procedure Component Value Units Date/Time    HS Troponin I 4hr [[id number]]  (Normal) Collected: 03/05/23 1159    Lab Status: Final result Specimen: Blood from Arm, Left Updated: 03/05/23 1233     hs TnI 4hr 27 ng/L      Delta 4hr hsTnI 3 ng/L     Fingerstick Glucose (POCT) [[id number]]  (Abnormal) Collected: 03/05/23 1036    Lab Status: Final result Updated: 03/05/23 1037     POC Glucose 237 mg/dl     HS Troponin I 2hr [[id number]]  (Normal) Collected: 03/05/23 0858    Lab Status: Final result Specimen: Blood from Arm, Left Updated: 03/05/23 0948     hs TnI 2hr 20 ng/L      Delta 2hr hsTnI -4 ng/L     Lipase [[id number]]  (Normal) Collected: 03/05/23 0711    Lab Status: Final result Specimen: Blood from Arm, Left Updated: 03/05/23 0925     Lipase 33 u/L     HS Troponin 0hr (reflex protocol) [[id number]]  (Normal) Collected: 03/05/23 0711    Lab Status: Final result Specimen: Blood from Arm, Left Updated: 03/05/23 0751     hs TnI 0hr 24 ng/L     Comprehensive metabolic panel [[id number]]  (Abnormal) Collected: 03/05/23 0711    Lab Status: Final result Specimen: Blood from Arm, Left Updated: 03/05/23 0750     Sodium 134 mmol/L      Potassium 3 7 mmol/L      Chloride 99 mmol/L      CO2 26 mmol/L      ANION GAP 9 mmol/L      BUN 19 mg/dL      Creatinine 0 99 mg/dL      Glucose 306 mg/dL      Calcium 9 6 mg/dL      AST 14 U/L      ALT 21 U/L      Alkaline Phosphatase 43 U/L      Total Protein 7 4 g/dL      Albumin 4 7 g/dL      Total Bilirubin 0 71 mg/dL      eGFR 76 ml/min/1 73sq m     Narrative: National Kidney Disease Foundation guidelines for Chronic Kidney Disease (CKD):   •  Stage 1 with normal or high GFR (GFR > 90 mL/min/1 73 square meters)  •  Stage 2 Mild CKD (GFR = 60-89 mL/min/1 73 square meters)  •  Stage 3A Moderate CKD (GFR = 45-59 mL/min/1 73 square meters)  •  Stage 3B Moderate CKD (GFR = 30-44 mL/min/1 73 square meters)  •  Stage 4 Severe CKD (GFR = 15-29 mL/min/1 73 square meters)  •  Stage 5 End Stage CKD (GFR <15 mL/min/1 73 square meters)  Note: GFR calculation is accurate only with a steady state creatinine    CBC and differential [[id number]]  (Abnormal) Collected: 03/05/23 0711    Lab Status: Final result Specimen: Blood from Arm, Left Updated: 03/05/23 0721     WBC 11 91 Thousand/uL      RBC 5 80 Million/uL      Hemoglobin 17 0 g/dL      Hematocrit 49 5 %      MCV 85 fL      MCH 29 3 pg      MCHC 34 3 g/dL      RDW 13 5 %      MPV 10 5 fL      Platelets 009 Thousands/uL      nRBC 0 /100 WBCs      Neutrophils Relative 84 %      Immat GRANS % 1 %      Lymphocytes Relative 8 %      Monocytes Relative 7 %      Eosinophils Relative 0 %      Basophils Relative 0 %      Neutrophils Absolute 9 95 Thousands/µL      Immature Grans Absolute 0 09 Thousand/uL      Lymphocytes Absolute 0 93 Thousands/µL      Monocytes Absolute 0 87 Thousand/µL      Eosinophils Absolute 0 03 Thousand/µL      Basophils Absolute 0 04 Thousands/µL                  CTA dissection protocol chest/abdomen/pelvis   Final Result by Thea Hoang MD (03/05 0911)         1  No aneurysmal dilatation or dissection of the thoracic or abdominal aorta  2   Possible vascular shunt the right lobe of the liver segment 7  Confirmation should be obtained with outpatient MRI abdomen with contrast the lungs and underlying lesion  3   Left adrenal adenoma   The study was marked in EPIC for immediate notification        Workstation performed: [id number]         XR chest 1 view portable   Final Result by Yaneth Marshall MD (03/05 1029)   Mild cardiomegaly      No acute cardiopulmonary disease  Workstation performed: [id number]               Procedures  ECG 12 Lead Documentation Only    Date/Time: 3/5/2023 7:25 AM  Performed by: Catrachita Baron DO  Authorized by: Catrachita Baron DO     Indications / Diagnosis:  CP  ECG reviewed by me, the ED Provider: yes    Patient location:  ED  Previous ECG:     Previous ECG:  Compared to current    Similarity:  No change    Comparison to cardiac monitor: Yes    Interpretation:     Interpretation: abnormal    Rate:     ECG rate:  76    ECG rate assessment: normal    Rhythm:     Rhythm: sinus rhythm    Ectopy:     Ectopy: none    QRS:     QRS axis:  Normal    QRS intervals:  Normal  Conduction:     Conduction: normal    ST segments:     ST segments:  Normal  T waves:     T waves: non-specific and inverted      Inverted:  V2 and V3          ED Course  ED Course as of 03/05/23 1312   Sun Mar 05, 2023   0723 WBC(!): 11 91   0813 hs TnI 0hr: 24   0813 Glucose, Random(!): 306   0919 On reevaluation, after morphine and nitro, patient reports almost complete resolution of symptoms however temporary  He states the pain is starting to return  0235 Lipase: 33   1007 Delta 2hr hsTnI: -4   1007 hs TnI 2hr: 20   1038 Upon reevaluation, patient states he has full resolution of pain  36 CT results were discussed with patient and daughter, who is bedside  Patient was instructed to follow-up with his primary care physician in order to schedule an MRI to further evaluate the findings in his liver  It was also discussed and he was educated on adrenal adenomas  1041 Patient's blood pressure has been controlled with pain control               HEART Risk Score    Flowsheet Row Most Recent Value   Heart Score Risk Calculator    History 1 Filed at: 03/05/2023 1311   ECG 0 Filed at: 03/05/2023 1311   Age 2 Filed at: 03/05/2023 1311   Risk Factors 1 Filed at: 03/05/2023 1311 Troponin 1 Filed at: 03/05/2023 1311   HEART Score 5 Filed at: 03/05/2023 1311                                Medical Decision Making  Paulo presented due to severe lower chest/epigastric pain which woke him up from sleep at about 0430 this morning  Patient states he never had anything like this before and has no cardiac history  Upon arrival to the ED patient was obvious distress and complaining of severe pain which is continuous, but waxing waning in severity  Cardiac work-up which included CBC, CMP, troponin x3, EKG all showed no signs of ischemia or any other significant findings  Chest x-ray and CT chest showed no acute cardiopulmonary process  No signs of dissection, esophageal rupture, pneumothorax  Patient was given morphine, nitro sublingual, and Zofran for symptoms  Patient endorses complete relief of symptoms upon multiple reevaluations while here in the ED  It was discussed with the patient that his source of pain was unknown at this time, and he was informed to have close follow-up with his primary care physician  Patient was informed of and discussed a plan about incidental findings of CT scan which included possible continuous hepatic shunt and adrenal adenoma  Patient understood and agrees with the plan  Strict return precautions given if symptoms are worsening or not resolving  Amount and/or Complexity of Data Reviewed  Labs: ordered  Decision-making details documented in ED Course  Radiology: ordered  Risk  Prescription drug management              Disposition  Final diagnoses:   Chest pain   Elevated blood pressure reading with diagnosis of hypertension   Epigastric abdominal pain   Cholelithiasis   Anxiety     Time reflects when diagnosis was documented in both MDM as applicable and the Disposition within this note     Time User Action Codes Description Comment    3/5/2023 12:23 PM Meghna Fitzgerald [R07 9] Chest pain     3/5/2023 12:24 PM Niels, [address] [place] [address] Elevated blood pressure reading with diagnosis of hypertension     3/5/2023 12:24 PM Odilon Johnson Add [R10 13] Epigastric abdominal pain     3/5/2023 12:24 PM Jinjohn Johnson Add [K80 20] Cholelithiasis     3/5/2023 12:27 PM Odilon Johnson Add [F41 9] Anxiety       ED Disposition     ED Disposition   Discharge    Condition   Stable    Date/Time   Sun Mar 5, 2023 12:23 PM    Comment   Alexx Simeon  discharge to home/self care                 Follow-up Information     Follow up With Specialties Details Why Contact Info Additional [address], DO Internal Medicine Call in 2 days As needed, For ED follow-up [address] 10[address] [facility] 107 Emergency Department Emergency Medicine Go to  If symptoms worsen or do not resolve [address] [facility] [address] [facility] Emergency Department, [address], [place], [place], [address]          Discharge Medication List as of 3/5/2023 12:27 PM      CONTINUE these medications which have NOT CHANGED    Details   aspirin 81 mg chewable tablet Chew 1 tablet daily, Starting Thu 12/6/2012, Historical Med      bisoprolol-hydrochlorothiazide (Amye Bent) 5-6 25 MG per tablet Take 1 tablet by mouth daily, Starting Tue 2/21/2023, Normal      citalopram (CeleXA) 10 mg tablet Take 1 tablet (10 mg total) by mouth daily, Starting Fri 2/24/2023, Normal      fenofibrate (TRICOR) 145 mg tablet Take 1 tablet (145 mg total) by mouth daily, Starting Tue 2/21/2023, Normal      glimepiride (AMARYL) 4 mg tablet Take 1 tablet (4 mg total) by mouth daily, Starting Tue 2/21/2023, Normal      glucose blood (ACCU-CHEK GUIDE) test strip Use to test blood sugars twice daily as instructed, Normal      Lancets MISC by Does not apply route 2 (two) times a day, Starting Fri 10/6/2017, Historical Med      losartan (COZAAR) 50 mg tablet Take 1 tablet (50 mg total) by mouth daily, Starting Tue 2/21/2023, Normal      metFORMIN (GLUCOPHAGE-XR) 500 mg 24 hr tablet Take 1 tablet (500 mg total) by mouth 2 (two) times a day, Starting Tue 2/21/2023, Normal      benzonatate (TESSALON PERLES) 100 mg capsule Take 1 capsule (100 mg total) by mouth 3 (three) times a day as needed for cough, Starting Thu 1/5/2023, Normal      sitaGLIPtin (JANUVIA) 100 mg tablet Take 1 tablet (100 mg total) by mouth daily, Starting Thu 12/30/2021, Normal           No discharge procedures on file  PDMP Review     None           ED Provider  Attending physically available and evaluated [facility]    I managed the patient along with the ED Attending      Electronically Signed by         Ton Garcia DO  03/05/23 4658

## 2023-03-05 NOTE — ED NOTES
Pt diaphoretic and c/o ongoing CP  Pt given second nitro at this time        Moise Gonzalez RN  03/05/23 5900

## 2023-03-05 NOTE — ED ATTENDING ATTESTATION
3/5/2023  I, Marquis Eitan MD, saw and evaluated the patient  I have discussed the patient with the resident/non-physician practitioner and agree with the resident's/non-physician practitioner's findings, Plan of Care, and MDM as documented in the resident's/non-physician practitioner's note, except where noted  All available labs and Radiology studies were reviewed  I was present for key portions of any procedure(s) performed by the resident/non-physician practitioner and I was immediately available to provide assistance  At this point I agree with the current assessment done in the Emergency Department  I have conducted an independent evaluation of this patient a history and physical is as follows:    Patient is a 80-year-old male who presents to the emergency department for evaluation with chest pain  Lower central chest pain awoke him from sleep between 4 and 5 this morning and has been persistent  He describes a pressure sensation without radiation  He did appreciate some shakiness and notes that he did have a bit of shakiness upon going to bed last night  That seems to have resolved  He repeatedly tells us that his mouth is dry  He denies any other symptoms such as headache, back pain, dyspnea, nausea, vomiting or diaphoresis  He did have difficulty getting out of bed due to his degree of discomfort and relates that he never had so much pain while participating in karate years ago  He denies awareness of any activity which would have led to discomfort  He did consume alcohol 2 days ago though denies daily use or any history of withdrawal   Last night he did accidentally swallow a slice of canned peach without chewing this though did not appreciate any sensation of it having difficulty passing into his stomach  He was able to eat subsequent pieces following this 1  Medical history significant for hypertension and diabetes    He reports having taken all medications regularly with the exception of one of his antihypertensives  He used up his remaining supply and has been working with his insurance company to try and obtain additional quantity  On exam he is hypertensive with blood pressures greater than 200s over 100s  He appears very uncomfortable-often clutching the center of his chest   His breathing is through an open mouth the majority of the time  Mucous membranes appear moist   Heart sounds regular  Lungs clear to auscultation diffusely  No chest tenderness  Abdomen is soft with normoactive bowel sounds and moderate tenderness in the epigastrium  Remainder of abdomen is nontender  Lower extremities nontender and nonedematous with +2 PT pulses  Patient hypertensive, extremely uncomfortable in appearance and with ongoing chest discomfort  EKG reveals nonspecific abnormality-no significant ST elevation or depression  Differential diagnosis includes but is not limited to ACS, hypertensive urgency/emergency, aortic dissection, biliary colic, pancreatitis, CHF exacerbation, PE, gastritis, esophagitis, musculoskeletal etiology  Chest x-ray unremarkable in appearance without widened mediastinum  Treating symptomatically with combination of medications including opioid (coupled with antiemetic) and vasodilator (nitroglycerin)      ED Course         Critical Care Time  Procedures

## 2023-03-06 DIAGNOSIS — F41.9 ANXIETY: ICD-10-CM

## 2023-03-06 RX ORDER — CITALOPRAM 10 MG/1
10 TABLET ORAL DAILY
Qty: 90 TABLET | Refills: 1 | Status: SHIPPED | OUTPATIENT
Start: 2023-03-06

## 2023-04-24 ENCOUNTER — TELEPHONE (OUTPATIENT)
Dept: INTERNAL MEDICINE CLINIC | Facility: CLINIC | Age: 73
End: 2023-04-24

## 2023-04-24 NOTE — TELEPHONE ENCOUNTER
Michelle Ballard from [facility] called stating they have not received the most recent office visit notes or medication list  Please fax them to [phone]

## 2023-04-24 NOTE — TELEPHONE ENCOUNTER
Paperwork was faxed to them on Friday and they asked for recent office notes and medications to please be faxed to [phone]    Thank you

## 2023-04-26 DIAGNOSIS — E11.9 TYPE 2 DIABETES MELLITUS WITHOUT COMPLICATION, WITHOUT LONG-TERM CURRENT USE OF INSULIN (HCC): ICD-10-CM

## 2023-04-26 RX ORDER — METFORMIN HYDROCHLORIDE 500 MG/1
500 TABLET, EXTENDED RELEASE ORAL 2 TIMES DAILY
Qty: 180 TABLET | Refills: 2 | Status: SHIPPED | OUTPATIENT
Start: 2023-04-26

## 2023-04-27 ENCOUNTER — APPOINTMENT (OUTPATIENT)
Dept: LAB | Facility: CLINIC | Age: 73
End: 2023-04-27

## 2023-04-27 DIAGNOSIS — Z00.00 HEALTHCARE MAINTENANCE: ICD-10-CM

## 2023-04-27 DIAGNOSIS — E11.65 TYPE 2 DIABETES MELLITUS WITH HYPERGLYCEMIA, WITHOUT LONG-TERM CURRENT USE OF INSULIN (HCC): ICD-10-CM

## 2023-04-27 DIAGNOSIS — I10 BENIGN ESSENTIAL HYPERTENSION: ICD-10-CM

## 2023-04-27 DIAGNOSIS — Z12.5 PROSTATE CANCER SCREENING: ICD-10-CM

## 2023-04-27 DIAGNOSIS — E55.9 VITAMIN D DEFICIENCY: ICD-10-CM

## 2023-04-27 LAB
25(OH)D3 SERPL-MCNC: 26 NG/ML (ref 30–100)
ALBUMIN SERPL BCP-MCNC: 4 G/DL (ref 3.5–5)
ALP SERPL-CCNC: 38 U/L (ref 46–116)
ALT SERPL W P-5'-P-CCNC: 29 U/L (ref 12–78)
ANION GAP SERPL CALCULATED.3IONS-SCNC: 2 MMOL/L (ref 4–13)
AST SERPL W P-5'-P-CCNC: 16 U/L (ref 5–45)
BACTERIA UR QL AUTO: NORMAL /HPF
BASOPHILS # BLD AUTO: 0.04 THOUSANDS/ΜL (ref 0–0.1)
BASOPHILS NFR BLD AUTO: 1 % (ref 0–1)
BILIRUB SERPL-MCNC: 0.51 MG/DL (ref 0.2–1)
BILIRUB UR QL STRIP: NEGATIVE
BUN SERPL-MCNC: 17 MG/DL (ref 5–25)
CALCIUM SERPL-MCNC: 9 MG/DL (ref 8.3–10.1)
CHLORIDE SERPL-SCNC: 106 MMOL/L (ref 96–108)
CHOLEST SERPL-MCNC: 160 MG/DL
CLARITY UR: CLEAR
CO2 SERPL-SCNC: 28 MMOL/L (ref 21–32)
COLOR UR: YELLOW
CREAT SERPL-MCNC: 1.03 MG/DL (ref 0.6–1.3)
CREAT UR-MCNC: 107 MG/DL
EOSINOPHIL # BLD AUTO: 0.18 THOUSAND/ΜL (ref 0–0.61)
EOSINOPHIL NFR BLD AUTO: 3 % (ref 0–6)
ERYTHROCYTE [DISTWIDTH] IN BLOOD BY AUTOMATED COUNT: 13.6 % (ref 11.6–15.1)
GFR SERPL CREATININE-BSD FRML MDRD: 72 ML/MIN/1.73SQ M
GLUCOSE P FAST SERPL-MCNC: 154 MG/DL (ref 65–99)
GLUCOSE UR STRIP-MCNC: ABNORMAL MG/DL
HCT VFR BLD AUTO: 49.3 % (ref 36.5–49.3)
HDLC SERPL-MCNC: 39 MG/DL
HGB BLD-MCNC: 16.6 G/DL (ref 12–17)
HGB UR QL STRIP.AUTO: NEGATIVE
IMM GRANULOCYTES # BLD AUTO: 0.04 THOUSAND/UL (ref 0–0.2)
IMM GRANULOCYTES NFR BLD AUTO: 1 % (ref 0–2)
KETONES UR STRIP-MCNC: NEGATIVE MG/DL
LDLC SERPL CALC-MCNC: 93 MG/DL (ref 0–100)
LEUKOCYTE ESTERASE UR QL STRIP: ABNORMAL
LYMPHOCYTES # BLD AUTO: 1.55 THOUSANDS/ΜL (ref 0.6–4.47)
LYMPHOCYTES NFR BLD AUTO: 24 % (ref 14–44)
MCH RBC QN AUTO: 29 PG (ref 26.8–34.3)
MCHC RBC AUTO-ENTMCNC: 33.7 G/DL (ref 31.4–37.4)
MCV RBC AUTO: 86 FL (ref 82–98)
MICROALBUMIN UR-MCNC: 20.7 MG/L (ref 0–20)
MICROALBUMIN/CREAT 24H UR: 19 MG/G CREATININE (ref 0–30)
MONOCYTES # BLD AUTO: 0.87 THOUSAND/ΜL (ref 0.17–1.22)
MONOCYTES NFR BLD AUTO: 14 % (ref 4–12)
NEUTROPHILS # BLD AUTO: 3.66 THOUSANDS/ΜL (ref 1.85–7.62)
NEUTS SEG NFR BLD AUTO: 57 % (ref 43–75)
NITRITE UR QL STRIP: NEGATIVE
NON-SQ EPI CELLS URNS QL MICRO: NORMAL /HPF
NONHDLC SERPL-MCNC: 121 MG/DL
NRBC BLD AUTO-RTO: 0 /100 WBCS
PH UR STRIP.AUTO: 7 [PH]
PLATELET # BLD AUTO: 173 THOUSANDS/UL (ref 149–390)
PMV BLD AUTO: 10.7 FL (ref 8.9–12.7)
POTASSIUM SERPL-SCNC: 4.1 MMOL/L (ref 3.5–5.3)
PROT SERPL-MCNC: 7 G/DL (ref 6.4–8.4)
PROT UR STRIP-MCNC: NEGATIVE MG/DL
PSA SERPL-MCNC: 0.6 NG/ML (ref 0–4)
RBC # BLD AUTO: 5.72 MILLION/UL (ref 3.88–5.62)
RBC #/AREA URNS AUTO: NORMAL /HPF
SODIUM SERPL-SCNC: 136 MMOL/L (ref 135–147)
SP GR UR STRIP.AUTO: 1.02 (ref 1–1.03)
TRIGL SERPL-MCNC: 138 MG/DL
UROBILINOGEN UR STRIP-ACNC: <2 MG/DL
WBC # BLD AUTO: 6.34 THOUSAND/UL (ref 4.31–10.16)
WBC #/AREA URNS AUTO: NORMAL /HPF

## 2023-04-30 LAB
EST. AVERAGE GLUCOSE BLD GHB EST-MCNC: 200 MG/DL
HBA1C MFR BLD: 8.6 %

## 2023-05-09 ENCOUNTER — OFFICE VISIT (OUTPATIENT)
Dept: INTERNAL MEDICINE CLINIC | Facility: CLINIC | Age: 73
End: 2023-05-09

## 2023-05-09 VITALS
WEIGHT: 209.8 LBS | DIASTOLIC BLOOD PRESSURE: 76 MMHG | HEIGHT: 70 IN | BODY MASS INDEX: 30.03 KG/M2 | OXYGEN SATURATION: 94 % | HEART RATE: 65 BPM | SYSTOLIC BLOOD PRESSURE: 132 MMHG | TEMPERATURE: 98.3 F

## 2023-05-09 DIAGNOSIS — Z00.00 HEALTHCARE MAINTENANCE: ICD-10-CM

## 2023-05-09 DIAGNOSIS — I10 BENIGN ESSENTIAL HYPERTENSION: Primary | ICD-10-CM

## 2023-05-09 DIAGNOSIS — H53.8 BLURRED VISION, BILATERAL: ICD-10-CM

## 2023-05-09 DIAGNOSIS — E78.2 MIXED HYPERLIPIDEMIA: ICD-10-CM

## 2023-05-09 DIAGNOSIS — F41.9 ANXIETY: ICD-10-CM

## 2023-05-09 DIAGNOSIS — G25.0 ESSENTIAL TREMOR: ICD-10-CM

## 2023-05-09 DIAGNOSIS — E11.65 TYPE 2 DIABETES MELLITUS WITH HYPERGLYCEMIA, WITHOUT LONG-TERM CURRENT USE OF INSULIN (HCC): ICD-10-CM

## 2023-05-09 RX ORDER — CITALOPRAM 20 MG/1
20 TABLET ORAL DAILY
Qty: 90 TABLET | Refills: 3 | Status: SHIPPED | OUTPATIENT
Start: 2023-05-09

## 2023-05-09 NOTE — ASSESSMENT & PLAN NOTE
Patient does have a history of hyperlipidemia  We have attempted to place the patient on statins in the past but unable to tolerate secondary to severe myalgias  He remains on fenofibrate  Check a lipid profile with his next visit consider further modification of treatment if necessary  Told the importance of watching his intake of fats and cholesterol

## 2023-05-09 NOTE — ASSESSMENT & PLAN NOTE
Hemoglobin A1c is still poorly controlled 8 6  Again patient admits that he is not always compliant as far as diet and also relates that he did have an episode where he was not taking his medication on a regular basis for at least a month  His pharmacy told him that we had counseled treatment through our office but we have no verification that this happened  He now has all his medication and we told the patient the importance again of watching his intake of concentrated sweets and simple carbohydrates, check a hemoglobin A1c and fasting blood sugar with his next visit    Lab Results   Component Value Date    HGBA1C 8 6 (H) 04/27/2023

## 2023-05-09 NOTE — ASSESSMENT & PLAN NOTE
Blood pressure is showing adequate control with present treatment  Patient's renal function is also stable  Patient will continue present medication and surveillance and we will continue to modify treatment in the future if necessary    He states he is compliant with medication

## 2023-05-09 NOTE — PROGRESS NOTES
Name: Tana Madrid  : 1950      MRN: [id number]  Encounter Provider: Dafne Chavez DO  Encounter Date: 2023   Encounter department: [address] [place] [address] INTERNAL MEDICINE    Assessment & Plan     1  Benign essential hypertension  Assessment & Plan:  Blood pressure is showing adequate control with present treatment  Patient's renal function is also stable  Patient will continue present medication and surveillance and we will continue to modify treatment in the future if necessary  He states he is compliant with medication    Orders:  -     Ambulatory Referral to Neurology; Future    2  Anxiety  Assessment & Plan:  Has a history of anxiety disorder  Remains on citalopram 10 mg daily and states that he is still having problems with his anxiety level  We will increase this to 20 mg daily and a new prescription was sent to the pharmacy  Orders:  -     citalopram (CeleXA) 20 mg tablet; Take 1 tablet (20 mg total) by mouth daily    3  Essential tremor  Assessment & Plan:  Still has a difficulty with tremor  We had attempted to place the patient on carbo levodopa with thoughts that this may be secondary to Parkinson disease  The medication was stopped and we feel the patient needs an appointment to be seen by neurology  Consult was sent      4  Healthcare maintenance  -     Basic metabolic panel; Future    5  Mixed hyperlipidemia  Assessment & Plan:  Patient does have a history of hyperlipidemia  We have attempted to place the patient on statins in the past but unable to tolerate secondary to severe myalgias  He remains on fenofibrate  Check a lipid profile with his next visit consider further modification of treatment if necessary  Told the importance of watching his intake of fats and cholesterol  6  Type 2 diabetes mellitus with hyperglycemia, without long-term current use of insulin (HCC)  Assessment & Plan:  Hemoglobin A1c is still poorly controlled 8 6    Again patient admits that he is not always compliant as far as diet and also relates that he did have an episode where he was not taking his medication on a regular basis for at least a month  His pharmacy told him that we had counseled treatment through our office but we have no verification that this happened  He now has all his medication and we told the patient the importance again of watching his intake of concentrated sweets and simple carbohydrates, check a hemoglobin A1c and fasting blood sugar with his next visit  Lab Results   Component Value Date    HGBA1C 8 6 (H) 04/27/2023       Orders:  -     Basic metabolic panel; Future  -     Hemoglobin A1C; Future    7  Blurred vision, bilateral  Assessment & Plan:  His optometrist had told the patient to schedule an appointment to be seen by ophthalmology with his cataracts progressing  Patient is complaining of increased blurred vision and attempted to make an appointment to see the ophthalmologist but has not heard back as to an appointment date  Referral was placed and patient will see if he can get an appointment in the near future for further evaluation    Orders:  -     Ambulatory Referral to Ophthalmology; Future           Subjective     Patient is a 71-year-old male history of extensive medical problems outlined previously who is here today for a routine follow-up  He did have extensive lab testing performed prior to the visit today and we did discuss the results at length  Patient relates he has been feeling about the same  Admits that he is exercise capacity is not what he used to be while he is working around the house  Was seen in the emergency room episodes of chest pain and pressure and was ruled out for any acute cardiac ischemia  Patient also relates that he was not taking his medication as directed apparently the pharmacy told him it was canceled through our office  Review of Systems   Constitutional: Negative  HENT: Negative  Eyes: Negative  Respiratory: Negative  Cardiovascular: Negative  Episode of chest pain and pressure now resolved   Gastrointestinal: Negative  Endocrine: Negative  Genitourinary: Negative  Musculoskeletal: Negative  Skin: Negative  Allergic/Immunologic: Negative  Neurological: Positive for tremors  Negative for dizziness, seizures, syncope, facial asymmetry, speech difficulty, weakness, light-headedness, numbness and headaches  Hematological: Negative  Psychiatric/Behavioral: Negative for agitation, behavioral problems, confusion, decreased concentration, dysphoric mood, hallucinations, self-injury, sleep disturbance and suicidal ideas  The patient is nervous/anxious  The patient is not hyperactive  Past Medical History:   Diagnosis Date   • Colon polyp    • DM (diabetes mellitus), type 2 ([facility] )    • Hypertension      Past Surgical History:   Procedure Laterality Date   • COLONOSCOPY     • KNEE SURGERY Right      Family History   Problem Relation Age of Onset   • Diabetes unspecified Mother      Social History     Socioeconomic History   • Marital status: /Civil Union     Spouse name: Not on file   • Number of children: Not on file   • Years of education: Not on file   • Highest education level: Not on file   Occupational History   • Not on file   Tobacco Use   • Smoking status: Never   • Smokeless tobacco: Never   Vaping Use   • Vaping Use: Never used   Substance and Sexual Activity   • Alcohol use: Yes     Alcohol/week: 3 0 standard drinks     Types: 3 Cans of beer per week   • Drug use: Never   • Sexual activity: Not on file   Other Topics Concern   • Not on file   Social History Narrative   • Not on file     Social Determinants of Health     Financial Resource Strain: Low Risk    • Difficulty of Paying Living Expenses: Not very hard   Food Insecurity: Not on file   Transportation Needs: No Transportation Needs   • Lack of Transportation (Medical):  No   • Lack of Transportation (Non-Medical):  No   Physical Activity: Not on file   Stress: Not on file   Social Connections: Not on file   Intimate Partner Violence: Not on file   Housing Stability: Not on file     Current Outpatient Medications on File Prior to Visit   Medication Sig   • aspirin 81 mg chewable tablet Chew 1 tablet daily   • benzonatate (TESSALON PERLES) 100 mg capsule Take 1 capsule (100 mg total) by mouth 3 (three) times a day as needed for cough   • bisoprolol-hydrochlorothiazide (ZIAC) 5-6 25 MG per tablet Take 1 tablet by mouth daily   • citalopram (CeleXA) 10 mg tablet Take 1 tablet (10 mg total) by mouth daily   • fenofibrate (TRICOR) 145 mg tablet Take 1 tablet (145 mg total) by mouth daily   • glimepiride (AMARYL) 4 mg tablet Take 1 tablet (4 mg total) by mouth daily   • glucose blood (ACCU-CHEK GUIDE) test strip Use to test blood sugars twice daily as instructed   • Lancets MISC by Does not apply route 2 (two) times a day   • losartan (COZAAR) 50 mg tablet Take 1 tablet (50 mg total) by mouth daily   • metFORMIN (GLUCOPHAGE-XR) 500 mg 24 hr tablet Take 1 tablet (500 mg total) by mouth 2 (two) times a day   • sitaGLIPtin (JANUVIA) 100 mg tablet Take 1 tablet (100 mg total) by mouth daily (Patient not taking: Reported on 3/5/2023)   • [DISCONTINUED] carbidopa-levodopa (SINEMET)  mg per tablet Take 1 tablet by mouth 2 (two) times a day (Patient taking differently: Take 1 tablet by mouth daily )     No Known Allergies  Immunization History   Administered Date(s) Administered   • COVID-19 MODERNA VACC 0 5 ML IM 03/19/2021   • INFLUENZA 10/13/2022   • Influenza Quadrivalent Preservative Free 3 years and older IM 09/29/2014   • Influenza Quadrivalent, 6-35 Months IM 09/18/2015   • Influenza Split High Dose Preservative Free IM 10/19/2016, 09/15/2017   • Influenza, high dose seasonal 0 7 mL 10/07/2019, 11/13/2020, 10/21/2021, 10/13/2022   • Influenza, injectable, quadrivalent, preservative free 0 5 mL 09/29/2018   • "Influenza, seasonal, injectable 09/20/2012, 09/24/2013   • Pneumococcal Conjugate 13-Valent 10/19/2016       Objective     /76 (BP Location: Left arm, Patient Position: Sitting)   Pulse 65   Temp 98 3 °F (36 8 °C) (Tympanic)   Ht 5' 10\" (1 778 m)   Wt 95 2 kg (209 lb 12 8 oz)   SpO2 94%   BMI 30 10 kg/m²     Physical Exam  Vitals and nursing note reviewed  Constitutional:       General: He is not in acute distress  Appearance: Normal appearance  He is obese  He is not ill-appearing, toxic-appearing or diaphoretic  Comments: Pleasant, mildly anxious but articulate 71-year-old male who is awake alert in no acute distress and oriented x3   HENT:      Head: Normocephalic and atraumatic  Right Ear: Tympanic membrane, ear canal and external ear normal  There is no impacted cerumen  Left Ear: Tympanic membrane, ear canal and external ear normal  There is no impacted cerumen  Nose: Nose normal  No congestion or rhinorrhea  Mouth/Throat:      Mouth: Mucous membranes are moist       Pharynx: Oropharynx is clear  No oropharyngeal exudate or posterior oropharyngeal erythema  Comments: Thick, tongue  Eyes:      General: No scleral icterus  Right eye: No discharge  Left eye: No discharge  Extraocular Movements: Extraocular movements intact  Conjunctiva/sclera: Conjunctivae normal       Pupils: Pupils are equal, round, and reactive to light  Neck:      Vascular: No carotid bruit  Cardiovascular:      Rate and Rhythm: Normal rate and regular rhythm  Pulses: Normal pulses  Heart sounds: Normal heart sounds  No murmur heard  No friction rub  No gallop  Pulmonary:      Effort: Pulmonary effort is normal  No respiratory distress  Breath sounds: Normal breath sounds  No stridor  No wheezing, rhonchi or rales  Chest:      Chest wall: No tenderness  Abdominal:      General: Bowel sounds are normal  There is no distension        Palpations: " Abdomen is soft  There is no mass  Tenderness: There is no abdominal tenderness  There is no right CVA tenderness, left CVA tenderness, guarding or rebound  Hernia: No hernia is present  Musculoskeletal:         General: No swelling, tenderness, deformity or signs of injury  Normal range of motion  Cervical back: Normal range of motion and neck supple  No rigidity or tenderness  Right lower leg: No edema  Left lower leg: No edema  Lymphadenopathy:      Cervical: No cervical adenopathy  Skin:     General: Skin is warm and dry  Capillary Refill: Capillary refill takes less than 2 seconds  Coloration: Skin is not jaundiced or pale  Findings: No bruising, erythema, lesion or rash  Neurological:      General: No focal deficit present  Mental Status: He is alert and oriented to person, place, and time  Mental status is at baseline  Cranial Nerves: No cranial nerve deficit  Sensory: No sensory deficit  Motor: No weakness  Coordination: Coordination normal       Gait: Gait normal       Deep Tendon Reflexes: Reflexes normal    Psychiatric:         Behavior: Behavior normal          Thought Content: Thought content normal          Judgment: Judgment normal       Comments: Admits to having some anxiety         Veverly Reddish, DO

## 2023-05-09 NOTE — ASSESSMENT & PLAN NOTE
Still has a difficulty with tremor  We had attempted to place the patient on carbo levodopa with thoughts that this may be secondary to Parkinson disease  The medication was stopped and we feel the patient needs an appointment to be seen by neurology    Consult was sent

## 2023-05-09 NOTE — ASSESSMENT & PLAN NOTE
Has a history of anxiety disorder  Remains on citalopram 10 mg daily and states that he is still having problems with his anxiety level  We will increase this to 20 mg daily and a new prescription was sent to the pharmacy

## 2023-05-09 NOTE — ASSESSMENT & PLAN NOTE
His optometrist had told the patient to schedule an appointment to be seen by ophthalmology with his cataracts progressing  Patient is complaining of increased blurred vision and attempted to make an appointment to see the ophthalmologist but has not heard back as to an appointment date    Referral was placed and patient will see if he can get an appointment in the near future for further evaluation

## 2023-07-06 ENCOUNTER — ESTABLISHED COMPREHENSIVE EXAM (OUTPATIENT)
Dept: URBAN - METROPOLITAN AREA CLINIC 6 | Facility: CLINIC | Age: 73
End: 2023-07-06

## 2023-07-06 DIAGNOSIS — E11.9: ICD-10-CM

## 2023-07-06 DIAGNOSIS — H25.813: ICD-10-CM

## 2023-07-06 LAB
LEFT EYE DIABETIC RETINOPATHY: NORMAL
RIGHT EYE DIABETIC RETINOPATHY: NORMAL

## 2023-07-06 PROCEDURE — 92014 COMPRE OPH EXAM EST PT 1/>: CPT

## 2023-07-06 ASSESSMENT — VISUAL ACUITY
OS_CC: 20/50
OD_CC: 20/50

## 2023-07-06 ASSESSMENT — TONOMETRY
OS_IOP_MMHG: 17
OD_IOP_MMHG: 11

## 2023-07-21 ENCOUNTER — RA CDI HCC (OUTPATIENT)
Dept: OTHER | Facility: HOSPITAL | Age: 73
End: 2023-07-21

## 2023-07-21 NOTE — PROGRESS NOTES
[address] [facility] coding opportunities          Chart Reviewed number of suggestions sent to Provider: 1     Patients Insurance     Medicare Insurance: [address] [place] [address]

## 2023-07-31 ENCOUNTER — OFFICE VISIT (OUTPATIENT)
Dept: INTERNAL MEDICINE CLINIC | Facility: CLINIC | Age: 73
End: 2023-07-31
Payer: COMMERCIAL

## 2023-07-31 VITALS
HEART RATE: 58 BPM | DIASTOLIC BLOOD PRESSURE: 86 MMHG | OXYGEN SATURATION: 96 % | TEMPERATURE: 98.1 F | WEIGHT: 209.1 LBS | BODY MASS INDEX: 29.94 KG/M2 | HEIGHT: 70 IN | SYSTOLIC BLOOD PRESSURE: 148 MMHG

## 2023-07-31 DIAGNOSIS — J01.91 ACUTE RECURRENT SINUSITIS, UNSPECIFIED LOCATION: Primary | ICD-10-CM

## 2023-07-31 DIAGNOSIS — H25.9 AGE-RELATED CATARACT OF BOTH EYES, UNSPECIFIED AGE-RELATED CATARACT TYPE: ICD-10-CM

## 2023-07-31 DIAGNOSIS — I10 BENIGN ESSENTIAL HYPERTENSION: ICD-10-CM

## 2023-07-31 DIAGNOSIS — E11.65 TYPE 2 DIABETES MELLITUS WITH HYPERGLYCEMIA, WITHOUT LONG-TERM CURRENT USE OF INSULIN (HCC): ICD-10-CM

## 2023-07-31 PROBLEM — G20.A1 PARKINSON'S DISEASE: Status: ACTIVE | Noted: 2023-07-31

## 2023-07-31 PROBLEM — G20 PARKINSON'S DISEASE (HCC): Status: ACTIVE | Noted: 2023-07-31

## 2023-07-31 PROCEDURE — 99214 OFFICE O/P EST MOD 30 MIN: CPT | Performed by: INTERNAL MEDICINE

## 2023-07-31 RX ORDER — AZITHROMYCIN 250 MG/1
TABLET, FILM COATED ORAL
Qty: 6 TABLET | Refills: 0 | Status: SHIPPED | OUTPATIENT
Start: 2023-07-31 | End: 2023-08-04

## 2023-07-31 NOTE — PROGRESS NOTES
Name: Farzana Luna. : 1950      MRN: [id number]  Encounter Provider: Yazmin Omalley DO  Encounter Date: 2023   Encounter department: [facility]    Assessment & Plan     1. Acute recurrent sinusitis, unspecified location  Assessment & Plan:  Patient is complaining of some increasing nasal congestion postnasal drip. On evaluation today patient does have inflammation to nasal mucosa and nasal turbinates bilaterally. Thick whitish postnasal drip. Lungs are clear to auscultation. Patient has a history of recurrent sinusitis and has done extremely well with azithromycin Z-Epifanio's. New prescription was sent to the pharmacy for the patient to take as needed if worsening symptoms    Orders:  -     azithromycin (ZITHROMAX) 250 mg tablet; Take 2 tablets today then 1 tablet daily x 4 days    2. Age-related cataract of both eyes, unspecified age-related cataract type  Assessment & Plan:  Patient is here today for preop evaluation prior to undergoing cataract surgery bilaterally. He is complaining of decreased visual acuity and was seen by ophthalmology and arrangements have been made for surgery to be performed in the near future. He did not have any labs performed prior to the visit and no further testing preoperatively is needed. 3. Benign essential hypertension  Assessment & Plan:  Overall patient's blood pressure has been under relatively good control but has noted slightly elevated today in the office. Patient will continue present medication and surveillance and we will continue to make adjustments in medication if needed in the future. 4. Type 2 diabetes mellitus with hyperglycemia, without long-term current use of insulin (HCC)  Assessment & Plan:  History of diabetes mellitus type 2. We have had some difficulties with patient and control of his blood sugars especially with him being noncompliant.   Patient apparently has stopped his Januvia which we had started the patient on in order to control his blood sugar readings. He has a slip to check on a fasting blood sugar and hemoglobin A1c in the near future. We will continue to monitor and modify treatment in the future. Admits that he is not always compliant with his diet. Lab Results   Component Value Date    HGBA1C 8.6 (H) 04/27/2023                Subjective     Patient is a 71-year-old male history of extensive medical problems outlined previously who is here today for preop evaluation prior to undergoing cataract surgery bilaterally. Is complaining of decreased visual acuity secondary to cataracts and this has been verified by his ophthalmologist.    Review of Systems   Constitutional: Negative. HENT: Positive for congestion and postnasal drip. Negative for dental problem, drooling, ear discharge, ear pain, facial swelling, hearing loss, mouth sores, nosebleeds, rhinorrhea, sinus pressure, sinus pain, sneezing, sore throat, tinnitus, trouble swallowing and voice change. Eyes: Positive for visual disturbance. Negative for photophobia, pain, discharge, redness and itching. Respiratory: Negative. Cardiovascular: Negative. Gastrointestinal: Negative. Endocrine: Negative. Genitourinary: Negative. Musculoskeletal: Negative. Skin: Negative. Allergic/Immunologic: Negative. Neurological: Negative. Hematological: Negative. Psychiatric/Behavioral: Negative.         Past Medical History:   Diagnosis Date   • Colon polyp    • DM (diabetes mellitus), type 2 ([address])    • Hypertension      Past Surgical History:   Procedure Laterality Date   • COLONOSCOPY     • KNEE SURGERY Right      Family History   Problem Relation Age of Onset   • Diabetes unspecified Mother      Social History     Socioeconomic History   • Marital status: /Civil Union     Spouse name: None   • Number of children: None   • Years of education: None   • Highest education level: None   Occupational History • None   Tobacco Use   • Smoking status: Never   • Smokeless tobacco: Never   Vaping Use   • Vaping Use: Never used   Substance and Sexual Activity   • Alcohol use: Yes     Alcohol/week: 3.0 standard drinks of alcohol     Types: 3 Cans of beer per week   • Drug use: Never   • Sexual activity: None   Other Topics Concern   • None   Social History Narrative   • None     Social Determinants of Health     Financial Resource Strain: Low Risk  (9/8/2022)    Overall Financial Resource Strain (CARDIA)    • Difficulty of Paying Living Expenses: Not very hard   Food Insecurity: Not on file   Transportation Needs: No Transportation Needs (9/8/2022)    PRAPARE - Transportation    • Lack of Transportation (Medical): No    • Lack of Transportation (Non-Medical):  No   Physical Activity: Not on file   Stress: Not on file   Social Connections: Not on file   Intimate Partner Violence: Not on file   Housing Stability: Not on file     Current Outpatient Medications on File Prior to Visit   Medication Sig   • benzonatate (TESSALON PERLES) 100 mg capsule Take 1 capsule (100 mg total) by mouth 3 (three) times a day as needed for cough   • bisoprolol-hydrochlorothiazide (ZIAC) 5-6.25 MG per tablet Take 1 tablet by mouth daily   • citalopram (CeleXA) 20 mg tablet Take 1 tablet (20 mg total) by mouth daily   • fenofibrate (TRICOR) 145 mg tablet Take 1 tablet (145 mg total) by mouth daily   • glimepiride (AMARYL) 4 mg tablet Take 1 tablet (4 mg total) by mouth daily   • glucose blood (ACCU-CHEK GUIDE) test strip Use to test blood sugars twice daily as instructed   • Lancets MISC by Does not apply route 2 (two) times a day   • losartan (COZAAR) 50 mg tablet Take 1 tablet (50 mg total) by mouth daily   • metFORMIN (GLUCOPHAGE-XR) 500 mg 24 hr tablet Take 1 tablet (500 mg total) by mouth 2 (two) times a day   • aspirin 81 mg chewable tablet Chew 1 tablet daily (Patient not taking: Reported on 7/31/2023)   • citalopram (CeleXA) 10 mg tablet Take 1 tablet (10 mg total) by mouth daily (Patient not taking: Reported on 7/31/2023)   • sitaGLIPtin (JANUVIA) 100 mg tablet Take 1 tablet (100 mg total) by mouth daily (Patient not taking: Reported on 3/5/2023)   • [DISCONTINUED] carbidopa-levodopa (SINEMET)  mg per tablet Take 1 tablet by mouth 2 (two) times a day (Patient taking differently: Take 1 tablet by mouth daily )     No Known Allergies  Immunization History   Administered Date(s) Administered   • COVID-19 MODERNA VACC 0.5 ML IM 03/19/2021   • INFLUENZA 10/13/2022   • Influenza Quadrivalent Preservative Free 3 years and older IM 09/29/2014   • Influenza Quadrivalent, 6-35 Months IM 09/18/2015   • Influenza Split High Dose Preservative Free IM 10/19/2016, 09/15/2017   • Influenza, high dose seasonal 0.7 mL 10/07/2019, 11/13/2020, 10/21/2021, 10/13/2022   • Influenza, injectable, quadrivalent, preservative free 0.5 mL 09/29/2018   • Influenza, seasonal, injectable 09/20/2012, 09/24/2013   • Pneumococcal Conjugate 13-Valent 10/19/2016       Objective     /86 (BP Location: Left arm, Patient Position: Sitting, Cuff Size: Standard)   Pulse 58   Temp 98.1 °F (36.7 °C) (Tympanic Core)   Ht 5' 10" (1.778 m)   Wt 94.8 kg (209 lb 1.6 oz)   SpO2 96%   BMI 30.00 kg/m²     Physical Exam  Vitals and nursing note reviewed. Constitutional:       General: He is not in acute distress. Appearance: Normal appearance. He is obese. He is not ill-appearing, toxic-appearing or diaphoretic. Comments: Pleasant, mildly anxious but articulate 67-year-old male who is awake alert in no acute distress and oriented x3   HENT:      Head: Normocephalic and atraumatic. Right Ear: Tympanic membrane, ear canal and external ear normal. There is no impacted cerumen. Left Ear: Tympanic membrane, ear canal and external ear normal. There is no impacted cerumen. Nose: Congestion and rhinorrhea present.       Comments: Inflamed turbinates laterally with a clear nasal discharge     Mouth/Throat:      Mouth: Mucous membranes are moist.      Pharynx: Oropharyngeal exudate and posterior oropharyngeal erythema present. Comments: Thick, tongue, positive erythema to posterior airway with a thick whitish to yellow postnasal drip  Eyes:      General: No scleral icterus. Right eye: No discharge. Left eye: No discharge. Extraocular Movements: Extraocular movements intact. Conjunctiva/sclera: Conjunctivae normal.      Pupils: Pupils are equal, round, and reactive to light. Comments: Bilateral dense cataracts   Neck:      Vascular: No carotid bruit. Cardiovascular:      Rate and Rhythm: Normal rate and regular rhythm. Pulses: Normal pulses. Heart sounds: Normal heart sounds. No murmur heard. No friction rub. No gallop. Pulmonary:      Effort: Pulmonary effort is normal. No respiratory distress. Breath sounds: Normal breath sounds. No stridor. No wheezing, rhonchi or rales. Chest:      Chest wall: No tenderness. Abdominal:      General: Bowel sounds are normal. There is no distension. Palpations: Abdomen is soft. There is no mass. Tenderness: There is no abdominal tenderness. There is no right CVA tenderness, left CVA tenderness, guarding or rebound. Hernia: No hernia is present. Musculoskeletal:         General: No swelling, tenderness, deformity or signs of injury. Normal range of motion. Cervical back: Normal range of motion and neck supple. No rigidity or tenderness. Right lower leg: No edema. Left lower leg: No edema. Lymphadenopathy:      Cervical: No cervical adenopathy. Skin:     General: Skin is warm and dry. Capillary Refill: Capillary refill takes less than 2 seconds. Coloration: Skin is not jaundiced or pale. Findings: No bruising, erythema, lesion or rash. Neurological:      General: No focal deficit present.       Mental Status: He is alert and oriented to person, place, and time. Mental status is at baseline. Cranial Nerves: No cranial nerve deficit. Sensory: No sensory deficit. Motor: No weakness. Coordination: Coordination normal.      Gait: Gait normal.      Deep Tendon Reflexes: Reflexes normal.   Psychiatric:         Behavior: Behavior normal.         Thought Content: Thought content normal.         Judgment: Judgment normal.      Comments: Admits to having some anxiety.        Rebecca Curran, DO

## 2023-07-31 NOTE — ASSESSMENT & PLAN NOTE
Patient is here today for preop evaluation prior to undergoing cataract surgery bilaterally. He is complaining of decreased visual acuity and was seen by ophthalmology and arrangements have been made for surgery to be performed in the near future. He did not have any labs performed prior to the visit and no further testing preoperatively is needed.

## 2023-07-31 NOTE — ASSESSMENT & PLAN NOTE
History of diabetes mellitus type 2. We have had some difficulties with patient and control of his blood sugars especially with him being noncompliant. Patient apparently has stopped his Januvia which we had started the patient on in order to control his blood sugar readings. He has a slip to check on a fasting blood sugar and hemoglobin A1c in the near future. We will continue to monitor and modify treatment in the future. Admits that he is not always compliant with his diet.   Lab Results   Component Value Date    HGBA1C 8.6 (H) 04/27/2023

## 2023-07-31 NOTE — ASSESSMENT & PLAN NOTE
Patient is complaining of some increasing nasal congestion postnasal drip. On evaluation today patient does have inflammation to nasal mucosa and nasal turbinates bilaterally. Thick whitish postnasal drip. Lungs are clear to auscultation. Patient has a history of recurrent sinusitis and has done extremely well with azithromycin Z-Epifanio's.   New prescription was sent to the pharmacy for the patient to take as needed if worsening symptoms

## 2023-07-31 NOTE — ASSESSMENT & PLAN NOTE
Overall patient's blood pressure has been under relatively good control but has noted slightly elevated today in the office. Patient will continue present medication and surveillance and we will continue to make adjustments in medication if needed in the future.

## 2023-08-03 ENCOUNTER — PRE-OP CATARACT MEASUREMENTS (OUTPATIENT)
Dept: URBAN - METROPOLITAN AREA CLINIC 6 | Facility: CLINIC | Age: 73
End: 2023-08-03

## 2023-08-03 DIAGNOSIS — H25.813: ICD-10-CM

## 2023-08-03 DIAGNOSIS — E11.9: ICD-10-CM

## 2023-08-03 PROCEDURE — 92014 COMPRE OPH EXAM EST PT 1/>: CPT

## 2023-08-03 PROCEDURE — 92136 OPHTHALMIC BIOMETRY: CPT

## 2023-08-03 ASSESSMENT — TONOMETRY
OS_IOP_MMHG: 17
OD_IOP_MMHG: 13

## 2023-08-03 ASSESSMENT — VISUAL ACUITY
OU_CC: J1
OS_CC: 20/30
OS_GLARE: 20/60
OD_CC: 20/30
OD_GLARE: 20/70

## 2023-08-14 ENCOUNTER — SURGERY/PROCEDURE (OUTPATIENT)
Dept: URBAN - METROPOLITAN AREA SURGICAL CENTER 6 | Facility: SURGICAL CENTER | Age: 73
End: 2023-08-14

## 2023-08-14 DIAGNOSIS — H25.811: ICD-10-CM

## 2023-08-14 PROCEDURE — 66984 XCAPSL CTRC RMVL W/O ECP: CPT

## 2023-08-15 ENCOUNTER — 1 DAY POST-OP (OUTPATIENT)
Dept: URBAN - METROPOLITAN AREA CLINIC 6 | Facility: CLINIC | Age: 73
End: 2023-08-15

## 2023-08-15 DIAGNOSIS — H25.812: ICD-10-CM

## 2023-08-15 DIAGNOSIS — Z96.1: ICD-10-CM

## 2023-08-15 PROCEDURE — 99024 POSTOP FOLLOW-UP VISIT: CPT

## 2023-08-15 PROCEDURE — 76519 ECHO EXAM OF EYE: CPT | Mod: 26,LT

## 2023-08-15 ASSESSMENT — VISUAL ACUITY
OD_SC: 20/30-2
OS_GLARE: 20/60
OS_CC: 20/30

## 2023-08-15 ASSESSMENT — TONOMETRY
OS_IOP_MMHG: 15
OD_IOP_MMHG: 13

## 2023-08-22 ENCOUNTER — 1 WEEK POST-OP (OUTPATIENT)
Dept: URBAN - METROPOLITAN AREA CLINIC 6 | Facility: CLINIC | Age: 73
End: 2023-08-22

## 2023-08-22 DIAGNOSIS — Z96.1: ICD-10-CM

## 2023-08-22 PROCEDURE — 99024 POSTOP FOLLOW-UP VISIT: CPT

## 2023-08-22 ASSESSMENT — VISUAL ACUITY
OD_SC: 20/30-2
OS_CC: 20/40

## 2023-08-22 ASSESSMENT — TONOMETRY
OD_IOP_MMHG: 14
OS_IOP_MMHG: 18

## 2023-08-28 ENCOUNTER — SURGERY/PROCEDURE (OUTPATIENT)
Dept: URBAN - METROPOLITAN AREA SURGICAL CENTER 6 | Facility: SURGICAL CENTER | Age: 73
End: 2023-08-28

## 2023-08-28 DIAGNOSIS — E11.9 TYPE 2 DIABETES MELLITUS WITHOUT COMPLICATION, WITHOUT LONG-TERM CURRENT USE OF INSULIN (HCC): ICD-10-CM

## 2023-08-28 DIAGNOSIS — H25.812: ICD-10-CM

## 2023-08-28 DIAGNOSIS — I10 ESSENTIAL HYPERTENSION: ICD-10-CM

## 2023-08-28 DIAGNOSIS — F41.9 ANXIETY: ICD-10-CM

## 2023-08-28 PROCEDURE — 66984 XCAPSL CTRC RMVL W/O ECP: CPT | Mod: 79,LT

## 2023-08-28 RX ORDER — CITALOPRAM HYDROBROMIDE 10 MG/1
10 TABLET ORAL DAILY
Qty: 90 TABLET | Refills: 1 | Status: SHIPPED | OUTPATIENT
Start: 2023-08-28

## 2023-08-28 RX ORDER — LOSARTAN POTASSIUM 50 MG/1
50 TABLET ORAL DAILY
Qty: 90 TABLET | Refills: 3 | Status: SHIPPED | OUTPATIENT
Start: 2023-08-28

## 2023-08-28 RX ORDER — FENOFIBRATE 145 MG/1
145 TABLET, COATED ORAL DAILY
Qty: 90 TABLET | Refills: 3 | Status: SHIPPED | OUTPATIENT
Start: 2023-08-28

## 2023-08-29 ENCOUNTER — 1 DAY POST-OP (OUTPATIENT)
Dept: URBAN - METROPOLITAN AREA CLINIC 6 | Facility: CLINIC | Age: 73
End: 2023-08-29

## 2023-08-29 DIAGNOSIS — Z96.1: ICD-10-CM

## 2023-08-29 PROCEDURE — 99024 POSTOP FOLLOW-UP VISIT: CPT

## 2023-08-29 ASSESSMENT — TONOMETRY
OS_IOP_MMHG: 18
OD_IOP_MMHG: 14

## 2023-08-29 ASSESSMENT — VISUAL ACUITY
OS_SC: 20/40
OD_SC: 20/30

## 2023-09-05 ENCOUNTER — 1 WEEK POST-OP (OUTPATIENT)
Dept: URBAN - METROPOLITAN AREA CLINIC 6 | Facility: CLINIC | Age: 73
End: 2023-09-05

## 2023-09-05 DIAGNOSIS — Z96.1: ICD-10-CM

## 2023-09-05 PROCEDURE — 99024 POSTOP FOLLOW-UP VISIT: CPT

## 2023-09-05 ASSESSMENT — TONOMETRY
OS_IOP_MMHG: 14
OD_IOP_MMHG: 12

## 2023-09-05 ASSESSMENT — VISUAL ACUITY
OS_PH: 20/20
OD_SC: 20/30
OS_SC: 20/25+2

## 2023-09-20 ENCOUNTER — APPOINTMENT (OUTPATIENT)
Dept: LAB | Facility: CLINIC | Age: 73
End: 2023-09-20
Payer: COMMERCIAL

## 2023-09-20 DIAGNOSIS — Z00.00 HEALTHCARE MAINTENANCE: ICD-10-CM

## 2023-09-20 DIAGNOSIS — E11.65 TYPE 2 DIABETES MELLITUS WITH HYPERGLYCEMIA, WITHOUT LONG-TERM CURRENT USE OF INSULIN (HCC): ICD-10-CM

## 2023-09-20 LAB
ANION GAP SERPL CALCULATED.3IONS-SCNC: 6 MMOL/L
BUN SERPL-MCNC: 17 MG/DL (ref 5–25)
CALCIUM SERPL-MCNC: 9.3 MG/DL (ref 8.4–10.2)
CHLORIDE SERPL-SCNC: 102 MMOL/L (ref 96–108)
CO2 SERPL-SCNC: 29 MMOL/L (ref 21–32)
CREAT SERPL-MCNC: 1.06 MG/DL (ref 0.6–1.3)
EST. AVERAGE GLUCOSE BLD GHB EST-MCNC: 194 MG/DL
GFR SERPL CREATININE-BSD FRML MDRD: 69 ML/MIN/1.73SQ M
GLUCOSE P FAST SERPL-MCNC: 174 MG/DL (ref 65–99)
HBA1C MFR BLD: 8.4 %
POTASSIUM SERPL-SCNC: 4.5 MMOL/L (ref 3.5–5.3)
SODIUM SERPL-SCNC: 137 MMOL/L (ref 135–147)

## 2023-09-20 PROCEDURE — 80048 BASIC METABOLIC PNL TOTAL CA: CPT

## 2023-09-20 PROCEDURE — 36415 COLL VENOUS BLD VENIPUNCTURE: CPT

## 2023-09-20 PROCEDURE — 83036 HEMOGLOBIN GLYCOSYLATED A1C: CPT

## 2023-09-27 ENCOUNTER — OFFICE VISIT (OUTPATIENT)
Dept: INTERNAL MEDICINE CLINIC | Facility: CLINIC | Age: 73
End: 2023-09-27
Payer: COMMERCIAL

## 2023-09-27 VITALS
DIASTOLIC BLOOD PRESSURE: 86 MMHG | WEIGHT: 211 LBS | RESPIRATION RATE: 16 BRPM | SYSTOLIC BLOOD PRESSURE: 148 MMHG | TEMPERATURE: 97.1 F | BODY MASS INDEX: 31.25 KG/M2 | HEIGHT: 69 IN | HEART RATE: 61 BPM | OXYGEN SATURATION: 95 %

## 2023-09-27 DIAGNOSIS — I10 BENIGN ESSENTIAL HYPERTENSION: ICD-10-CM

## 2023-09-27 DIAGNOSIS — E55.9 VITAMIN D DEFICIENCY: ICD-10-CM

## 2023-09-27 DIAGNOSIS — Z00.00 HEALTHCARE MAINTENANCE: ICD-10-CM

## 2023-09-27 DIAGNOSIS — E11.65 TYPE 2 DIABETES MELLITUS WITH HYPERGLYCEMIA, WITHOUT LONG-TERM CURRENT USE OF INSULIN (HCC): ICD-10-CM

## 2023-09-27 DIAGNOSIS — E78.2 MIXED HYPERLIPIDEMIA: ICD-10-CM

## 2023-09-27 DIAGNOSIS — Z23 ENCOUNTER FOR IMMUNIZATION: Primary | ICD-10-CM

## 2023-09-27 PROCEDURE — G0008 ADMIN INFLUENZA VIRUS VAC: HCPCS | Performed by: INTERNAL MEDICINE

## 2023-09-27 PROCEDURE — 90662 IIV NO PRSV INCREASED AG IM: CPT | Performed by: INTERNAL MEDICINE

## 2023-09-27 PROCEDURE — 99214 OFFICE O/P EST MOD 30 MIN: CPT | Performed by: INTERNAL MEDICINE

## 2023-09-27 PROCEDURE — G0439 PPPS, SUBSEQ VISIT: HCPCS | Performed by: INTERNAL MEDICINE

## 2023-09-27 RX ORDER — KETOROLAC TROMETHAMINE 5 MG/ML
SOLUTION OPHTHALMIC
COMMUNITY
Start: 2023-08-24

## 2023-09-27 RX ORDER — PREDNISOLONE ACETATE 10 MG/ML
SUSPENSION/ DROPS OPHTHALMIC
COMMUNITY
Start: 2023-08-24

## 2023-09-27 NOTE — ASSESSMENT & PLAN NOTE
Diabetes mellitus type 2. Hemoglobin A1c is now 8.4 which has gone down but is still not to goal.  He was told I want to see his hemoglobin A1c less than 7 and we did discuss starting him on insulin dose which he refuses. Patient was told for his health and welfare in the future is extremely important that we keep his blood sugar under better control. He is compliant with medication but again as an alternative we did discuss possibly seeing an endocrinologist again he refuses this.   Check a fasting blood sugar hemoglobin A1c with his next visit  Lab Results   Component Value Date    HGBA1C 8.4 (H) 09/20/2023

## 2023-09-27 NOTE — ASSESSMENT & PLAN NOTE
Patient has an elevation of blood pressure. His blood pressure results have been consistent. We will continue with present medication and surveillance and check a blood pressure with his next visit and make changes of medication if needed.

## 2023-09-27 NOTE — PROGRESS NOTES
Name: Jan Strong. : 1950      MRN: [id number]  Encounter Provider: Jairo Cunningham DO  Encounter Date: 2023   Encounter department: [facility]    Assessment & Plan     1. Encounter for immunization  -     influenza vaccine, high-dose, PF 0.7 mL (FLUZONE HIGH-DOSE)    2. Benign essential hypertension  Assessment & Plan:  Patient has an elevation of blood pressure. His blood pressure results have been consistent. We will continue with present medication and surveillance and check a blood pressure with his next visit and make changes of medication if needed. Orders:  -     Basic metabolic panel; Future    3. Healthcare maintenance  Assessment & Plan:  Patient is a 61-year-old male who is here today for repeat Medicare wellness visit. He did have labs performed prior to the visit today we did discuss the results. His sugar is still poorly controlled patient refuses to make any adjustments as far as diet. Physically and mentally states he is feeling well and did have a visit with a nurse from his insurance company who told him he was in great shape. He has no new complaints and did undergo physical exam today in the office with findings as noted. He will continue with present medication but we stressed the importance of working harder on his diet trying to eliminate not just concentrated sweets and simple carbohydrates but calories overall and weight loss. Check a fasting blood sugar hemoglobin A1c with his next visit return to the office in approximately 4 months. 4. Type 2 diabetes mellitus with hyperglycemia, without long-term current use of insulin ([facility])  Assessment & Plan:  Diabetes mellitus type 2. Hemoglobin A1c is now 8.4 which has gone down but is still not to goal.  He was told I want to see his hemoglobin A1c less than 7 and we did discuss starting him on insulin dose which he refuses.   Patient was told for his health and welfare in the future is extremely important that we keep his blood sugar under better control. He is compliant with medication but again as an alternative we did discuss possibly seeing an endocrinologist again he refuses this. Check a fasting blood sugar hemoglobin A1c with his next visit  Lab Results   Component Value Date    HGBA1C 8.4 (H) 09/20/2023       Orders:  -     Hemoglobin A1C; Future    5. Vitamin D deficiency  Assessment & Plan:  Continues to take supplements, recheck levels    Orders:  -     Vitamin D 25 hydroxy; Future    6. Mixed hyperlipidemia  Assessment & Plan:  History of hyperlipidemia. Because of intolerance to statin drugs he has remained on fenofibrate. He admits he is not always perfect with his diet but again we discussed the importance of reducing his intake of fats and cholesterol with his diet. Continue to monitor his cholesterol and make changes with treatment if needed. Again we have tried him on multiple statins unsuccessfully. Orders:  -     Lipid panel; Future           Subjective     Patient is a 71-year-old male history of multiple medical problems outlined previously who is here today for a repeat Medicare wellness visit. Patient did have labs performed prior to the visit today and we did discuss the results. States in general he is feeling well physically and mentally and offers no new medical complaints or concerns. States he recently had a visit with a nurse from his medical plan and she told him he was in great shape although she did not offer him any points as far as diet or exercise or weight reduction which we feel is important. Review of Systems   Constitutional: Negative. HENT: Negative. Eyes: Negative. States he is pleased with his vision after cataract surgery   Respiratory: Negative. Cardiovascular: Negative. Gastrointestinal: Negative. Endocrine: Negative. Genitourinary: Negative. Musculoskeletal: Negative. Skin: Negative. Allergic/Immunologic: Negative. Neurological: Negative. Hematological: Negative. Psychiatric/Behavioral: Negative. Past Medical History:   Diagnosis Date   • Colon polyp    • DM (diabetes mellitus), type 2 ([address])    • Hypertension      Past Surgical History:   Procedure Laterality Date   • COLONOSCOPY     • KNEE SURGERY Right      Family History   Problem Relation Age of Onset   • Diabetes unspecified Mother      Social History     Socioeconomic History   • Marital status: /Civil Union     Spouse name: None   • Number of children: None   • Years of education: None   • Highest education level: None   Occupational History   • None   Tobacco Use   • Smoking status: Never   • Smokeless tobacco: Never   Vaping Use   • Vaping Use: Never used   Substance and Sexual Activity   • Alcohol use: Yes     Alcohol/week: 3.0 standard drinks of alcohol     Types: 3 Cans of beer per week   • Drug use: Never   • Sexual activity: None   Other Topics Concern   • None   Social History Narrative   • None     Social Determinants of Health     Financial Resource Strain: Low Risk  (9/27/2023)    Overall Financial Resource Strain (CARDIA)    • Difficulty of Paying Living Expenses: Not hard at all   Food Insecurity: Not on file   Transportation Needs: No Transportation Needs (9/27/2023)    PRAPARE - Transportation    • Lack of Transportation (Medical): No    • Lack of Transportation (Non-Medical):  No   Physical Activity: Not on file   Stress: Not on file   Social Connections: Not on file   Intimate Partner Violence: Not on file   Housing Stability: Not on file     Current Outpatient Medications on File Prior to Visit   Medication Sig   • aspirin 81 mg chewable tablet Chew 1 tablet every other day   • bisoprolol-hydrochlorothiazide (ZIAC) 5-6.25 MG per tablet Take 1 tablet by mouth daily   • citalopram (CeleXA) 10 mg tablet take 1 tablet by mouth once daily   • citalopram (CeleXA) 20 mg tablet Take 1 tablet (20 mg total) by mouth daily   • fenofibrate (TRICOR) 145 mg tablet TAKE 1 TABLET DAILY   • glimepiride (AMARYL) 4 mg tablet Take 1 tablet (4 mg total) by mouth daily   • glucose blood (ACCU-CHEK GUIDE) test strip Use to test blood sugars twice daily as instructed   • ketorolac (ACULAR) 0.5 % ophthalmic solution    • Lancets MISC by Does not apply route 2 (two) times a day   • losartan (COZAAR) 50 mg tablet TAKE 1 TABLET DAILY   • metFORMIN (GLUCOPHAGE-XR) 500 mg 24 hr tablet Take 1 tablet (500 mg total) by mouth 2 (two) times a day   • prednisoLONE acetate (PRED FORTE) 1 % ophthalmic suspension    • benzonatate (TESSALON PERLES) 100 mg capsule Take 1 capsule (100 mg total) by mouth 3 (three) times a day as needed for cough (Patient not taking: Reported on 9/27/2023)   • sitaGLIPtin (JANUVIA) 100 mg tablet Take 1 tablet (100 mg total) by mouth daily (Patient not taking: Reported on 3/5/2023)   • [DISCONTINUED] carbidopa-levodopa (SINEMET)  mg per tablet Take 1 tablet by mouth 2 (two) times a day (Patient taking differently: Take 1 tablet by mouth daily )     No Known Allergies  Immunization History   Administered Date(s) Administered   • COVID-19 MODERNA VACC 0.5 ML IM 03/19/2021   • INFLUENZA 10/13/2022   • Influenza Quadrivalent Preservative Free 3 years and older IM 09/29/2014   • Influenza Quadrivalent, 6-35 Months IM 09/18/2015   • Influenza Split High Dose Preservative Free IM 10/19/2016, 09/15/2017   • Influenza, high dose seasonal 0.7 mL 10/07/2019, 11/13/2020, 10/21/2021, 10/13/2022, 09/27/2023   • Influenza, injectable, quadrivalent, preservative free 0.5 mL 09/29/2018   • Influenza, seasonal, injectable 09/20/2012, 09/24/2013   • Pneumococcal Conjugate 13-Valent 10/19/2016       Objective     /86   Pulse 61   Temp (!) 97.1 °F (36.2 °C)   Resp 16   Ht 5' 9.25" (1.759 m)   Wt 95.7 kg (211 lb)   SpO2 95%   BMI 30.93 kg/m²     Physical Exam  Vitals and nursing note reviewed.    Constitutional: General: He is not in acute distress. Appearance: Normal appearance. He is obese. He is not ill-appearing, toxic-appearing or diaphoretic. Comments: Pleasant, obese 78-year-old male who is awake alert and in no acute distress and oriented x3   HENT:      Head: Normocephalic and atraumatic. Right Ear: Tympanic membrane, ear canal and external ear normal. There is no impacted cerumen. Left Ear: Tympanic membrane, ear canal and external ear normal. There is no impacted cerumen. Nose: Nose normal. No congestion or rhinorrhea. Mouth/Throat:      Mouth: Mucous membranes are moist.      Pharynx: Oropharynx is clear. No oropharyngeal exudate or posterior oropharyngeal erythema. Eyes:      General: No scleral icterus. Right eye: No discharge. Left eye: No discharge. Extraocular Movements: Extraocular movements intact. Conjunctiva/sclera: Conjunctivae normal.      Pupils: Pupils are equal, round, and reactive to light. Neck:      Vascular: No carotid bruit. Cardiovascular:      Rate and Rhythm: Normal rate and regular rhythm. Pulses: Normal pulses. Heart sounds: Normal heart sounds. No murmur heard. No friction rub. No gallop. Pulmonary:      Effort: Pulmonary effort is normal. No respiratory distress. Breath sounds: Normal breath sounds. No stridor. No wheezing, rhonchi or rales. Chest:      Chest wall: No tenderness. Abdominal:      General: Bowel sounds are normal. There is no distension. Palpations: Abdomen is soft. There is no mass. Tenderness: There is no abdominal tenderness. There is no right CVA tenderness, left CVA tenderness, guarding or rebound. Hernia: No hernia is present. Genitourinary:     Penis: Normal.       Testes: Normal.      Prostate: Normal.      Rectum: Normal. Guaiac result negative. Musculoskeletal:         General: No swelling, tenderness, deformity or signs of injury. Normal range of motion. Cervical back: Normal range of motion and neck supple. No rigidity or tenderness. Right lower leg: No edema. Left lower leg: No edema. Lymphadenopathy:      Cervical: No cervical adenopathy. Skin:     General: Skin is warm and dry. Capillary Refill: Capillary refill takes less than 2 seconds. Coloration: Skin is not jaundiced or pale. Findings: No bruising, erythema, lesion or rash. Neurological:      General: No focal deficit present. Mental Status: He is alert and oriented to person, place, and time. Mental status is at baseline. Cranial Nerves: No cranial nerve deficit. Sensory: No sensory deficit. Motor: No weakness. Coordination: Coordination normal.      Gait: Gait normal.      Deep Tendon Reflexes: Reflexes normal.   Psychiatric:         Mood and Affect: Mood normal.         Behavior: Behavior normal.         Thought Content:  Thought content normal.         Judgment: Judgment normal.      Comments: Cheerful affect and mood today       Kallie John, DO

## 2023-09-27 NOTE — PATIENT INSTRUCTIONS
Medicare Preventive Visit Patient Instructions  Thank you for completing your Welcome to Medicare Visit or Medicare Annual Wellness Visit today. Your next wellness visit will be due in one year (9/27/2024). The screening/preventive services that you may require over the next 5-10 years are detailed below. Some tests may not apply to you based off risk factors and/or age. Screening tests ordered at today's visit but not completed yet may show as past due. Also, please note that scanned in results may not display below. Preventive Screenings:  Service Recommendations Previous Testing/Comments   Colorectal Cancer Screening  · Colonoscopy    · Fecal Occult Blood Test (FOBT)/Fecal Immunochemical Test (FIT)  · Fecal DNA/Cologuard Test  · Flexible Sigmoidoscopy Age: 43-73 years old   Colonoscopy: every 10 years (May be performed more frequently if at higher risk)  OR  FOBT/FIT: every 1 year  OR  Cologuard: every 3 years  OR  Sigmoidoscopy: every 5 years  Screening may be recommended earlier than age 39 if at higher risk for colorectal cancer. Also, an individualized decision between you and your healthcare provider will decide whether screening between the ages of 77-80 would be appropriate.  Colonoscopy: 10/13/2021  FOBT/FIT: Not on file  Cologuard: Not on file  Sigmoidoscopy: Not on file    Screening Current     Prostate Cancer Screening Individualized decision between patient and health care provider in men between ages of 53-66   Medicare will cover every 12 months beginning on the day after your 50th birthday PSA: 0.6 ng/mL     Screening Current     Hepatitis C Screening Once for adults born between 1945 and 1965  More frequently in patients at high risk for Hepatitis C Hep C Antibody: Not on file        Diabetes Screening 1-2 times per year if you're at risk for diabetes or have pre-diabetes Fasting glucose: 174 mg/dL (9/20/2023)  A1C: 8.4 % (9/20/2023)  Screening Not Indicated  History Diabetes   Cholesterol Screening Once every 5 years if you don't have a lipid disorder. May order more often based on risk factors. Lipid panel: 04/27/2023  Screening Not Indicated  History Lipid Disorder      Other Preventive Screenings Covered by Medicare:  1. Abdominal Aortic Aneurysm (AAA) Screening: covered once if your at risk. You're considered to be at risk if you have a family history of AAA or a male between the age of 70-76 who smoking at least 100 cigarettes in your lifetime. 2. Lung Cancer Screening: covers low dose CT scan once per year if you meet all of the following conditions: (1) Age 48-67; (2) No signs or symptoms of lung cancer; (3) Current smoker or have quit smoking within the last 15 years; (4) You have a tobacco smoking history of at least 20 pack years (packs per day x number of years you smoked); (5) You get a written order from a healthcare provider. 3. Glaucoma Screening: covered annually if you're considered high risk: (1) You have diabetes OR (2) Family history of glaucoma OR (3)  aged 48 and older OR (3)  American aged 72 and older  3. Osteoporosis Screening: covered every 2 years if you meet one of the following conditions: (1) Have a vertebral abnormality; (2) On glucocorticoid therapy for more than 3 months; (3) Have primary hyperparathyroidism; (4) On osteoporosis medications and need to assess response to drug therapy. 5. HIV Screening: covered annually if you're between the age of 14-79. Also covered annually if you are younger than 13 and older than 72 with risk factors for HIV infection. For pregnant patients, it is covered up to 3 times per pregnancy.     Immunizations:  Immunization Recommendations   Influenza Vaccine Annual influenza vaccination during flu season is recommended for all persons aged >= 6 months who do not have contraindications   Pneumococcal Vaccine   * Pneumococcal conjugate vaccine = PCV13 (Prevnar 13), PCV15 (Vaxneuvance), PCV20 (Prevnar 20)  * Pneumococcal polysaccharide vaccine = PPSV23 (Pneumovax) Adults 2364 years old: 1-3 doses may be recommended based on certain risk factors  Adults 72 years old: 1-2 doses may be recommended based off what pneumonia vaccine you previously received   Hepatitis B Vaccine 3 dose series if at intermediate or high risk (ex: diabetes, end stage renal disease, liver disease)   Tetanus (Td) Vaccine - COST NOT COVERED BY MEDICARE PART B Following completion of primary series, a booster dose should be given every 10 years to maintain immunity against tetanus. Td may also be given as tetanus wound prophylaxis. Tdap Vaccine - COST NOT COVERED BY MEDICARE PART B Recommended at least once for all adults. For pregnant patients, recommended with each pregnancy. Shingles Vaccine (Shingrix) - COST NOT COVERED BY MEDICARE PART B  2 shot series recommended in those aged 48 and above     Health Maintenance Due:      Topic Date Due   • Hepatitis C Screening  Never done   • Colorectal Cancer Screening  10/12/2026     Immunizations Due:      Topic Date Due   • Pneumococcal Vaccine: 65+ Years (2 - PPSV23 if available, else PCV20) 12/14/2016   • COVID-19 Vaccine (2 - Moderna series) 05/14/2021   • Influenza Vaccine (1) 09/01/2023     Advance Directives   What are advance directives? Advance directives are legal documents that state your wishes and plans for medical care. These plans are made ahead of time in case you lose your ability to make decisions for yourself. Advance directives can apply to any medical decision, such as the treatments you want, and if you want to donate organs. What are the types of advance directives? There are many types of advance directives, and each state has rules about how to use them. You may choose a combination of any of the following:  · Living will: This is a written record of the treatment you want.  You can also choose which treatments you do not want, which to limit, and which to stop at a certain time. This includes surgery, medicine, IV fluid, and tube feedings. · Durable power of  for healthcare [facility]): This is a written record that states who you want to make healthcare choices for you when you are unable to make them for yourself. This person, called a proxy, is usually a family member or a friend. You may choose more than 1 proxy. · Do not resuscitate (DNR) order:  A DNR order is used in case your heart stops beating or you stop breathing. It is a request not to have certain forms of treatment, such as CPR. A DNR order may be included in other types of advance directives. · Medical directive: This covers the care that you want if you are in a coma, near death, or unable to make decisions for yourself. You can list the treatments you want for each condition. Treatment may include pain medicine, surgery, blood transfusions, dialysis, IV or tube feedings, and a ventilator (breathing machine). · Values history: This document has questions about your views, beliefs, and how you feel and think about life. This information can help others choose the care that you would choose. Why are advance directives important? An advance directive helps you control your care. Although spoken wishes may be used, it is better to have your wishes written down. Spoken wishes can be misunderstood, or not followed. Treatments may be given even if you do not want them. An advance directive may make it easier for your family to make difficult choices about your care. Weight Management   Why it is important to manage your weight:  Being overweight increases your risk of health conditions such as heart disease, high blood pressure, type 2 diabetes, and certain types of cancer. It can also increase your risk for osteoarthritis, sleep apnea, and other respiratory problems. Aim for a slow, steady weight loss. Even a small amount of weight loss can lower your risk of health problems.   How to lose weight safely:  A safe and healthy way to lose weight is to eat fewer calories and get regular exercise. You can lose up about 1 pound a week by decreasing the number of calories you eat by 500 calories each day. Healthy meal plan for weight management:  A healthy meal plan includes a variety of foods, contains fewer calories, and helps you stay healthy. A healthy meal plan includes the following:  · Eat whole-grain foods more often. A healthy meal plan should contain fiber. Fiber is the part of grains, fruits, and vegetables that is not broken down by your body. Whole-grain foods are healthy and provide extra fiber in your diet. Some examples of whole-grain foods are whole-wheat breads and pastas, oatmeal, brown rice, and bulgur. · Eat a variety of vegetables every day. Include dark, leafy greens such as spinach, kale, sherrie greens, and mustard greens. Eat yellow and orange vegetables such as carrots, sweet potatoes, and winter squash. · Eat a variety of fruits every day. Choose fresh or canned fruit (canned in its own juice or light syrup) instead of juice. Fruit juice has very little or no fiber. · Eat low-fat dairy foods. Drink fat-free (skim) milk or 1% milk. Eat fat-free yogurt and low-fat cottage cheese. Try low-fat cheeses such as mozzarella and other reduced-fat cheeses. · Choose meat and other protein foods that are low in fat. Choose beans or other legumes such as split peas or lentils. Choose fish, skinless poultry (chicken or turkey), or lean cuts of red meat (beef or pork). Before you cook meat or poultry, cut off any visible fat. · Use less fat and oil. Try baking foods instead of frying them. Add less fat, such as margarine, sour cream, regular salad dressing and mayonnaise to foods. Eat fewer high-fat foods. Some examples of high-fat foods include french fries, doughnuts, ice cream, and cakes. · Eat fewer sweets. Limit foods and drinks that are high in sugar.  This includes candy, cookies, regular soda, and sweetened drinks. Exercise:  Exercise at least 30 minutes per day on most days of the week. Some examples of exercise include walking, biking, dancing, and swimming. You can also fit in more physical activity by taking the stairs instead of the elevator or parking farther away from stores. Ask your healthcare provider about the best exercise plan for you. © Copyright [address] 2018 Information is for End User's use only and may not be sold, redistributed or otherwise used for commercial purposes.  All illustrations and images included in CareNotes® are the copyrighted property of [employer].D.A[employer]M., Inc. or  [employer]

## 2023-09-27 NOTE — ASSESSMENT & PLAN NOTE
History of hyperlipidemia. Because of intolerance to statin drugs he has remained on fenofibrate. He admits he is not always perfect with his diet but again we discussed the importance of reducing his intake of fats and cholesterol with his diet. Continue to monitor his cholesterol and make changes with treatment if needed. Again we have tried him on multiple statins unsuccessfully.

## 2023-09-27 NOTE — PROGRESS NOTES
Assessment and Plan:     Problem List Items Addressed This Visit    None       Preventive health issues were discussed with patient, and age appropriate screening tests were ordered as noted in patient's After Visit Summary. Personalized health advice and appropriate referrals for health education or preventive services given if needed, as noted in patient's After Visit Summary.      History of Present Illness:     Patient presents for a Medicare Wellness Visit    HPI   Patient Care Team:  Jarad Vasquez DO as PCP - Antoine Newberry MD     Review of Systems:     Review of Systems     Problem List:     Patient Active Problem List   Diagnosis   • Anxiety   • Benign essential hypertension   • Type 2 diabetes mellitus with hyperglycemia, without long-term current use of insulin ([address])   • Hyperlipidemia   • Healthcare maintenance   • Acute pain of right knee   • Acute sinusitis   • Overweight   • Vitamin D deficiency   • Essential tremor   • Bronchitis   • Grief reaction   • Class 1 obesity due to excess calories with serious comorbidity in adult   • Swollen finger   • Blurred vision, bilateral   • Age-related cataract of both eyes   • Parkinson's disease ([address])      Past Medical and Surgical History:     Past Medical History:   Diagnosis Date   • Colon polyp    • DM (diabetes mellitus), type 2 ([address])    • Hypertension      Past Surgical History:   Procedure Laterality Date   • COLONOSCOPY     • KNEE SURGERY Right       Family History:     Family History   Problem Relation Age of Onset   • Diabetes unspecified Mother       Social History:     Social History     Socioeconomic History   • Marital status: /Civil Union     Spouse name: Not on file   • Number of children: Not on file   • Years of education: Not on file   • Highest education level: Not on file   Occupational History   • Not on file   Tobacco Use   • Smoking status: Never   • Smokeless tobacco: Never   Vaping Use   • Vaping Use: Never used Substance and Sexual Activity   • Alcohol use: Yes     Alcohol/week: 3.0 standard drinks of alcohol     Types: 3 Cans of beer per week   • Drug use: Never   • Sexual activity: Not on file   Other Topics Concern   • Not on file   Social History Narrative   • Not on file     Social Determinants of Health     Financial Resource Strain: Low Risk  (9/8/2022)    Overall Financial Resource Strain (CARDIA)    • Difficulty of Paying Living Expenses: Not very hard   Food Insecurity: Not on file   Transportation Needs: No Transportation Needs (9/8/2022)    PRAPARE - Transportation    • Lack of Transportation (Medical): No    • Lack of Transportation (Non-Medical):  No   Physical Activity: Not on file   Stress: Not on file   Social Connections: Not on file   Intimate Partner Violence: Not on file   Housing Stability: Not on file      Medications and Allergies:     Current Outpatient Medications   Medication Sig Dispense Refill   • citalopram (CeleXA) 10 mg tablet take 1 tablet by mouth once daily 90 tablet 1   • aspirin 81 mg chewable tablet Chew 1 tablet daily (Patient not taking: Reported on 7/31/2023)     • benzonatate (TESSALON PERLES) 100 mg capsule Take 1 capsule (100 mg total) by mouth 3 (three) times a day as needed for cough 45 capsule 2   • bisoprolol-hydrochlorothiazide (ZIAC) 5-6.25 MG per tablet Take 1 tablet by mouth daily 90 tablet 3   • citalopram (CeleXA) 20 mg tablet Take 1 tablet (20 mg total) by mouth daily 90 tablet 3   • fenofibrate (TRICOR) 145 mg tablet TAKE 1 TABLET DAILY 90 tablet 3   • glimepiride (AMARYL) 4 mg tablet Take 1 tablet (4 mg total) by mouth daily 90 tablet 3   • glucose blood (ACCU-CHEK GUIDE) test strip Use to test blood sugars twice daily as instructed 100 each 1   • ketorolac (ACULAR) 0.5 % ophthalmic solution      • Lancets MISC by Does not apply route 2 (two) times a day     • losartan (COZAAR) 50 mg tablet TAKE 1 TABLET DAILY 90 tablet 3   • metFORMIN (GLUCOPHAGE-XR) 500 mg 24 hr tablet Take 1 tablet (500 mg total) by mouth 2 (two) times a day 180 tablet 2   • prednisoLONE acetate (PRED FORTE) 1 % ophthalmic suspension      • sitaGLIPtin (JANUVIA) 100 mg tablet Take 1 tablet (100 mg total) by mouth daily (Patient not taking: Reported on 3/5/2023) 30 tablet 4     No current facility-administered medications for this visit. No Known Allergies   Immunizations:     Immunization History   Administered Date(s) Administered   • COVID-19 MODERNA VACC 0.5 ML IM 03/19/2021   • INFLUENZA 10/13/2022   • Influenza Quadrivalent Preservative Free 3 years and older IM 09/29/2014   • Influenza Quadrivalent, 6-35 Months IM 09/18/2015   • Influenza Split High Dose Preservative Free IM 10/19/2016, 09/15/2017   • Influenza, high dose seasonal 0.7 mL 10/07/2019, 11/13/2020, 10/21/2021, 10/13/2022   • Influenza, injectable, quadrivalent, preservative free 0.5 mL 09/29/2018   • Influenza, seasonal, injectable 09/20/2012, 09/24/2013   • Pneumococcal Conjugate 13-Valent 10/19/2016      Health Maintenance:         Topic Date Due   • Hepatitis C Screening  Never done   • Colorectal Cancer Screening  10/12/2026         Topic Date Due   • Pneumococcal Vaccine: 65+ Years (2 - PPSV23 if available, else PCV20) 12/14/2016   • COVID-19 Vaccine (2 - Moderna series) 05/14/2021   • Influenza Vaccine (1) 09/01/2023      Medicare Screening Tests and Risk Assessments:     Bharathi Alfredo is here for his Subsequent Wellness visit. Health Risk Assessment:   Patient rates overall health as excellent. Patient feels that their physical health rating is much better. Patient is very satisfied with their life. Eyesight was rated as same. Hearing was rated as same. Patient feels that their emotional and mental health rating is much better. Patients states they are never, rarely angry. Patient states they are never, rarely unusually tired/fatigued. Pain experienced in the last 7 days has been none.  Patient states that he has experienced no weight loss or gain in last 6 months. Fall Risk Screening: In the past year, patient has experienced: no history of falling in past year      Home Safety:  Patient does not have trouble with stairs inside or outside of their home. Patient has working smoke alarms and has no working carbon monoxide detector. Home safety hazards include: none. Nutrition:   Current diet is Regular. Medications:   Patient is not currently taking any over-the-counter supplements. Patient is able to manage medications. Activities of Daily Living (ADLs)/Instrumental Activities of Daily Living (IADLs):   Walk and transfer into and out of bed and chair?: Yes  Dress and groom yourself?: Yes    Bathe or shower yourself?: Yes    Feed yourself? Yes  Do your laundry/housekeeping?: Yes  Manage your money, pay your bills and track your expenses?: Yes  Make your own meals?: Yes    Do your own shopping?: Yes    Previous Hospitalizations:   Any hospitalizations or ED visits within the last 12 months?: Yes    How many hospitalizations have you had in the last year?: 1-2    Advance Care Planning:   Living will: Yes    Durable POA for healthcare:  Yes    Advanced directive: Yes      PREVENTIVE SCREENINGS      Cardiovascular Screening:    General: Screening Not Indicated and History Lipid Disorder      Diabetes Screening:     General: Screening Not Indicated and History Diabetes      Colorectal Cancer Screening:     General: Screening Current      Prostate Cancer Screening:    General: Screening Current      Abdominal Aortic Aneurysm (AAA) Screening:    Risk factors include: age between 70-77 yo        Lung Cancer Screening:     General: Screening Not Indicated    Screening, Brief Intervention, and Referral to Treatment (SBIRT)    Screening    Typical number of drinks in a week: 9    Single Item Drug Screening:  How often have you used an illegal drug (including marijuana) or a prescription medication for non-medical reasons in the past year? never    Single Item Drug Screen Score: 0  Interpretation: Negative screen for possible drug use disorder    No results found. Physical Exam:     There were no vitals taken for this visit.     Physical Exam     [place] Cassoday, DO

## 2023-09-27 NOTE — ASSESSMENT & PLAN NOTE
Patient is a 80-year-old male who is here today for repeat Medicare wellness visit. He did have labs performed prior to the visit today we did discuss the results. His sugar is still poorly controlled patient refuses to make any adjustments as far as diet. Physically and mentally states he is feeling well and did have a visit with a nurse from his insurance company who told him he was in great shape. He has no new complaints and did undergo physical exam today in the office with findings as noted. He will continue with present medication but we stressed the importance of working harder on his diet trying to eliminate not just concentrated sweets and simple carbohydrates but calories overall and weight loss. Check a fasting blood sugar hemoglobin A1c with his next visit return to the office in approximately 4 months.

## 2023-10-21 NOTE — ASSESSMENT & PLAN NOTE
Lab Results   Component Value Date    HGBA1C 6 9 (H) 05/26/2020    Patient did have labs performed prior to the visit today  Were happy to report to the patient that his sugar is showing adequate control with a hemoglobin A1c of 6 9  Patient admits that he is more conscientious about his diet  He is taking his medication as directed  Patient has not lost any weight since his last visit but we did discuss with him the importance of doing so  Patient will continue with present medication treatment  Diabetic foot exam was performed today and this was negative  -3

## 2023-12-04 ENCOUNTER — TELEPHONE (OUTPATIENT)
Dept: INTERNAL MEDICINE CLINIC | Facility: CLINIC | Age: 73
End: 2023-12-04

## 2023-12-04 DIAGNOSIS — J40 BRONCHITIS: Primary | ICD-10-CM

## 2023-12-04 RX ORDER — AZITHROMYCIN 250 MG/1
TABLET, FILM COATED ORAL
Qty: 6 TABLET | Refills: 0 | Status: SHIPPED | OUTPATIENT
Start: 2023-12-04 | End: 2023-12-09

## 2023-12-04 NOTE — PROGRESS NOTES
Call from Dr. Lisandro Spain patient today Dr. Emery Snow is out of the office. Patient indicates that he started with cough symptoms which are typically associated with a bronchitis infection according to the patient. He indicates that Dr. Emery Snow normally treats him with a Zithromax pack for 5 days when he develops the symptoms. Will be happy to send in a prescription to his pharmacy. If symptoms persist or worsen he knows to contact us for follow-up.

## 2023-12-04 NOTE — TELEPHONE ENCOUNTER
Pt called and stated he has bronchitis. H usually gets in twice a year and dr Vadim Mcintosh orders a z pack for him. He wants to know if you will order that for him in Dr green absence.

## 2024-01-24 ENCOUNTER — APPOINTMENT (OUTPATIENT)
Dept: LAB | Facility: CLINIC | Age: 74
End: 2024-01-24
Payer: COMMERCIAL

## 2024-01-24 DIAGNOSIS — E55.9 VITAMIN D DEFICIENCY: ICD-10-CM

## 2024-01-24 DIAGNOSIS — I10 BENIGN ESSENTIAL HYPERTENSION: ICD-10-CM

## 2024-01-24 DIAGNOSIS — E11.65 TYPE 2 DIABETES MELLITUS WITH HYPERGLYCEMIA, WITHOUT LONG-TERM CURRENT USE OF INSULIN (HCC): ICD-10-CM

## 2024-01-24 DIAGNOSIS — E78.2 MIXED HYPERLIPIDEMIA: ICD-10-CM

## 2024-01-24 LAB
25(OH)D3 SERPL-MCNC: 30.9 NG/ML (ref 30–100)
ANION GAP SERPL CALCULATED.3IONS-SCNC: 7 MMOL/L
BUN SERPL-MCNC: 14 MG/DL (ref 5–25)
CALCIUM SERPL-MCNC: 9.6 MG/DL (ref 8.4–10.2)
CHLORIDE SERPL-SCNC: 101 MMOL/L (ref 96–108)
CHOLEST SERPL-MCNC: 209 MG/DL
CO2 SERPL-SCNC: 28 MMOL/L (ref 21–32)
CREAT SERPL-MCNC: 1.06 MG/DL (ref 0.6–1.3)
EST. AVERAGE GLUCOSE BLD GHB EST-MCNC: 203 MG/DL
GFR SERPL CREATININE-BSD FRML MDRD: 69 ML/MIN/1.73SQ M
GLUCOSE P FAST SERPL-MCNC: 207 MG/DL (ref 65–99)
HBA1C MFR BLD: 8.7 %
HDLC SERPL-MCNC: 39 MG/DL
LDLC SERPL CALC-MCNC: 133 MG/DL (ref 0–100)
NONHDLC SERPL-MCNC: 170 MG/DL
POTASSIUM SERPL-SCNC: 4.3 MMOL/L (ref 3.5–5.3)
SODIUM SERPL-SCNC: 136 MMOL/L (ref 135–147)
TRIGL SERPL-MCNC: 186 MG/DL

## 2024-01-24 PROCEDURE — 83036 HEMOGLOBIN GLYCOSYLATED A1C: CPT

## 2024-01-24 PROCEDURE — 80048 BASIC METABOLIC PNL TOTAL CA: CPT

## 2024-01-24 PROCEDURE — 80061 LIPID PANEL: CPT

## 2024-01-24 PROCEDURE — 36415 COLL VENOUS BLD VENIPUNCTURE: CPT

## 2024-01-24 PROCEDURE — 82306 VITAMIN D 25 HYDROXY: CPT

## 2024-01-25 ENCOUNTER — VBI (OUTPATIENT)
Dept: ADMINISTRATIVE | Facility: OTHER | Age: 74
End: 2024-01-25

## 2024-01-26 ENCOUNTER — RA CDI HCC (OUTPATIENT)
Dept: OTHER | Facility: HOSPITAL | Age: 74
End: 2024-01-26

## 2024-01-29 DIAGNOSIS — E11.9 TYPE 2 DIABETES MELLITUS WITHOUT COMPLICATION, WITHOUT LONG-TERM CURRENT USE OF INSULIN (HCC): ICD-10-CM

## 2024-01-29 RX ORDER — METFORMIN HYDROCHLORIDE 500 MG/1
500 TABLET, EXTENDED RELEASE ORAL 2 TIMES DAILY
Qty: 180 TABLET | Refills: 3 | Status: SHIPPED | OUTPATIENT
Start: 2024-01-29

## 2024-02-20 ENCOUNTER — ESTABLISHED COMPREHENSIVE EXAM (OUTPATIENT)
Dept: URBAN - METROPOLITAN AREA CLINIC 6 | Facility: CLINIC | Age: 74
End: 2024-02-20

## 2024-02-20 DIAGNOSIS — Z96.1: ICD-10-CM

## 2024-02-20 DIAGNOSIS — E11.9: ICD-10-CM

## 2024-02-20 PROCEDURE — 92014 COMPRE OPH EXAM EST PT 1/>: CPT

## 2024-02-20 ASSESSMENT — VISUAL ACUITY
OS_SC: 20/20-2
OU_CC: J1+
OD_SC: 20/20

## 2024-02-20 ASSESSMENT — TONOMETRY
OS_IOP_MMHG: 9
OD_IOP_MMHG: 10

## 2024-02-21 PROBLEM — J01.90 ACUTE SINUSITIS: Status: RESOLVED | Noted: 2017-12-29 | Resolved: 2024-02-21

## 2024-02-21 PROBLEM — Z00.00 HEALTHCARE MAINTENANCE: Status: RESOLVED | Noted: 2018-05-22 | Resolved: 2024-02-21

## 2024-03-05 DIAGNOSIS — F41.9 ANXIETY: ICD-10-CM

## 2024-03-05 RX ORDER — CITALOPRAM HYDROBROMIDE 10 MG/1
10 TABLET ORAL DAILY
Qty: 90 TABLET | Refills: 1 | Status: SHIPPED | OUTPATIENT
Start: 2024-03-05

## 2024-04-10 DIAGNOSIS — I10 BENIGN ESSENTIAL HYPERTENSION: Primary | ICD-10-CM

## 2024-04-10 DIAGNOSIS — E55.9 VITAMIN D DEFICIENCY: ICD-10-CM

## 2024-04-10 DIAGNOSIS — E11.65 TYPE 2 DIABETES MELLITUS WITH HYPERGLYCEMIA, WITHOUT LONG-TERM CURRENT USE OF INSULIN (HCC): ICD-10-CM

## 2024-04-15 DIAGNOSIS — I10 ESSENTIAL HYPERTENSION: ICD-10-CM

## 2024-04-15 RX ORDER — BISOPROLOL FUMARATE AND HYDROCHLOROTHIAZIDE 5; 6.25 MG/1; MG/1
1 TABLET ORAL DAILY
Qty: 90 TABLET | Refills: 3 | Status: SHIPPED | OUTPATIENT
Start: 2024-04-15

## 2024-05-28 DIAGNOSIS — I10 ESSENTIAL HYPERTENSION: ICD-10-CM

## 2024-05-28 DIAGNOSIS — F41.9 ANXIETY: ICD-10-CM

## 2024-05-28 RX ORDER — CITALOPRAM 20 MG/1
20 TABLET ORAL DAILY
Qty: 90 TABLET | Refills: 1 | Status: SHIPPED | OUTPATIENT
Start: 2024-05-28

## 2024-05-28 RX ORDER — BISOPROLOL FUMARATE AND HYDROCHLOROTHIAZIDE 5; 6.25 MG/1; MG/1
1 TABLET ORAL DAILY
Qty: 90 TABLET | Refills: 1 | Status: SHIPPED | OUTPATIENT
Start: 2024-05-28

## 2024-05-28 NOTE — TELEPHONE ENCOUNTER
Patient stated he has no refills and [facility]    Reason for call:   [x] Refill   [] Prior Auth  [] Other:     Office:   [x] PCP/Provider -   Jim Wood DO     [] Specialty/Provider -     Medication:   bisoprolol-hydrochlorothiazide (ZIAC) 5-6.25 MG per tablet   citalopram (CeleXA) 20 mg tablet     Dose/Frequency:   1 tablet, Oral, Daily   20 mg, Oral, Daily     Quantity:   90  90    Pharmacy: [facility]     Does the patient have enough for 3 days?   [] Yes   [x] No - Send as HP to POD

## 2024-06-05 ENCOUNTER — OFFICE VISIT (OUTPATIENT)
Dept: INTERNAL MEDICINE CLINIC | Facility: CLINIC | Age: 74
End: 2024-06-05
Payer: COMMERCIAL

## 2024-06-05 VITALS
DIASTOLIC BLOOD PRESSURE: 78 MMHG | SYSTOLIC BLOOD PRESSURE: 142 MMHG | WEIGHT: 212 LBS | OXYGEN SATURATION: 94 % | HEART RATE: 80 BPM | BODY MASS INDEX: 30.35 KG/M2 | TEMPERATURE: 97 F | HEIGHT: 70 IN

## 2024-06-05 DIAGNOSIS — E78.2 MIXED HYPERLIPIDEMIA: ICD-10-CM

## 2024-06-05 DIAGNOSIS — Z00.00 MEDICARE ANNUAL WELLNESS VISIT, SUBSEQUENT: ICD-10-CM

## 2024-06-05 DIAGNOSIS — E11.9 TYPE 2 DIABETES MELLITUS WITHOUT COMPLICATION, WITHOUT LONG-TERM CURRENT USE OF INSULIN (HCC): ICD-10-CM

## 2024-06-05 DIAGNOSIS — G20.A2 PARKINSON'S DISEASE WITHOUT DYSKINESIA, WITH FLUCTUATING MANIFESTATIONS: ICD-10-CM

## 2024-06-05 DIAGNOSIS — H25.9 AGE-RELATED CATARACT OF BOTH EYES, UNSPECIFIED AGE-RELATED CATARACT TYPE: ICD-10-CM

## 2024-06-05 DIAGNOSIS — E11.65 TYPE 2 DIABETES MELLITUS WITH HYPERGLYCEMIA, WITHOUT LONG-TERM CURRENT USE OF INSULIN (HCC): ICD-10-CM

## 2024-06-05 DIAGNOSIS — E66.09 CLASS 1 OBESITY DUE TO EXCESS CALORIES WITH SERIOUS COMORBIDITY AND BODY MASS INDEX (BMI) OF 30.0 TO 30.9 IN ADULT: ICD-10-CM

## 2024-06-05 DIAGNOSIS — Z12.5 PROSTATE CANCER SCREENING: Primary | ICD-10-CM

## 2024-06-05 DIAGNOSIS — I10 BENIGN ESSENTIAL HYPERTENSION: ICD-10-CM

## 2024-06-05 PROCEDURE — G2211 COMPLEX E/M VISIT ADD ON: HCPCS | Performed by: INTERNAL MEDICINE

## 2024-06-05 PROCEDURE — 99214 OFFICE O/P EST MOD 30 MIN: CPT | Performed by: INTERNAL MEDICINE

## 2024-06-05 NOTE — ASSESSMENT & PLAN NOTE
Will be due for Medicare wellness visit when he returns to the office in October.  He was given a slip for complete labs to be performed prior to that visit.  In the interim if any new medical problems or concerns to please call.

## 2024-06-05 NOTE — ASSESSMENT & PLAN NOTE
Has a history of hyperlipidemia.  Because of his intolerance to statins he remains on fenofibrate.  Check another lipid profile with his next visit make further adjustment with treatment as indicated.  We will try to find if not controlled with another medication along with other besides the fenofibrate to control his cholesterol.  Reminded the patient again the importance of diet

## 2024-06-05 NOTE — ASSESSMENT & PLAN NOTE
Patient does have a history of hypertension.  Patient remains on medication as previously and he states he is compliant.  As noted his blood pressure is still not to goal especially in light of his diabetes.  In discussion patient is not watching his dietary intake especially of salt and states he was at breakfast this morning eating pancakes and bryant.  We did discuss the importance of watching more closely his intake of sodium taking the saltshaker away from the table.  We also discussed the fact that if he is eating out more often usually there will be more salt in the food that he is ordering.  Might consider increasing the hydrochlorothiazide with his medication with his next visit if blood pressure still not controlled

## 2024-06-05 NOTE — ASSESSMENT & PLAN NOTE
Patient does have a history of uncontrolled diabetes.  In reviewing his medication apparently he was not taking the Januvia along with his other medication as prescribed.  Last hemoglobin A1c that was performed in January was elevated at 8.7.  We did discuss with the patient the importance of watching his diet more closely and he admits that he has not been doing this.  Not only reducing his intake of concentrated sweets and simple carbohydrates but calories overall he needs to work harder.  He will restart taking the Januvia along with his other medications and we will check a fasting blood sugar hemoglobin A1c now and repeat this and 4 months with his next visit.  Continue also to monitor renal function and he is up-to-date with eye exams.  He was told if no improvement with his blood sugars especially since he is on 3 different medications to control his blood sugar would consider possible insulin dose  Lab Results   Component Value Date    HGBA1C 8.7 (H) 01/24/2024      pre-op instruction and orientation to procedure provided

## 2024-06-05 NOTE — ASSESSMENT & PLAN NOTE
Patient did have cataract surgery both eyes.  He states that he has a black dot that he sees in his left eye not always but occasionally.  We did reinforce the importance of getting down and having reevaluation by his ophthalmologist.  He states otherwise no visual difficulties.

## 2024-06-05 NOTE — ASSESSMENT & PLAN NOTE
With the patient's BMI he is considered obese.  He states he has not had any significant changes with his diet or exercise program since his last visit.  He was told he needs to look very closely at his caloric intake and ways that he can reduce the amount of calories he is ingesting on a daily basis in order to help with weight loss.  Patient understands but he states he is not very consistent with either diet or exercise.  He admits that since he is retired he has gotten a lot more lazy.

## 2024-06-05 NOTE — PROGRESS NOTES
Ambulatory Visit  Name: Paulo Gasca Jr.      : 1950      MRN: [id number]  Encounter Provider: Jim Wood DO  Encounter Date: 2024   Encounter department: [facility] INTERNAL MEDICINE    Assessment & Plan   1. Prostate cancer screening  -     PSA, Total Screen; Future  2. Benign essential hypertension  Assessment & Plan:  Patient does have a history of hypertension.  Patient remains on medication as previously and he states he is compliant.  As noted his blood pressure is still not to goal especially in light of his diabetes.  In discussion patient is not watching his dietary intake especially of salt and states he was at breakfast this morning eating pancakes and bryant.  We did discuss the importance of watching more closely his intake of sodium taking the saltshaker away from the table.  We also discussed the fact that if he is eating out more often usually there will be more salt in the food that he is ordering.  Might consider increasing the hydrochlorothiazide with his medication with his next visit if blood pressure still not controlled  3. Type 2 diabetes mellitus with hyperglycemia, without long-term current use of insulin ([facility])  Assessment & Plan:  Patient does have a history of uncontrolled diabetes.  In reviewing his medication apparently he was not taking the Januvia along with his other medication as prescribed.  Last hemoglobin A1c that was performed in January was elevated at 8.7.  We did discuss with the patient the importance of watching his diet more closely and he admits that he has not been doing this.  Not only reducing his intake of concentrated sweets and simple carbohydrates but calories overall he needs to work harder.  He will restart taking the Januvia along with his other medications and we will check a fasting blood sugar hemoglobin A1c now and repeat this and 4 months with his next visit.  Continue also to monitor renal function and he is up-to-date with  eye exams.  He was told if no improvement with his blood sugars especially since he is on 3 different medications to control his blood sugar would consider possible insulin dose  Lab Results   Component Value Date    HGBA1C 8.7 (H) 01/24/2024     Orders:  -     Hemoglobin A1C; Future  -     Albumin / creatinine urine ratio; Future  4. Type 2 diabetes mellitus without complication, without long-term current use of insulin (HCC)  -     sitaGLIPtin (JANUVIA) 100 mg tablet; Take 1 tablet (100 mg total) by mouth daily  5. Parkinson's disease without dyskinesia, with fluctuating manifestations  6. Medicare annual wellness visit, subsequent  Assessment & Plan:  Will be due for Medicare wellness visit when he returns to the office in October.  He was given a slip for complete labs to be performed prior to that visit.  In the interim if any new medical problems or concerns to please call.  Orders:  -     Comprehensive metabolic panel; Future; Expected date: 09/05/2024  -     CBC and differential; Future; Expected date: 09/05/2024  -     Lipid panel; Future; Expected date: 09/05/2024  -     Urinalysis with microscopic; Future  -     Hemoglobin A1C; Future  -     Albumin / creatinine urine ratio; Future  7. Age-related cataract of both eyes, unspecified age-related cataract type  Assessment & Plan:  Patient did have cataract surgery both eyes.  He states that he has a black dot that he sees in his left eye not always but occasionally.  We did reinforce the importance of getting down and having reevaluation by his ophthalmologist.  He states otherwise no visual difficulties.  8. Mixed hyperlipidemia  Assessment & Plan:  Has a history of hyperlipidemia.  Because of his intolerance to statins he remains on fenofibrate.  Check another lipid profile with his next visit make further adjustment with treatment as indicated.  We will try to find if not controlled with another medication along with other besides the fenofibrate to  control his cholesterol.  Reminded the patient again the importance of diet  9. Class 1 obesity due to excess calories with serious comorbidity and body mass index (BMI) of 30.0 to 30.9 in adult  Assessment & Plan:  With the patient's BMI he is considered obese.  He states he has not had any significant changes with his diet or exercise program since his last visit.  He was told he needs to look very closely at his caloric intake and ways that he can reduce the amount of calories he is ingesting on a daily basis in order to help with weight loss.  Patient understands but he states he is not very consistent with either diet or exercise.  He admits that since he is retired he has gotten a lot more lazy.         History of Present Illness     Patient is a 73-year-old male with history of significant medical problems outlined previously who is here today for follow-up.  He was supposed to have labs performed prior to the visit but neglected to have these done.  Patient states in general he is doing relatively well.  His family wants him to move down to [place] but he states he prefers to stay in this area.  As noted he is not watching his diet nor getting any regular exercise.  Does enjoy spending time with his family      Review of Systems   Constitutional: Negative.    HENT: Negative.     Eyes:  Positive for visual disturbance. Negative for photophobia, pain, discharge, redness and itching.   Respiratory: Negative.     Cardiovascular: Negative.    Gastrointestinal: Negative.    Endocrine: Negative.    Genitourinary: Negative.    Musculoskeletal: Negative.    Skin: Negative.    Allergic/Immunologic: Negative.    Neurological: Negative.    Hematological: Negative.    Psychiatric/Behavioral: Negative.       Past Medical History:   Diagnosis Date    Colon polyp     DM (diabetes mellitus), type 2 (HCC)     Hypertension      Past Surgical History:   Procedure Laterality Date    COLONOSCOPY      KNEE SURGERY Right       Family History   Problem Relation Age of Onset    Diabetes unspecified Mother      Social History     Tobacco Use    Smoking status: Never    Smokeless tobacco: Never   Vaping Use    Vaping status: Never Used   Substance and Sexual Activity    Alcohol use: Yes     Alcohol/week: 3.0 standard drinks of alcohol     Types: 3 Cans of beer per week    Drug use: Never    Sexual activity: Not on file     Current Outpatient Medications on File Prior to Visit   Medication Sig    aspirin 81 mg chewable tablet Chew 1 tablet every other day    bisoprolol-hydrochlorothiazide (ZIAC) 5-6.25 MG per tablet Take 1 tablet by mouth daily    citalopram (CeleXA) 10 mg tablet Take 1 tablet (10 mg total) by mouth daily    citalopram (CeleXA) 20 mg tablet Take 1 tablet (20 mg total) by mouth daily    fenofibrate (TRICOR) 145 mg tablet TAKE 1 TABLET DAILY    glimepiride (AMARYL) 4 mg tablet Take 1 tablet (4 mg total) by mouth daily    glucose blood (ACCU-CHEK GUIDE) test strip Use to test blood sugars twice daily as instructed    ketorolac (ACULAR) 0.5 % ophthalmic solution     Lancets MISC by Does not apply route 2 (two) times a day    losartan (COZAAR) 50 mg tablet TAKE 1 TABLET DAILY    metFORMIN (GLUCOPHAGE-XR) 500 mg 24 hr tablet TAKE 1 TABLET TWICE A DAY    prednisoLONE acetate (PRED FORTE) 1 % ophthalmic suspension     [DISCONTINUED] carbidopa-levodopa (SINEMET)  mg per tablet Take 1 tablet by mouth 2 (two) times a day (Patient taking differently: Take 1 tablet by mouth daily )    [DISCONTINUED] sitaGLIPtin (JANUVIA) 100 mg tablet Take 1 tablet (100 mg total) by mouth daily (Patient not taking: Reported on 3/5/2023)     No Known Allergies  Immunization History   Administered Date(s) Administered    COVID-19 MODERNA VACC 0.5 ML IM 03/19/2021, 04/15/2021, 10/30/2021    INFLUENZA 10/13/2022    Influenza Quadrivalent Preservative Free 3 years and older IM 09/29/2014    Influenza Quadrivalent, 6-35 Months IM 09/18/2015     "Influenza Split High Dose Preservative Free IM 10/19/2016, 09/15/2017    Influenza, high dose seasonal 0.7 mL 10/07/2019, 11/13/2020, 10/21/2021, 10/13/2022, 09/27/2023    Influenza, injectable, quadrivalent, preservative free 0.5 mL 09/29/2018    Influenza, seasonal, injectable 09/20/2012, 09/24/2013    Pneumococcal Conjugate 13-Valent 10/19/2016     Objective     /78   Pulse 80   Temp (!) 97 °F (36.1 °C)   Ht 5' 10\" (1.778 m)   Wt 96.2 kg (212 lb)   SpO2 94%   BMI 30.42 kg/m²     Physical Exam  Vitals and nursing note reviewed.   Constitutional:       General: He is not in acute distress.     Appearance: Normal appearance. He is obese. He is not ill-appearing, toxic-appearing or diaphoretic.      Comments: Pleasant, cheerful 73-year-old male who is awake alert in no acute distress oriented x 3, obese   HENT:      Head: Normocephalic and atraumatic.      Right Ear: Tympanic membrane, ear canal and external ear normal. There is no impacted cerumen.      Left Ear: Tympanic membrane, ear canal and external ear normal. There is no impacted cerumen.      Nose: Nose normal. No congestion or rhinorrhea.      Mouth/Throat:      Mouth: Mucous membranes are moist.      Pharynx: Oropharynx is clear. No oropharyngeal exudate or posterior oropharyngeal erythema.   Eyes:      General: No scleral icterus.        Right eye: No discharge.         Left eye: No discharge.      Extraocular Movements: Extraocular movements intact.      Conjunctiva/sclera: Conjunctivae normal.      Pupils: Pupils are equal, round, and reactive to light.   Neck:      Vascular: No carotid bruit.   Cardiovascular:      Rate and Rhythm: Normal rate and regular rhythm.      Pulses: Normal pulses.      Heart sounds: Normal heart sounds. No murmur heard.     No friction rub. No gallop.   Pulmonary:      Effort: Pulmonary effort is normal. No respiratory distress.      Breath sounds: Normal breath sounds. No stridor. No wheezing, rhonchi or rales. "   Chest:      Chest wall: No tenderness.   Abdominal:      General: Bowel sounds are normal. There is no distension.      Palpations: Abdomen is soft. There is no mass.      Tenderness: There is no abdominal tenderness. There is no right CVA tenderness, left CVA tenderness, guarding or rebound.      Hernia: No hernia is present.   Musculoskeletal:         General: No swelling, tenderness, deformity or signs of injury. Normal range of motion.      Cervical back: Normal range of motion and neck supple. No rigidity or tenderness.      Right lower leg: No edema.      Left lower leg: No edema.   Lymphadenopathy:      Cervical: No cervical adenopathy.   Skin:     General: Skin is warm and dry.      Coloration: Skin is not jaundiced or pale.      Findings: No bruising, erythema, lesion or rash.   Neurological:      General: No focal deficit present.      Mental Status: He is alert and oriented to person, place, and time. Mental status is at baseline.      Cranial Nerves: No cranial nerve deficit.      Sensory: No sensory deficit.      Motor: No weakness.      Coordination: Coordination normal.      Gait: Gait normal.      Deep Tendon Reflexes: Reflexes normal.   Psychiatric:         Mood and Affect: Mood normal.         Behavior: Behavior normal.         Thought Content: Thought content normal.         Judgment: Judgment normal.       Administrative Statements

## 2024-06-11 ENCOUNTER — APPOINTMENT (OUTPATIENT)
Dept: LAB | Facility: CLINIC | Age: 74
End: 2024-06-11
Payer: COMMERCIAL

## 2024-06-11 DIAGNOSIS — Z00.00 MEDICARE ANNUAL WELLNESS VISIT, SUBSEQUENT: ICD-10-CM

## 2024-06-11 DIAGNOSIS — E11.65 TYPE 2 DIABETES MELLITUS WITH HYPERGLYCEMIA, WITHOUT LONG-TERM CURRENT USE OF INSULIN (HCC): ICD-10-CM

## 2024-06-11 DIAGNOSIS — Z12.5 PROSTATE CANCER SCREENING: ICD-10-CM

## 2024-06-11 DIAGNOSIS — I10 BENIGN ESSENTIAL HYPERTENSION: ICD-10-CM

## 2024-06-11 LAB
EST. AVERAGE GLUCOSE BLD GHB EST-MCNC: 220 MG/DL
HBA1C MFR BLD: 9.3 %
PSA SERPL-MCNC: 0.51 NG/ML (ref 0–4)

## 2024-06-11 PROCEDURE — G0103 PSA SCREENING: HCPCS

## 2024-06-11 PROCEDURE — 36415 COLL VENOUS BLD VENIPUNCTURE: CPT

## 2024-06-11 PROCEDURE — 83036 HEMOGLOBIN GLYCOSYLATED A1C: CPT

## 2024-07-02 ENCOUNTER — TELEPHONE (OUTPATIENT)
Age: 74
End: 2024-07-02

## 2024-07-02 DIAGNOSIS — J40 BRONCHITIS: Primary | ICD-10-CM

## 2024-07-02 RX ORDER — AZITHROMYCIN 250 MG/1
TABLET, FILM COATED ORAL
Qty: 6 TABLET | Refills: 0 | Status: SHIPPED | OUTPATIENT
Start: 2024-07-02 | End: 2024-07-06

## 2024-07-02 NOTE — PROGRESS NOTES
Call the office with bronchitis type symptoms indicates that his physician Dr. Wood usually order Zithromax a 5-day Zithromax course has been sent to his pharmacy follow-up in the office if symptoms do not improve.

## 2024-07-02 NOTE — TELEPHONE ENCOUNTER
These notify the patient that prescription for Zithromax pack has been sent to his [facility] pharmacy.  Please have him contact the office for a visit if his symptoms do not respond.

## 2024-07-02 NOTE — TELEPHONE ENCOUNTER
Pt requesting Dr Wood put a prescription azithromycin (Zithromax) 250 mg tablet - Zpak to [facility] for bronchitis. Pt said he usually gets twice a year that doctor will just call into pharmacy for him.    Please call Pt when sending to pharmacy.  Leave a message if he doesn't answer.    Last appt was 6/5/2024.    [facility] #[id number] - [place], [place] - [address]

## 2024-07-05 PROBLEM — Z00.00 MEDICARE ANNUAL WELLNESS VISIT, SUBSEQUENT: Status: RESOLVED | Noted: 2018-05-22 | Resolved: 2024-07-05

## 2024-08-22 DIAGNOSIS — E11.9 TYPE 2 DIABETES MELLITUS WITHOUT COMPLICATION, WITHOUT LONG-TERM CURRENT USE OF INSULIN (HCC): ICD-10-CM

## 2024-08-22 DIAGNOSIS — I10 ESSENTIAL HYPERTENSION: ICD-10-CM

## 2024-08-22 RX ORDER — LOSARTAN POTASSIUM 50 MG/1
50 TABLET ORAL DAILY
Qty: 90 TABLET | Refills: 1 | Status: SHIPPED | OUTPATIENT
Start: 2024-08-22

## 2024-08-22 RX ORDER — FENOFIBRATE 145 MG/1
145 TABLET, COATED ORAL DAILY
Qty: 90 TABLET | Refills: 0 | Status: SHIPPED | OUTPATIENT
Start: 2024-08-22

## 2024-08-22 NOTE — TELEPHONE ENCOUNTER
Patient needs updated blood work and has previously placed orders. Please contact patient to go for labs (LIPID & CBC,CMP). Courtesy refill provided.

## 2024-08-26 DIAGNOSIS — F41.9 ANXIETY: ICD-10-CM

## 2024-08-26 RX ORDER — CITALOPRAM HYDROBROMIDE 10 MG/1
10 TABLET ORAL DAILY
Qty: 90 TABLET | Refills: 1 | Status: SHIPPED | OUTPATIENT
Start: 2024-08-26

## 2024-10-03 ENCOUNTER — APPOINTMENT (OUTPATIENT)
Dept: LAB | Facility: CLINIC | Age: 74
End: 2024-10-03
Payer: COMMERCIAL

## 2024-10-03 DIAGNOSIS — Z00.00 MEDICARE ANNUAL WELLNESS VISIT, SUBSEQUENT: ICD-10-CM

## 2024-10-03 LAB
ALBUMIN SERPL BCG-MCNC: 4.3 G/DL (ref 3.5–5)
ALP SERPL-CCNC: 34 U/L (ref 34–104)
ALT SERPL W P-5'-P-CCNC: 15 U/L (ref 7–52)
ANION GAP SERPL CALCULATED.3IONS-SCNC: 11 MMOL/L (ref 4–13)
AST SERPL W P-5'-P-CCNC: 13 U/L (ref 13–39)
BACTERIA UR QL AUTO: ABNORMAL /HPF
BASOPHILS # BLD AUTO: 0.07 THOUSANDS/ΜL (ref 0–0.1)
BASOPHILS NFR BLD AUTO: 1 % (ref 0–1)
BILIRUB SERPL-MCNC: 0.5 MG/DL (ref 0.2–1)
BILIRUB UR QL STRIP: NEGATIVE
BUN SERPL-MCNC: 22 MG/DL (ref 5–25)
CALCIUM SERPL-MCNC: 9.4 MG/DL (ref 8.4–10.2)
CHLORIDE SERPL-SCNC: 100 MMOL/L (ref 96–108)
CHOLEST SERPL-MCNC: 177 MG/DL
CLARITY UR: CLEAR
CO2 SERPL-SCNC: 25 MMOL/L (ref 21–32)
COLOR UR: ABNORMAL
CREAT SERPL-MCNC: 1.04 MG/DL (ref 0.6–1.3)
CREAT UR-MCNC: 147.1 MG/DL
EOSINOPHIL # BLD AUTO: 0.21 THOUSAND/ΜL (ref 0–0.61)
EOSINOPHIL NFR BLD AUTO: 3 % (ref 0–6)
ERYTHROCYTE [DISTWIDTH] IN BLOOD BY AUTOMATED COUNT: 13.3 % (ref 11.6–15.1)
GFR SERPL CREATININE-BSD FRML MDRD: 70 ML/MIN/1.73SQ M
GLUCOSE P FAST SERPL-MCNC: 171 MG/DL (ref 65–99)
GLUCOSE UR STRIP-MCNC: ABNORMAL MG/DL
HCT VFR BLD AUTO: 50.6 % (ref 36.5–49.3)
HDLC SERPL-MCNC: 33 MG/DL
HGB BLD-MCNC: 16.9 G/DL (ref 12–17)
HGB UR QL STRIP.AUTO: NEGATIVE
IMM GRANULOCYTES # BLD AUTO: 0.06 THOUSAND/UL (ref 0–0.2)
IMM GRANULOCYTES NFR BLD AUTO: 1 % (ref 0–2)
KETONES UR STRIP-MCNC: NEGATIVE MG/DL
LDLC SERPL CALC-MCNC: 97 MG/DL (ref 0–100)
LEUKOCYTE ESTERASE UR QL STRIP: NEGATIVE
LYMPHOCYTES # BLD AUTO: 1.73 THOUSANDS/ΜL (ref 0.6–4.47)
LYMPHOCYTES NFR BLD AUTO: 22 % (ref 14–44)
MCH RBC QN AUTO: 28.4 PG (ref 26.8–34.3)
MCHC RBC AUTO-ENTMCNC: 33.4 G/DL (ref 31.4–37.4)
MCV RBC AUTO: 85 FL (ref 82–98)
MICROALBUMIN UR-MCNC: 13 MG/L
MICROALBUMIN/CREAT 24H UR: 9 MG/G CREATININE (ref 0–30)
MONOCYTES # BLD AUTO: 1.15 THOUSAND/ΜL (ref 0.17–1.22)
MONOCYTES NFR BLD AUTO: 15 % (ref 4–12)
NEUTROPHILS # BLD AUTO: 4.5 THOUSANDS/ΜL (ref 1.85–7.62)
NEUTS SEG NFR BLD AUTO: 58 % (ref 43–75)
NITRITE UR QL STRIP: NEGATIVE
NON-SQ EPI CELLS URNS QL MICRO: ABNORMAL /HPF
NONHDLC SERPL-MCNC: 144 MG/DL
NRBC BLD AUTO-RTO: 0 /100 WBCS
PH UR STRIP.AUTO: 5.5 [PH]
PLATELET # BLD AUTO: 178 THOUSANDS/UL (ref 149–390)
PMV BLD AUTO: 10.3 FL (ref 8.9–12.7)
POTASSIUM SERPL-SCNC: 4.3 MMOL/L (ref 3.5–5.3)
PROT SERPL-MCNC: 6.9 G/DL (ref 6.4–8.4)
PROT UR STRIP-MCNC: NEGATIVE MG/DL
RBC # BLD AUTO: 5.95 MILLION/UL (ref 3.88–5.62)
RBC #/AREA URNS AUTO: ABNORMAL /HPF
SODIUM SERPL-SCNC: 136 MMOL/L (ref 135–147)
SP GR UR STRIP.AUTO: 1.02 (ref 1–1.03)
TRIGL SERPL-MCNC: 236 MG/DL
UROBILINOGEN UR STRIP-ACNC: <2 MG/DL
WBC # BLD AUTO: 7.72 THOUSAND/UL (ref 4.31–10.16)
WBC #/AREA URNS AUTO: ABNORMAL /HPF

## 2024-10-03 PROCEDURE — 80061 LIPID PANEL: CPT

## 2024-10-03 PROCEDURE — 36415 COLL VENOUS BLD VENIPUNCTURE: CPT

## 2024-10-03 PROCEDURE — 80053 COMPREHEN METABOLIC PANEL: CPT

## 2024-10-03 PROCEDURE — 85025 COMPLETE CBC W/AUTO DIFF WBC: CPT

## 2024-10-08 ENCOUNTER — OFFICE VISIT (OUTPATIENT)
Age: 74
End: 2024-10-08
Payer: COMMERCIAL

## 2024-10-08 VITALS
TEMPERATURE: 98.3 F | DIASTOLIC BLOOD PRESSURE: 78 MMHG | WEIGHT: 213 LBS | OXYGEN SATURATION: 95 % | BODY MASS INDEX: 30.49 KG/M2 | HEIGHT: 70 IN | HEART RATE: 63 BPM | SYSTOLIC BLOOD PRESSURE: 132 MMHG

## 2024-10-08 DIAGNOSIS — E66.811 CLASS 1 OBESITY DUE TO EXCESS CALORIES WITH SERIOUS COMORBIDITY AND BODY MASS INDEX (BMI) OF 30.0 TO 30.9 IN ADULT: Primary | ICD-10-CM

## 2024-10-08 DIAGNOSIS — I10 ESSENTIAL HYPERTENSION: ICD-10-CM

## 2024-10-08 DIAGNOSIS — E66.09 CLASS 1 OBESITY DUE TO EXCESS CALORIES WITH SERIOUS COMORBIDITY AND BODY MASS INDEX (BMI) OF 30.0 TO 30.9 IN ADULT: Primary | ICD-10-CM

## 2024-10-08 DIAGNOSIS — Z23 ENCOUNTER FOR IMMUNIZATION: ICD-10-CM

## 2024-10-08 DIAGNOSIS — F41.9 ANXIETY: ICD-10-CM

## 2024-10-08 DIAGNOSIS — Z00.00 MEDICARE ANNUAL WELLNESS VISIT, SUBSEQUENT: ICD-10-CM

## 2024-10-08 DIAGNOSIS — E11.65 TYPE 2 DIABETES MELLITUS WITH HYPERGLYCEMIA, WITHOUT LONG-TERM CURRENT USE OF INSULIN (HCC): ICD-10-CM

## 2024-10-08 DIAGNOSIS — I10 BENIGN ESSENTIAL HYPERTENSION: ICD-10-CM

## 2024-10-08 DIAGNOSIS — E11.9 TYPE 2 DIABETES MELLITUS WITHOUT COMPLICATION, WITHOUT LONG-TERM CURRENT USE OF INSULIN (HCC): ICD-10-CM

## 2024-10-08 PROCEDURE — G0439 PPPS, SUBSEQ VISIT: HCPCS | Performed by: INTERNAL MEDICINE

## 2024-10-08 PROCEDURE — 99214 OFFICE O/P EST MOD 30 MIN: CPT | Performed by: INTERNAL MEDICINE

## 2024-10-08 PROCEDURE — 90662 IIV NO PRSV INCREASED AG IM: CPT | Performed by: INTERNAL MEDICINE

## 2024-10-08 PROCEDURE — G0008 ADMIN INFLUENZA VIRUS VAC: HCPCS | Performed by: INTERNAL MEDICINE

## 2024-10-08 RX ORDER — FENOFIBRATE 145 MG/1
145 TABLET, COATED ORAL DAILY
Qty: 90 TABLET | Refills: 3 | Status: SHIPPED | OUTPATIENT
Start: 2024-10-08

## 2024-10-08 RX ORDER — CITALOPRAM HYDROBROMIDE 10 MG/1
10 TABLET ORAL DAILY
Qty: 90 TABLET | Refills: 3 | Status: SHIPPED | OUTPATIENT
Start: 2024-10-08

## 2024-10-08 NOTE — ASSESSMENT & PLAN NOTE
History of hypertension.  Blood pressure is controlled with present treatment, renal function is stable.

## 2024-10-08 NOTE — ASSESSMENT & PLAN NOTE
Patient is a 74-year-old male with a history of multiple medical problems outlined previously who is here today for repeat Medicare wellness visit.  Patient did have extensive lab testing performed prior to the visit today and we did discuss the results at length.  Patient is also reviewed the results online and he knows that he is having problems with control of his blood sugars.  He states that he had difficulty with all of his family moving down to [place] and was depressed but now they are back and he states that with him returning he is eating probably too much and that is why his sugar is gone up and his diabetes is not well-controlled.  He remarks that he was discharged from his endocrinologist previously and we feel that it is extremely important that he be followed up a little bit more closely in order to show better control of his sugars.  I did discuss with the patient seeing weight management with having him overweight and uncontrolled diabetes and initially he was very hesitant but now agrees.  He was told that they will call him to make an appointment for evaluation in the near future.  He underwent physical exam today in the office including diabetic foot exam.  Reminded the patient the importance of routine eye screening.

## 2024-10-08 NOTE — ASSESSMENT & PLAN NOTE
With the patient's BMI is considered obese.  Again he has a lot of excuses as far as sugar is concerned states that he is not watching his dietary intake first because of depression second because his daughter is making sure he is well fed.  Placed a consult for patient be seen by weight management    Orders:    Ambulatory Referral to Weight Management; Future

## 2024-10-08 NOTE — PROGRESS NOTES
Ambulatory Visit  Name: Paulo Gasca Jr.      : 1950      MRN: [id number]  Encounter Provider: Jim Wood DO  Encounter Date: 10/8/2024   Encounter department: [facility]    Assessment & Plan  Class 1 obesity due to excess calories with serious comorbidity and body mass index (BMI) of 30.0 to 30.9 in adult  With the patient's BMI is considered obese.  Again he has a lot of excuses as far as sugar is concerned states that he is not watching his dietary intake first because of depression second because his daughter is making sure he is well fed.  Placed a consult for patient be seen by weight management    Orders:    Ambulatory Referral to Weight Management; Future    Benign essential hypertension  History of hypertension.  Blood pressure is controlled with present treatment, renal function is stable.         Type 2 diabetes mellitus with hyperglycemia, without long-term current use of insulin (HCC)  As noted his hemoglobin A1c is now up to 9.3.  He is compliant with the medication but states he is not compliant as far as diet is concerned and despite the fact that his sugars up his weight has not gone up along with this.  We did discuss the importance of watching her caloric intake, trying to reduce his dietary intake of not just concentrated sweets and simple carbohydrates and calories overall.  Placed a consult for the patient to be seen by weight management.  Lab Results   Component Value Date    HGBA1C 9.3 (H) 2024       Orders:    Hemoglobin A1C; Future    Type 2 diabetes mellitus without complication, without long-term current use of insulin (HCC)    Lab Results   Component Value Date    HGBA1C 9.3 (H) 2024       Orders:    sitaGLIPtin (JANUVIA) 100 mg tablet; Take 1 tablet (100 mg total) by mouth daily    fenofibrate (TRICOR) 145 mg tablet; Take 1 tablet (145 mg total) by mouth daily    Ambulatory Referral to Weight Management; Future    Essential  hypertension    Orders:    fenofibrate (TRICOR) 145 mg tablet; Take 1 tablet (145 mg total) by mouth daily    Basic metabolic panel; Future    Anxiety  Patient does have a longstanding history of anxiety disorder.  Remains on Celexa which she states has been helpful.  Recently had some emotional difficulties with all his family moving down to [place] and he relates that he is very happy that they are back in the area now living with him.  He has stopped his Celexa previously and did have made sure short break comes and we feel it is important that we continue present treatment and he is urged to call if any new emotional problems or concerns    Orders:    citalopram (CeleXA) 10 mg tablet; Take 1 tablet (10 mg total) by mouth daily    Encounter for immunization    Orders:    influenza vaccine, high-dose, PF 0.5 mL (Fluzone High Dose)    Medicare annual wellness visit, subsequent  Patient is a 74-year-old male with a history of multiple medical problems outlined previously who is here today for repeat Medicare wellness visit.  Patient did have extensive lab testing performed prior to the visit today and we did discuss the results at length.  Patient is also reviewed the results online and he knows that he is having problems with control of his blood sugars.  He states that he had difficulty with all of his family moving down to [place] and was depressed but now they are back and he states that with him returning he is eating probably too much and that is why his sugar is gone up and his diabetes is not well-controlled.  He remarks that he was discharged from his endocrinologist previously and we feel that it is extremely important that he be followed up a little bit more closely in order to show better control of his sugars.  I did discuss with the patient seeing weight management with having him overweight and uncontrolled diabetes and initially he was very hesitant but now agrees.  He was told that they will call  him to make an appointment for evaluation in the near future.  He underwent physical exam today in the office including diabetic foot exam.  Reminded the patient the importance of routine eye screening.              History of Present Illness     Patient is a 74-year-old male with a history of extensive medical problems outlined previously who is here today for repeat Medicare wellness visit.  He did have extensive lab testing performed prior to the visit today and we did discuss the results at length with the patient.  Patient states over the past few months he is going through some emotional trials including his family moving down to [place] but now they have moved back and he feels more emotionally secure.      Review of Systems   Constitutional: Negative.    HENT: Negative.     Eyes: Negative.    Respiratory: Negative.     Cardiovascular: Negative.    Gastrointestinal: Negative.    Endocrine: Negative.    Genitourinary: Negative.    Musculoskeletal: Negative.    Skin: Negative.    Allergic/Immunologic: Negative.    Neurological: Negative.    Hematological: Negative.    Psychiatric/Behavioral:  Positive for dysphoric mood. Negative for agitation, behavioral problems, confusion, decreased concentration, hallucinations, self-injury, sleep disturbance and suicidal ideas. The patient is nervous/anxious. The patient is not hyperactive.      Past Medical History:   Diagnosis Date    Colon polyp     DM (diabetes mellitus), type 2 (HCC)     Hypertension      Past Surgical History:   Procedure Laterality Date    COLONOSCOPY      KNEE SURGERY Right      Family History   Problem Relation Age of Onset    Diabetes unspecified Mother      Social History     Tobacco Use    Smoking status: Never    Smokeless tobacco: Never   Vaping Use    Vaping status: Never Used   Substance and Sexual Activity    Alcohol use: Yes     Alcohol/week: 3.0 standard drinks of alcohol     Types: 3 Cans of beer per week    Drug use: Never    Sexual  activity: Not on file     Current Outpatient Medications on File Prior to Visit   Medication Sig    aspirin 81 mg chewable tablet Chew 1 tablet every other day    bisoprolol-hydrochlorothiazide (ZIAC) 5-6.25 MG per tablet Take 1 tablet by mouth daily    citalopram (CeleXA) 20 mg tablet Take 1 tablet (20 mg total) by mouth daily    glimepiride (AMARYL) 4 mg tablet Take 1 tablet (4 mg total) by mouth daily    glucose blood (ACCU-CHEK GUIDE) test strip Use to test blood sugars twice daily as instructed    ketorolac (ACULAR) 0.5 % ophthalmic solution     Lancets MISC by Does not apply route 2 (two) times a day    losartan (COZAAR) 50 mg tablet TAKE 1 TABLET DAILY    metFORMIN (GLUCOPHAGE-XR) 500 mg 24 hr tablet TAKE 1 TABLET TWICE A DAY    prednisoLONE acetate (PRED FORTE) 1 % ophthalmic suspension     [DISCONTINUED] citalopram (CeleXA) 10 mg tablet take 1 tablet by mouth once daily    [DISCONTINUED] fenofibrate (TRICOR) 145 mg tablet TAKE 1 TABLET DAILY    [DISCONTINUED] sitaGLIPtin (JANUVIA) 100 mg tablet Take 1 tablet (100 mg total) by mouth daily    [DISCONTINUED] carbidopa-levodopa (SINEMET)  mg per tablet Take 1 tablet by mouth 2 (two) times a day (Patient taking differently: Take 1 tablet by mouth daily )     No Known Allergies  Immunization History   Administered Date(s) Administered    COVID-19 MODERNA VACC 0.5 ML IM 03/19/2021, 04/15/2021, 10/30/2021    INFLUENZA 10/13/2022    Influenza Quadrivalent Preservative Free 3 years and older IM 09/29/2014    Influenza Quadrivalent, 6-35 Months IM 09/18/2015    Influenza Split High Dose Preservative Free IM 10/19/2016, 09/15/2017, 10/08/2024    Influenza, high dose seasonal 0.7 mL 10/07/2019, 11/13/2020, 10/21/2021, 10/13/2022, 09/27/2023    Influenza, injectable, quadrivalent, preservative free 0.5 mL 09/29/2018    Influenza, seasonal, injectable 09/20/2012, 09/24/2013    Pneumococcal Conjugate 13-Valent 10/19/2016     Objective     /78   Pulse 63    "Temp 98.3 °F (36.8 °C)   Ht 5' 10\" (1.778 m)   Wt 96.6 kg (213 lb)   SpO2 95%   BMI 30.56 kg/m²     Physical Exam  Vitals and nursing note reviewed.   Constitutional:       General: He is not in acute distress.     Appearance: Normal appearance. He is obese. He is not ill-appearing, toxic-appearing or diaphoretic.      Comments: Pleasant, cheerful, obese 74-year-old male who is awake alert no acute distress oriented x 3   HENT:      Head: Normocephalic and atraumatic.      Right Ear: Tympanic membrane, ear canal and external ear normal. There is no impacted cerumen.      Left Ear: Tympanic membrane, ear canal and external ear normal. There is no impacted cerumen.      Nose: Nose normal. No congestion or rhinorrhea.      Mouth/Throat:      Mouth: Mucous membranes are moist.      Pharynx: Oropharynx is clear. No oropharyngeal exudate or posterior oropharyngeal erythema.   Eyes:      General: No scleral icterus.        Right eye: No discharge.         Left eye: No discharge.      Extraocular Movements: Extraocular movements intact.      Conjunctiva/sclera: Conjunctivae normal.      Pupils: Pupils are equal, round, and reactive to light.   Neck:      Vascular: No carotid bruit.   Cardiovascular:      Rate and Rhythm: Normal rate and regular rhythm.      Pulses: Normal pulses.      Heart sounds: Normal heart sounds. No murmur heard.     No friction rub. No gallop.   Pulmonary:      Effort: Pulmonary effort is normal. No respiratory distress.      Breath sounds: Normal breath sounds. No stridor. No wheezing, rhonchi or rales.   Chest:      Chest wall: No tenderness.   Abdominal:      General: Abdomen is flat. Bowel sounds are normal. There is no distension.      Palpations: Abdomen is soft. There is no mass.      Tenderness: There is no abdominal tenderness. There is no right CVA tenderness, left CVA tenderness, guarding or rebound.      Hernia: No hernia is present.   Genitourinary:     Penis: Normal.       Testes: " Normal.      Prostate: Normal.      Rectum: Normal.   Musculoskeletal:         General: Deformity present. No swelling, tenderness or signs of injury. Normal range of motion.      Cervical back: Normal range of motion and neck supple. No rigidity or tenderness.      Right lower leg: No edema.      Left lower leg: No edema.      Comments: Mild diffuse arthritic changes but nothing acute.   Lymphadenopathy:      Cervical: No cervical adenopathy.   Skin:     General: Skin is warm and dry.      Coloration: Skin is not jaundiced or pale.      Findings: No bruising, erythema, lesion or rash.   Neurological:      General: No focal deficit present.      Mental Status: He is alert and oriented to person, place, and time. Mental status is at baseline.      Cranial Nerves: No cranial nerve deficit.      Sensory: No sensory deficit.      Motor: No weakness.      Coordination: Coordination normal.      Gait: Gait normal.      Deep Tendon Reflexes: Reflexes normal.   Psychiatric:         Mood and Affect: Mood normal.         Behavior: Behavior normal.         Thought Content: Thought content normal.         Judgment: Judgment normal.

## 2024-10-08 NOTE — ASSESSMENT & PLAN NOTE
Patient does have a longstanding history of anxiety disorder.  Remains on Celexa which she states has been helpful.  Recently had some emotional difficulties with all his family moving down to [place] and he relates that he is very happy that they are back in the area now living with him.  He has stopped his Celexa previously and did have made sure short break comes and we feel it is important that we continue present treatment and he is urged to call if any new emotional problems or concerns    Orders:    citalopram (CeleXA) 10 mg tablet; Take 1 tablet (10 mg total) by mouth daily

## 2024-10-08 NOTE — PATIENT INSTRUCTIONS
Medicare Preventive Visit Patient Instructions  Thank you for completing your Welcome to Medicare Visit or Medicare Annual Wellness Visit today. Your next wellness visit will be due in one year (10/9/2025).  The screening/preventive services that you may require over the next 5-10 years are detailed below. Some tests may not apply to you based off risk factors and/or age. Screening tests ordered at today's visit but not completed yet may show as past due. Also, please note that scanned in results may not display below.  Preventive Screenings:  Service Recommendations Previous Testing/Comments   Colorectal Cancer Screening  Colonoscopy    Fecal Occult Blood Test (FOBT)/Fecal Immunochemical Test (FIT)  Fecal DNA/Cologuard Test  Flexible Sigmoidoscopy Age: 45-75 years old   Colonoscopy: every 10 years (May be performed more frequently if at higher risk)  OR  FOBT/FIT: every 1 year  OR  Cologuard: every 3 years  OR  Sigmoidoscopy: every 5 years  Screening may be recommended earlier than age 45 if at higher risk for colorectal cancer. Also, an individualized decision between you and your healthcare provider will decide whether screening between the ages of 76-85 would be appropriate. Colonoscopy: 10/13/2021  FOBT/FIT: Not on file  Cologuard: Not on file  Sigmoidoscopy: Not on file    Screening Current     Prostate Cancer Screening Individualized decision between patient and health care provider in men between ages of 55-69   Medicare will cover every 12 months beginning on the day after your 50th birthday PSA: 0.51 ng/mL     Screening Current     Hepatitis C Screening Once for adults born between 1945 and 1965  More frequently in patients at high risk for Hepatitis C Hep C Antibody: Not on file        Diabetes Screening 1-2 times per year if you're at risk for diabetes or have pre-diabetes Fasting glucose: 171 mg/dL (10/3/2024)  A1C: 9.3 % (6/11/2024)  Screening Not Indicated  History Diabetes   Cholesterol Screening  Once every 5 years if you don't have a lipid disorder. May order more often based on risk factors. Lipid panel: 10/03/2024  Screening Not Indicated  History Lipid Disorder      Other Preventive Screenings Covered by Medicare:  Abdominal Aortic Aneurysm (AAA) Screening: covered once if your at risk. You're considered to be at risk if you have a family history of AAA or a male between the age of 65-75 who smoking at least 100 cigarettes in your lifetime.  Lung Cancer Screening: covers low dose CT scan once per year if you meet all of the following conditions: (1) Age 55-77; (2) No signs or symptoms of lung cancer; (3) Current smoker or have quit smoking within the last 15 years; (4) You have a tobacco smoking history of at least 20 pack years (packs per day x number of years you smoked); (5) You get a written order from a healthcare provider.  Glaucoma Screening: covered annually if you're considered high risk: (1) You have diabetes OR (2) Family history of glaucoma OR (3)  aged 50 and older OR (4)  American aged 65 and older  Osteoporosis Screening: covered every 2 years if you meet one of the following conditions: (1) Have a vertebral abnormality; (2) On glucocorticoid therapy for more than 3 months; (3) Have primary hyperparathyroidism; (4) On osteoporosis medications and need to assess response to drug therapy.  HIV Screening: covered annually if you're between the age of 15-65. Also covered annually if you are younger than 15 and older than 65 with risk factors for HIV infection. For pregnant patients, it is covered up to 3 times per pregnancy.    Immunizations:  Immunization Recommendations   Influenza Vaccine Annual influenza vaccination during flu season is recommended for all persons aged >= 6 months who do not have contraindications   Pneumococcal Vaccine   * Pneumococcal conjugate vaccine = PCV13 (Prevnar 13), PCV15 (Vaxneuvance), PCV20 (Prevnar 20)  * Pneumococcal polysaccharide  vaccine = PPSV23 (Pneumovax) Adults 19-63 yo with certain risk factors or if 65+ yo  If never received any pneumonia vaccine: recommend Prevnar 20 (PCV20)  Give PCV20 if previously received 1 dose of PCV13 or PPSV23   Hepatitis B Vaccine 3 dose series if at intermediate or high risk (ex: diabetes, end stage renal disease, liver disease)   Respiratory syncytial virus (RSV) Vaccine - COVERED BY MEDICARE PART D  * RSVPreF3 (Arexvy) CDC recommends that adults 60 years of age and older may receive a single dose of RSV vaccine using shared clinical decision-making (SCDM)   Tetanus (Td) Vaccine - COST NOT COVERED BY MEDICARE PART B Following completion of primary series, a booster dose should be given every 10 years to maintain immunity against tetanus. Td may also be given as tetanus wound prophylaxis.   Tdap Vaccine - COST NOT COVERED BY MEDICARE PART B Recommended at least once for all adults. For pregnant patients, recommended with each pregnancy.   Shingles Vaccine (Shingrix) - COST NOT COVERED BY MEDICARE PART B  2 shot series recommended in those 19 years and older who have or will have weakened immune systems or those 50 years and older     Health Maintenance Due:      Topic Date Due   • Hepatitis C Screening  Never done   • Colorectal Cancer Screening  10/12/2026     Immunizations Due:      Topic Date Due   • Pneumococcal Vaccine: 65+ Years (2 of 2 - PPSV23 or PCV20) 12/14/2016   • Influenza Vaccine (1) 09/01/2024   • COVID-19 Vaccine (5 - 2023-24 season) 09/01/2024     Advance Directives   What are advance directives?  Advance directives are legal documents that state your wishes and plans for medical care. These plans are made ahead of time in case you lose your ability to make decisions for yourself. Advance directives can apply to any medical decision, such as the treatments you want, and if you want to donate organs.   What are the types of advance directives?  There are many types of advance directives, and  each state has rules about how to use them. You may choose a combination of any of the following:  Living will:  This is a written record of the treatment you want. You can also choose which treatments you do not want, which to limit, and which to stop at a certain time. This includes surgery, medicine, IV fluid, and tube feedings.   Durable power of  for healthcare (DPAHC):  This is a written record that states who you want to make healthcare choices for you when you are unable to make them for yourself. This person, called a proxy, is usually a family member or a friend. You may choose more than 1 proxy.  Do not resuscitate (DNR) order:  A DNR order is used in case your heart stops beating or you stop breathing. It is a request not to have certain forms of treatment, such as CPR. A DNR order may be included in other types of advance directives.  Medical directive:  This covers the care that you want if you are in a coma, near death, or unable to make decisions for yourself. You can list the treatments you want for each condition. Treatment may include pain medicine, surgery, blood transfusions, dialysis, IV or tube feedings, and a ventilator (breathing machine).  Values history:  This document has questions about your views, beliefs, and how you feel and think about life. This information can help others choose the care that you would choose.  Why are advance directives important?  An advance directive helps you control your care. Although spoken wishes may be used, it is better to have your wishes written down. Spoken wishes can be misunderstood, or not followed. Treatments may be given even if you do not want them. An advance directive may make it easier for your family to make difficult choices about your care.   Weight Management   Why it is important to manage your weight:  Being overweight increases your risk of health conditions such as heart disease, high blood pressure, type 2 diabetes, and  certain types of cancer. It can also increase your risk for osteoarthritis, sleep apnea, and other respiratory problems. Aim for a slow, steady weight loss. Even a small amount of weight loss can lower your risk of health problems.  How to lose weight safely:  A safe and healthy way to lose weight is to eat fewer calories and get regular exercise. You can lose up about 1 pound a week by decreasing the number of calories you eat by 500 calories each day.   Healthy meal plan for weight management:  A healthy meal plan includes a variety of foods, contains fewer calories, and helps you stay healthy. A healthy meal plan includes the following:  Eat whole-grain foods more often.  A healthy meal plan should contain fiber. Fiber is the part of grains, fruits, and vegetables that is not broken down by your body. Whole-grain foods are healthy and provide extra fiber in your diet. Some examples of whole-grain foods are whole-wheat breads and pastas, oatmeal, brown rice, and bulgur.  Eat a variety of vegetables every day.  Include dark, leafy greens such as spinach, kale, sherrie greens, and mustard greens. Eat yellow and orange vegetables such as carrots, sweet potatoes, and winter squash.   Eat a variety of fruits every day.  Choose fresh or canned fruit (canned in its own juice or light syrup) instead of juice. Fruit juice has very little or no fiber.  Eat low-fat dairy foods.  Drink fat-free (skim) milk or 1% milk. Eat fat-free yogurt and low-fat cottage cheese. Try low-fat cheeses such as mozzarella and other reduced-fat cheeses.  Choose meat and other protein foods that are low in fat.  Choose beans or other legumes such as split peas or lentils. Choose fish, skinless poultry (chicken or turkey), or lean cuts of red meat (beef or pork). Before you cook meat or poultry, cut off any visible fat.   Use less fat and oil.  Try baking foods instead of frying them. Add less fat, such as margarine, sour cream, regular salad  dressing and mayonnaise to foods. Eat fewer high-fat foods. Some examples of high-fat foods include french fries, doughnuts, ice cream, and cakes.  Eat fewer sweets.  Limit foods and drinks that are high in sugar. This includes candy, cookies, regular soda, and sweetened drinks.  Exercise:  Exercise at least 30 minutes per day on most days of the week. Some examples of exercise include walking, biking, dancing, and swimming. You can also fit in more physical activity by taking the stairs instead of the elevator or parking farther away from stores. Ask your healthcare provider about the best exercise plan for you.      © Copyright [employer] 2018 Information is for End User's use only and may not be sold, redistributed or otherwise used for commercial purposes. All illustrations and images included in CareNotes® are the copyrighted property of A.D.A.M., Inc. or [employer]

## 2024-10-08 NOTE — ASSESSMENT & PLAN NOTE
As noted his hemoglobin A1c is now up to 9.3.  He is compliant with the medication but states he is not compliant as far as diet is concerned and despite the fact that his sugars up his weight has not gone up along with this.  We did discuss the importance of watching her caloric intake, trying to reduce his dietary intake of not just concentrated sweets and simple carbohydrates and calories overall.  Placed a consult for the patient to be seen by weight management.  Lab Results   Component Value Date    HGBA1C 9.3 (H) 06/11/2024       Orders:    Hemoglobin A1C; Future

## 2024-10-08 NOTE — PROGRESS NOTES
Diabetic Foot Exam    Patient's shoes and socks removed.    Right Foot/Ankle   Right Foot Inspection  Skin Exam: skin normal and skin intact. No dry skin, no warmth, no callus, no erythema, no maceration, no abnormal color, no pre-ulcer, no ulcer and no callus.     Toe Exam: ROM and strength within normal limits. No swelling, no tenderness, erythema and  no right toe deformity    Sensory   Vibration: intact  Proprioception: intact  Monofilament testing: intact    Vascular  Capillary refills: < 3 seconds  The right DP pulse is 2+. The right PT pulse is 2+.     Left Foot/Ankle  Left Foot Inspection  Skin Exam: skin normal and skin intact. No dry skin, no warmth, no erythema, no maceration, normal color, no pre-ulcer, no ulcer and no callus.     Toe Exam: ROM and strength within normal limits. No swelling, no tenderness, no erythema and no left toe deformity.     Sensory   Vibration: intact  Proprioception: intact  Monofilament testing: intact    Vascular  Capillary refills: < 3 seconds  The left DP pulse is 2+. The left PT pulse is 2+.     Assign Risk Category  No deformity present  No loss of protective sensation  No weak pulses  Risk: 0

## 2024-11-07 PROBLEM — Z00.00 MEDICARE ANNUAL WELLNESS VISIT, SUBSEQUENT: Status: RESOLVED | Noted: 2018-05-22 | Resolved: 2024-11-07

## 2024-11-09 ENCOUNTER — VBI (OUTPATIENT)
Dept: ADMINISTRATIVE | Facility: OTHER | Age: 74
End: 2024-11-09

## 2024-11-09 NOTE — TELEPHONE ENCOUNTER
11/09/24 6:15 PM     Chart reviewed for Hemoglobin A1c was/were not submitted to the patient's insurance.     Uma Baeza MA   [facility]

## 2024-11-19 ENCOUNTER — OFFICE VISIT (OUTPATIENT)
Age: 74
End: 2024-11-19
Payer: COMMERCIAL

## 2024-11-19 VITALS
HEART RATE: 65 BPM | DIASTOLIC BLOOD PRESSURE: 80 MMHG | RESPIRATION RATE: 18 BRPM | SYSTOLIC BLOOD PRESSURE: 140 MMHG | WEIGHT: 216 LBS | OXYGEN SATURATION: 97 % | TEMPERATURE: 98.5 F | BODY MASS INDEX: 30.92 KG/M2 | HEIGHT: 70 IN

## 2024-11-19 DIAGNOSIS — B95.8 STAPH INFECTION: Primary | ICD-10-CM

## 2024-11-19 PROCEDURE — G2211 COMPLEX E/M VISIT ADD ON: HCPCS | Performed by: INTERNAL MEDICINE

## 2024-11-19 PROCEDURE — 99213 OFFICE O/P EST LOW 20 MIN: CPT | Performed by: INTERNAL MEDICINE

## 2024-11-19 RX ORDER — MUPIROCIN 20 MG/G
OINTMENT TOPICAL 3 TIMES DAILY
Qty: 15 G | Refills: 1 | Status: SHIPPED | OUTPATIENT
Start: 2024-11-19

## 2024-11-19 NOTE — PROGRESS NOTES
Name: Paulo Gasca Jr.      : 1950      MRN: [id number]  Encounter Provider: Jim Wood DO  Encounter Date: 2024   Encounter department: [facility] INTERNAL MEDICINE    Assessment & Plan  Staph infection  Patient is here today for acute evaluation.  States that over the past few weeks has developed a rash to the lower lip on the right-hand side.  States that this area begins to ooze occasionally it gets crusted and is only slightly painful.  No accident or injury.  Patient on evaluation does have some swelling inflammation and a crusty lesion to the lower lip on the right-hand side.  It has the appearance of a staph infection impetigo.  Patient has had similar difficulties in the past and decision today is to treat with topical antibiotic cream, Bactroban ointment to place on that area 3 times a day until healed.  He will call and keep us updated as to the situation and whether or not the area is healing.    Orders:    mupirocin (BACTROBAN) 2 % ointment; Apply topically 3 (three) times a day         History of Present Illness     Patient is a 74-year-old male with a history of medical problems outlined previously who is being seen today acutely for evaluation.  States over the past few weeks he has developed a lesion to his lower lip on the right-hand side.  He states it slowly increasing in size slightly painful and swollen.  States that it does get crusty      Review of Systems   Constitutional: Negative.    HENT: Negative.     Eyes: Negative.    Respiratory: Negative.     Cardiovascular: Negative.    Gastrointestinal: Negative.    Endocrine: Negative.    Genitourinary: Negative.    Musculoskeletal: Negative.    Skin:  Positive for wound. Negative for color change, pallor and rash.   Allergic/Immunologic: Negative.    Neurological: Negative.    Hematological: Negative.    Psychiatric/Behavioral: Negative.          States he is improving emotionally with family members spending  time with him especially at nighttime.  They are making sure he is getting proper nutrition     Past Medical History:   Diagnosis Date    Colon polyp     DM (diabetes mellitus), type 2 (HCC)     Hypertension      Past Surgical History:   Procedure Laterality Date    COLONOSCOPY      KNEE SURGERY Right      Family History   Problem Relation Age of Onset    Diabetes unspecified Mother      Social History     Tobacco Use    Smoking status: Never    Smokeless tobacco: Never   Vaping Use    Vaping status: Never Used   Substance and Sexual Activity    Alcohol use: Yes     Alcohol/week: 3.0 standard drinks of alcohol     Types: 3 Cans of beer per week    Drug use: Never    Sexual activity: Not on file     Current Outpatient Medications on File Prior to Visit   Medication Sig    aspirin 81 mg chewable tablet Chew 1 tablet every other day    bisoprolol-hydrochlorothiazide (ZIAC) 5-6.25 MG per tablet Take 1 tablet by mouth daily    citalopram (CeleXA) 10 mg tablet Take 1 tablet (10 mg total) by mouth daily    citalopram (CeleXA) 20 mg tablet Take 1 tablet (20 mg total) by mouth daily    fenofibrate (TRICOR) 145 mg tablet Take 1 tablet (145 mg total) by mouth daily    glimepiride (AMARYL) 4 mg tablet Take 1 tablet (4 mg total) by mouth daily    glucose blood (ACCU-CHEK GUIDE) test strip Use to test blood sugars twice daily as instructed    ketorolac (ACULAR) 0.5 % ophthalmic solution     Lancets MISC by Does not apply route 2 (two) times a day    losartan (COZAAR) 50 mg tablet TAKE 1 TABLET DAILY    metFORMIN (GLUCOPHAGE-XR) 500 mg 24 hr tablet TAKE 1 TABLET TWICE A DAY    prednisoLONE acetate (PRED FORTE) 1 % ophthalmic suspension     sitaGLIPtin (JANUVIA) 100 mg tablet Take 1 tablet (100 mg total) by mouth daily    [DISCONTINUED] carbidopa-levodopa (SINEMET)  mg per tablet Take 1 tablet by mouth 2 (two) times a day (Patient taking differently: Take 1 tablet by mouth daily )     No Known Allergies  Immunization History  "  Administered Date(s) Administered    COVID-19 MODERNA VACC 0.5 ML IM 03/19/2021, 04/15/2021, 10/30/2021    INFLUENZA 10/13/2022    Influenza Quadrivalent Preservative Free 3 years and older IM 09/29/2014    Influenza Quadrivalent, 6-35 Months IM 09/18/2015    Influenza Split High Dose Preservative Free IM 10/19/2016, 09/15/2017, 10/08/2024    Influenza, high dose seasonal 0.7 mL 10/07/2019, 11/13/2020, 10/21/2021, 10/13/2022, 09/27/2023    Influenza, injectable, quadrivalent, preservative free 0.5 mL 09/29/2018    Influenza, seasonal, injectable 09/20/2012, 09/24/2013    Pneumococcal Conjugate 13-Valent 10/19/2016     Objective   /80   Pulse 65   Temp 98.5 °F (36.9 °C)   Resp 18   Ht 5' 10\" (1.778 m)   Wt 98 kg (216 lb)   SpO2 97%   BMI 30.99 kg/m²     Physical Exam  Vitals and nursing note reviewed.   Constitutional:       General: He is not in acute distress.     Appearance: Normal appearance. He is obese. He is not ill-appearing, toxic-appearing or diaphoretic.      Comments: Pleasant, obese 74-year-old male who is awake alert in no acute distress and oriented x 3, cheerful   HENT:      Head: Normocephalic and atraumatic.      Right Ear: Tympanic membrane, ear canal and external ear normal. There is no impacted cerumen.      Left Ear: Tympanic membrane, ear canal and external ear normal. There is no impacted cerumen.      Nose: Nose normal. No congestion or rhinorrhea.      Mouth/Throat:      Mouth: Mucous membranes are moist.      Pharynx: Oropharynx is clear. No oropharyngeal exudate or posterior oropharyngeal erythema.   Eyes:      Extraocular Movements: Extraocular movements intact.      Conjunctiva/sclera: Conjunctivae normal.      Pupils: Pupils are equal, round, and reactive to light.   Cardiovascular:      Rate and Rhythm: Normal rate and regular rhythm.   Pulmonary:      Effort: Pulmonary effort is normal.      Breath sounds: Normal breath sounds.   Abdominal:      General: Abdomen is " flat.      Palpations: Abdomen is soft.   Musculoskeletal:         General: Normal range of motion.   Skin:     General: Skin is warm and dry.      Coloration: Skin is not jaundiced or pale.      Findings: Lesion present. No bruising, erythema or rash.      Comments: Small crusted lesion to the lower lip on the right-hand side.  Consistent with impetigo.  States that he has slight pain to palpation but not severe.   Neurological:      General: No focal deficit present.      Mental Status: He is alert and oriented to person, place, and time. Mental status is at baseline.   Psychiatric:         Mood and Affect: Mood normal.         Behavior: Behavior normal.         Thought Content: Thought content normal.         Judgment: Judgment normal.

## 2024-11-19 NOTE — ASSESSMENT & PLAN NOTE
Patient is here today for acute evaluation.  States that over the past few weeks has developed a rash to the lower lip on the right-hand side.  States that this area begins to ooze occasionally it gets crusted and is only slightly painful.  No accident or injury.  Patient on evaluation does have some swelling inflammation and a crusty lesion to the lower lip on the right-hand side.  It has the appearance of a staph infection impetigo.  Patient has had similar difficulties in the past and decision today is to treat with topical antibiotic cream, Bactroban ointment to place on that area 3 times a day until healed.  He will call and keep us updated as to the situation and whether or not the area is healing.    Orders:    mupirocin (BACTROBAN) 2 % ointment; Apply topically 3 (three) times a day

## 2024-12-19 DIAGNOSIS — I10 ESSENTIAL HYPERTENSION: ICD-10-CM

## 2024-12-19 RX ORDER — BISOPROLOL FUMARATE AND HYDROCHLOROTHIAZIDE 5; 6.25 MG/1; MG/1
1 TABLET ORAL DAILY
Qty: 90 TABLET | Refills: 1 | Status: SHIPPED | OUTPATIENT
Start: 2024-12-19

## 2025-01-03 ENCOUNTER — RA CDI HCC (OUTPATIENT)
Dept: OTHER | Facility: HOSPITAL | Age: 75
End: 2025-01-03

## 2025-01-03 NOTE — PROGRESS NOTES
HCC coding opportunities       Chart reviewed, no opportunity found: CHART REVIEWED, NO OPPORTUNITY FOUND    Please review and document all HCC diagnoses using M.E.A.T. criteria as risk scores reset with the New Year.        Patients Insurance     Medicare Insurance: [employer]

## 2025-01-21 DIAGNOSIS — E11.9 TYPE 2 DIABETES MELLITUS WITHOUT COMPLICATION, WITHOUT LONG-TERM CURRENT USE OF INSULIN (HCC): ICD-10-CM

## 2025-01-21 RX ORDER — METFORMIN HYDROCHLORIDE 500 MG/1
500 TABLET, EXTENDED RELEASE ORAL 2 TIMES DAILY
Qty: 180 TABLET | Refills: 1 | Status: SHIPPED | OUTPATIENT
Start: 2025-01-21

## 2025-01-29 ENCOUNTER — VBI (OUTPATIENT)
Dept: ADMINISTRATIVE | Facility: OTHER | Age: 75
End: 2025-01-29

## 2025-01-29 NOTE — TELEPHONE ENCOUNTER
01/29/25 2:56 PM     Chart reviewed for Hemoglobin A1c ; nothing is submitted to the patient's insurance at this time.     Med Madera MA   [facility]

## 2025-02-18 DIAGNOSIS — I10 ESSENTIAL HYPERTENSION: ICD-10-CM

## 2025-02-19 RX ORDER — LOSARTAN POTASSIUM 50 MG/1
50 TABLET ORAL DAILY
Qty: 90 TABLET | Refills: 1 | Status: SHIPPED | OUTPATIENT
Start: 2025-02-19

## 2025-02-24 ENCOUNTER — VBI (OUTPATIENT)
Dept: ADMINISTRATIVE | Facility: OTHER | Age: 75
End: 2025-02-24

## 2025-02-24 NOTE — TELEPHONE ENCOUNTER
02/24/25 10:54 AM     Chart reviewed for Hemoglobin A1c was/were not submitted to the patient's insurance.     Angie Macdonald MA   [facility]

## 2025-02-26 ENCOUNTER — APPOINTMENT (OUTPATIENT)
Dept: LAB | Facility: CLINIC | Age: 75
End: 2025-02-26
Payer: COMMERCIAL

## 2025-02-26 DIAGNOSIS — E11.65 TYPE 2 DIABETES MELLITUS WITH HYPERGLYCEMIA, WITHOUT LONG-TERM CURRENT USE OF INSULIN (HCC): ICD-10-CM

## 2025-02-26 DIAGNOSIS — I10 ESSENTIAL HYPERTENSION: ICD-10-CM

## 2025-02-26 LAB
ANION GAP SERPL CALCULATED.3IONS-SCNC: 11 MMOL/L (ref 4–13)
BUN SERPL-MCNC: 22 MG/DL (ref 5–25)
CALCIUM SERPL-MCNC: 9.6 MG/DL (ref 8.4–10.2)
CHLORIDE SERPL-SCNC: 95 MMOL/L (ref 96–108)
CO2 SERPL-SCNC: 27 MMOL/L (ref 21–32)
CREAT SERPL-MCNC: 1.04 MG/DL (ref 0.6–1.3)
EST. AVERAGE GLUCOSE BLD GHB EST-MCNC: 278 MG/DL
GFR SERPL CREATININE-BSD FRML MDRD: 70 ML/MIN/1.73SQ M
GLUCOSE P FAST SERPL-MCNC: 338 MG/DL (ref 65–99)
HBA1C MFR BLD: 11.3 %
POTASSIUM SERPL-SCNC: 4 MMOL/L (ref 3.5–5.3)
SODIUM SERPL-SCNC: 133 MMOL/L (ref 135–147)

## 2025-02-26 PROCEDURE — 36415 COLL VENOUS BLD VENIPUNCTURE: CPT

## 2025-02-26 PROCEDURE — 80048 BASIC METABOLIC PNL TOTAL CA: CPT

## 2025-02-26 PROCEDURE — 83036 HEMOGLOBIN GLYCOSYLATED A1C: CPT

## 2025-02-27 ENCOUNTER — TELEPHONE (OUTPATIENT)
Age: 75
End: 2025-02-27

## 2025-02-27 NOTE — TELEPHONE ENCOUNTER
Called Pt, LM - per Dr Wood Pt needs an appointment NOW for DM.   Please forward to office if no availability today with Dr Wood-   Clinical- Jessica or Clerical - Graciela  Thank you

## 2025-02-28 ENCOUNTER — VBI (OUTPATIENT)
Dept: ADMINISTRATIVE | Facility: OTHER | Age: 75
End: 2025-02-28

## 2025-02-28 NOTE — TELEPHONE ENCOUNTER
02/28/25 10:28 AM     Chart reviewed for Hemoglobin A1c ; nothing is submitted to the patient's insurance at this time.     Med Madera MA   [facility]

## 2025-03-03 ENCOUNTER — OFFICE VISIT (OUTPATIENT)
Age: 75
End: 2025-03-03
Payer: COMMERCIAL

## 2025-03-03 VITALS
OXYGEN SATURATION: 96 % | SYSTOLIC BLOOD PRESSURE: 142 MMHG | HEIGHT: 70 IN | TEMPERATURE: 97.9 F | DIASTOLIC BLOOD PRESSURE: 75 MMHG | RESPIRATION RATE: 18 BRPM | HEART RATE: 67 BPM | WEIGHT: 215 LBS | BODY MASS INDEX: 30.78 KG/M2

## 2025-03-03 DIAGNOSIS — I10 BENIGN ESSENTIAL HYPERTENSION: ICD-10-CM

## 2025-03-03 DIAGNOSIS — F41.9 ANXIETY: ICD-10-CM

## 2025-03-03 DIAGNOSIS — E11.65 TYPE 2 DIABETES MELLITUS WITH HYPERGLYCEMIA, WITHOUT LONG-TERM CURRENT USE OF INSULIN (HCC): Primary | ICD-10-CM

## 2025-03-03 DIAGNOSIS — E78.2 MIXED HYPERLIPIDEMIA: ICD-10-CM

## 2025-03-03 PROCEDURE — G2211 COMPLEX E/M VISIT ADD ON: HCPCS | Performed by: INTERNAL MEDICINE

## 2025-03-03 PROCEDURE — 99214 OFFICE O/P EST MOD 30 MIN: CPT | Performed by: INTERNAL MEDICINE

## 2025-03-03 RX ORDER — METFORMIN HYDROCHLORIDE 750 MG/1
750 TABLET, EXTENDED RELEASE ORAL 2 TIMES DAILY
Qty: 200 TABLET | Refills: 3 | Status: SHIPPED | OUTPATIENT
Start: 2025-03-03

## 2025-03-03 RX ORDER — NATEGLINIDE 60 MG/1
60 TABLET ORAL
Qty: 270 TABLET | Refills: 1 | Status: SHIPPED | OUTPATIENT
Start: 2025-03-03

## 2025-03-03 NOTE — ASSESSMENT & PLAN NOTE
Patient has just returned from an extended visit across the country to visit family.  Patient admits he has not been watching his diet although he states he is taking his medication as directed.  As noted hemoglobin A1c is up to 11.3 showing extremely poor control.  Again patient states he has not been watching his diet over the past 4 to 5 months.  Has not been checking his blood sugars which we feel is extremely important.  We have made some modification as far as treatment is concerned and we have increased his Glucophage to 750 mg twice a day.  Really stopped his glimepiride and placed him instead on Starlix 3 times a day with food.  He was told he needs to watch his blood sugar readings on a daily basis and record them and present the results to us with his next visit in approximately 1 month.  Patient was instructed the importance of watching his blood sugar readings closely and reducing his caloric intake which we know needs to be done dramatically.  He admits that he has been drinking beer and it times in excess.  His daughter has reprimanded him as to not taking care of himself appropriately which is understandable.  Lab Results   Component Value Date    HGBA1C 11.3 (H) 02/26/2025       Orders:    metFORMIN (GLUCOPHAGE-XR) 750 mg 24 hr tablet; Take 1 tablet (750 mg total) by mouth 2 (two) times a day    nateglinide (STARLIX) 60 mg tablet; Take 1 tablet (60 mg total) by mouth 3 (three) times a day before meals    Ambulatory Referral to Endocrinology; Future

## 2025-03-03 NOTE — ASSESSMENT & PLAN NOTE
Patient does have a longstanding history of elevated blood pressure readings.  Patient states he has been taking his medication as previously prescribed combination bisoprolol hydrochlorothiazide and losartan 50 mg daily.  Blood pressure was initially elevated with presentation to the office but once the patient was allowed to sit and relax it did come down to acceptable range.  Will continue to monitor his blood pressure renal function and make appropriate changes medication if needed

## 2025-03-03 NOTE — ASSESSMENT & PLAN NOTE
Patient does have a longstanding history of anxiety disorder and remains on citalopram at 30 mg daily.  He states importantly that the trip away from home was emotionally important for him and he feels much better overall not just physically but emotionally and he continues with present medication

## 2025-03-03 NOTE — PROGRESS NOTES
Name: Paulo Gasca Jr.      : 1950      MRN: [id number]  Encounter Provider: Jim Wood DO  Encounter Date: 3/3/2025   Encounter department: [facility] INTERNAL MEDICINE    Assessment & Plan  Benign essential hypertension  Patient does have a longstanding history of elevated blood pressure readings.  Patient states he has been taking his medication as previously prescribed combination bisoprolol hydrochlorothiazide and losartan 50 mg daily.  Blood pressure was initially elevated with presentation to the office but once the patient was allowed to sit and relax it did come down to acceptable range.  Will continue to monitor his blood pressure renal function and make appropriate changes medication if needed         Type 2 diabetes mellitus with hyperglycemia, without long-term current use of insulin (HCC)  Patient has just returned from an extended visit across the country to visit family.  Patient admits he has not been watching his diet although he states he is taking his medication as directed.  As noted hemoglobin A1c is up to 11.3 showing extremely poor control.  Again patient states he has not been watching his diet over the past 4 to 5 months.  Has not been checking his blood sugars which we feel is extremely important.  We have made some modification as far as treatment is concerned and we have increased his Glucophage to 750 mg twice a day.  Really stopped his glimepiride and placed him instead on Starlix 3 times a day with food.  He was told he needs to watch his blood sugar readings on a daily basis and record them and present the results to us with his next visit in approximately 1 month.  Patient was instructed the importance of watching his blood sugar readings closely and reducing his caloric intake which we know needs to be done dramatically.  He admits that he has been drinking beer and it times in excess.  His daughter has reprimanded him as to not taking care of himself  appropriately which is understandable.  Lab Results   Component Value Date    HGBA1C 11.3 (H) 02/26/2025       Orders:  •  metFORMIN (GLUCOPHAGE-XR) 750 mg 24 hr tablet; Take 1 tablet (750 mg total) by mouth 2 (two) times a day  •  nateglinide (STARLIX) 60 mg tablet; Take 1 tablet (60 mg total) by mouth 3 (three) times a day before meals  •  Ambulatory Referral to Endocrinology; Future    Mixed hyperlipidemia  Patient has a history of hyperlipidemia.  He is unable to tolerate statins and remains on fenofibrate 145 mg daily.  Patient was given a slip to check on a lipid profile now and we will make adjustment of medication if needed.         Anxiety  Patient does have a longstanding history of anxiety disorder and remains on citalopram at 30 mg daily.  He states importantly that the trip away from home was emotionally important for him and he feels much better overall not just physically but emotionally and he continues with present medication              History of Present Illness     Patient is a 74-year-old male history of extensive medical problems outlined previously who is being seen today for follow-up.  Patient has been away across the country for approximately the last 5 months.  Admits that he is not watching his diet although he states he has been compliant with his medication.  He did have labs performed just recently and his hemoglobin A1c is extremely elevated so we brought him back into the office to discuss this and further treatment and evaluation.  Patient states physically and emotionally he is doing relatively well.  He states he needed this trip to get away from the area and reminders of his wife who passed away  Review of Systems   Constitutional: Negative.    HENT: Negative.     Eyes: Negative.    Respiratory: Negative.     Cardiovascular: Negative.    Gastrointestinal: Negative.    Endocrine: Negative.    Genitourinary: Negative.    Musculoskeletal: Negative.    Skin: Negative.     Allergic/Immunologic: Negative.    Neurological: Negative.    Hematological: Negative.    Psychiatric/Behavioral: Negative.     Past Medical History:   Diagnosis Date   • Colon polyp    • DM (diabetes mellitus), type 2 (HCC)    • Hypertension      Past Surgical History:   Procedure Laterality Date   • COLONOSCOPY     • KNEE SURGERY Right      Family History   Problem Relation Age of Onset   • Diabetes unspecified Mother      Social History     Tobacco Use   • Smoking status: Never   • Smokeless tobacco: Never   Vaping Use   • Vaping status: Never Used   Substance and Sexual Activity   • Alcohol use: Yes     Alcohol/week: 3.0 standard drinks of alcohol     Types: 3 Cans of beer per week   • Drug use: Never   • Sexual activity: Not on file     Current Outpatient Medications on File Prior to Visit   Medication Sig   • aspirin 81 mg chewable tablet Chew 1 tablet every other day   • bisoprolol-hydrochlorothiazide (ZIAC) 5-6.25 MG per tablet TAKE 1 TABLET DAILY   • citalopram (CeleXA) 10 mg tablet Take 1 tablet (10 mg total) by mouth daily   • citalopram (CeleXA) 20 mg tablet Take 1 tablet (20 mg total) by mouth daily   • fenofibrate (TRICOR) 145 mg tablet Take 1 tablet (145 mg total) by mouth daily   • glucose blood (ACCU-CHEK GUIDE) test strip Use to test blood sugars twice daily as instructed   • ketorolac (ACULAR) 0.5 % ophthalmic solution    • Lancets MISC by Does not apply route 2 (two) times a day   • losartan (COZAAR) 50 mg tablet TAKE 1 TABLET DAILY   • mupirocin (BACTROBAN) 2 % ointment Apply topically 3 (three) times a day   • prednisoLONE acetate (PRED FORTE) 1 % ophthalmic suspension    • sitaGLIPtin (JANUVIA) 100 mg tablet Take 1 tablet (100 mg total) by mouth daily   • [DISCONTINUED] glimepiride (AMARYL) 4 mg tablet Take 1 tablet (4 mg total) by mouth daily   • [DISCONTINUED] metFORMIN (GLUCOPHAGE-XR) 500 mg 24 hr tablet TAKE 1 TABLET TWICE A DAY   • [DISCONTINUED] carbidopa-levodopa (SINEMET)  mg  "per tablet Take 1 tablet by mouth 2 (two) times a day (Patient taking differently: Take 1 tablet by mouth daily )     No Known Allergies  Immunization History   Administered Date(s) Administered   • COVID-19 MODERNA VACC 0.5 ML IM 03/19/2021, 04/15/2021, 10/30/2021   • INFLUENZA 10/13/2022   • Influenza Quadrivalent Preservative Free 3 years and older IM 09/29/2014   • Influenza Quadrivalent, 6-35 Months IM 09/18/2015   • Influenza Split High Dose Preservative Free IM 10/19/2016, 09/15/2017, 10/08/2024   • Influenza, high dose seasonal 0.7 mL 10/07/2019, 11/13/2020, 10/21/2021, 10/13/2022, 09/27/2023   • Influenza, injectable, quadrivalent, preservative free 0.5 mL 09/29/2018   • Influenza, seasonal, injectable 09/20/2012, 09/24/2013   • Pneumococcal Conjugate 13-Valent 10/19/2016     Objective   /75   Pulse 67   Temp 97.9 °F (36.6 °C)   Resp 18   Ht 5' 10\" (1.778 m)   Wt 97.5 kg (215 lb)   SpO2 96%   BMI 30.85 kg/m²     Physical Exam  Vitals and nursing note reviewed.   Constitutional:       General: He is not in acute distress.     Appearance: Normal appearance. He is obese. He is not ill-appearing, toxic-appearing or diaphoretic.      Comments: 74-year-old male who is awake alert in no acute distress oriented x 3   HENT:      Head: Normocephalic and atraumatic.      Right Ear: Tympanic membrane, ear canal and external ear normal. There is no impacted cerumen.      Left Ear: Tympanic membrane, ear canal and external ear normal. There is no impacted cerumen.      Nose: Nose normal. No congestion or rhinorrhea.      Mouth/Throat:      Mouth: Mucous membranes are moist.      Pharynx: Oropharynx is clear. No oropharyngeal exudate or posterior oropharyngeal erythema.   Eyes:      General: No scleral icterus.        Right eye: No discharge.         Left eye: No discharge.      Extraocular Movements: Extraocular movements intact.      Conjunctiva/sclera: Conjunctivae normal.      Pupils: Pupils are equal, " round, and reactive to light.   Neck:      Vascular: No carotid bruit.   Cardiovascular:      Rate and Rhythm: Normal rate and regular rhythm.      Pulses: Normal pulses.      Heart sounds: Normal heart sounds. No murmur heard.     No friction rub. No gallop.   Pulmonary:      Effort: Pulmonary effort is normal. No respiratory distress.      Breath sounds: Normal breath sounds. No stridor. No wheezing, rhonchi or rales.   Chest:      Chest wall: No tenderness.   Abdominal:      General: Abdomen is flat. Bowel sounds are normal. There is no distension.      Palpations: Abdomen is soft. There is no mass.      Tenderness: There is no abdominal tenderness. There is no right CVA tenderness, left CVA tenderness, guarding or rebound.      Hernia: No hernia is present.   Musculoskeletal:         General: Deformity present. No swelling, tenderness or signs of injury. Normal range of motion.      Cervical back: Normal range of motion and neck supple. No rigidity or tenderness.      Right lower leg: No edema.      Left lower leg: No edema.      Comments: Mild diffuse arthritic changes but nothing acute.   Lymphadenopathy:      Cervical: No cervical adenopathy.   Skin:     General: Skin is warm and dry.      Coloration: Skin is not jaundiced or pale.      Findings: No bruising, erythema, lesion or rash.   Neurological:      General: No focal deficit present.      Mental Status: He is alert and oriented to person, place, and time. Mental status is at baseline.      Cranial Nerves: No cranial nerve deficit.      Sensory: No sensory deficit.      Motor: No weakness.      Coordination: Coordination normal.      Gait: Gait normal.      Deep Tendon Reflexes: Reflexes normal.   Psychiatric:         Mood and Affect: Mood normal.         Behavior: Behavior normal.         Thought Content: Thought content normal.         Judgment: Judgment normal.

## 2025-03-18 ENCOUNTER — VBI (OUTPATIENT)
Dept: ADMINISTRATIVE | Facility: OTHER | Age: 75
End: 2025-03-18

## 2025-03-18 NOTE — TELEPHONE ENCOUNTER
03/18/25 10:05 AM     Chart reviewed for Blood Pressure was/were not submitted to the patient's insurance.     Angie Macdonald MA   [facility]

## 2025-03-19 ENCOUNTER — VBI (OUTPATIENT)
Dept: ADMINISTRATIVE | Facility: OTHER | Age: 75
End: 2025-03-19

## 2025-03-19 NOTE — TELEPHONE ENCOUNTER
03/19/25 11:44 AM     Chart reviewed for Hemoglobin A1c was/were not submitted to the patient's insurance.     Uma Baeza MA   [facility]

## 2025-03-20 ENCOUNTER — OFFICE VISIT (OUTPATIENT)
Age: 75
End: 2025-03-20
Payer: COMMERCIAL

## 2025-03-20 VITALS
DIASTOLIC BLOOD PRESSURE: 80 MMHG | BODY MASS INDEX: 30.57 KG/M2 | SYSTOLIC BLOOD PRESSURE: 128 MMHG | HEART RATE: 65 BPM | HEIGHT: 69 IN | TEMPERATURE: 96.9 F | WEIGHT: 206.4 LBS

## 2025-03-20 DIAGNOSIS — E66.811 CLASS 1 OBESITY DUE TO EXCESS CALORIES WITH SERIOUS COMORBIDITY AND BODY MASS INDEX (BMI) OF 30.0 TO 30.9 IN ADULT: ICD-10-CM

## 2025-03-20 DIAGNOSIS — E11.9 TYPE 2 DIABETES MELLITUS WITHOUT COMPLICATION, WITHOUT LONG-TERM CURRENT USE OF INSULIN (HCC): ICD-10-CM

## 2025-03-20 DIAGNOSIS — E66.09 CLASS 1 OBESITY DUE TO EXCESS CALORIES WITH SERIOUS COMORBIDITY AND BODY MASS INDEX (BMI) OF 30.0 TO 30.9 IN ADULT: ICD-10-CM

## 2025-03-20 PROCEDURE — 99203 OFFICE O/P NEW LOW 30 MIN: CPT | Performed by: PHYSICIAN ASSISTANT

## 2025-03-20 NOTE — PROGRESS NOTES
Assessment/Plan:  Paulo was seen today for consult.    Diagnoses and all orders for this visit:    Class 1 obesity due to excess calories with serious comorbidity and body mass index (BMI) of 30.0 to 30.9 in adult  -     Ambulatory Referral to Weight Management    Type 2 diabetes mellitus without complication, without long-term current use of insulin (HCC)  -     Ambulatory Referral to Weight Management  -     Hemoglobin A1C; Future         - Discussed options of HealthyCORE-Intensive Lifestyle Intervention Program, Very Low Calorie Diet-VLCD, and Conservative Program and the role of weight loss medications.  - Explained the importance of making lifestyle changes first before starting anti-obesity medications.  - Patient should demonstrate lifestyle changes first before anti-obesity medication initiated.   - Patient is interested in pursuing Conservative Program  - Initial weight loss goal of 5-10% weight loss for improved health as studies have shown this is where we see the greatest impact on improving health and decreasing risk of obesity related conditions.  - Weight loss can improve patient's co-morbid conditions and/or prevent weight-related complications.  - Labs reviewed: As below.      General Recommendations:  Nutrition:  Eat breakfast daily.  Do not skip meals.     Food log (ie.) www.Meusonic.com, sparkpeople.com, loseit.com, calorieking.com, etc.    Practice mindful eating.  Be sure to set aside time to eat, eat slowly, and savor your food.    Hydration:    At least 64oz of water daily.  No sugar sweetened beverages.  No juice (eat the fruit instead).    Exercise:  Studies have shown that the ideal exercise goal is somewhere between 150 to 300 minutes of moderate intensity exercise a week.  Start with exercising 10 minutes every other day and gradually increase physical activity with a goal of at least 150 minutes of moderate intensity exercise a week, divided over at least 3 days a week.  An  example of this would be exercising 30 minutes a day, 5 days a week.  Resistance training can increase muscle mass and increase our resting metabolic rate.   FULL BODY resistance training is recommended 2-3 times a week.  Do not do this on consecutive days to allow for muscle recovery.    Aim for a bare minimum 5000 steps, even on days you do not exercise.    Monitoring:   Weigh yourself daily.  If this causes undue stress, then just weigh yourself once a week.  Weigh yourself the same time of the day with the same amount of clothing on.  Preferably this should be done after waking up, before you eat, and with no clothing or minimal clothing on.    Calorie goal:  1800 ivan/day (Provided with meal plan to follow).    Return visit:    Calorie tracking/deficit - reviewed higher protein options, lower carb options  Physical activity goals - keep up the good work!  AOM - would hold on pharmacotherapy at this time due to patients excellent dietary changes over the past month. Will plan to repeat hemoglobin A1c in 3-4 months. If no significant improvement, can consider GLP1 therapy      Total time spent reviewing chart, interviewing patient, examining patient, discussing plan, answering all questions, and documentin min.       ______________________________________________________________________        Subjective:   Chief Complaint   Patient presents with    Consult     Mwm consult        HPI: Katijemima YUN Gasca Jr.  is a 74 y.o. male with history of HTN, type 2 diabetes, parkinson's disease, HLD, and excess weight, here to pursue weight loss management.  Previous notes and records have been reviewed.    Type 2 diabetes - metformin 750mg BID, januvia, starlix. Hemoglobin A1c 11.3 (25)  HTN - bisoprolol-HCTZ, losartan    Daughter moved in with patient 1-2 months ago and helps facilitate cooking/menu planning/shopping.   Does admit to prior to daughter moving in that he ate random food without much intentional  thought.  Patient has lost 9 lbs in the past month with dietary and physical modifications.     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[employer]     Sleep:  8 hours/night       HPI  Wt Readings from Last 20 Encounters:   03/20/25 93.6 kg (206 lb 6.4 oz)   03/03/25 97.5 kg (215 lb)   11/19/24 98 kg (216 lb)   10/08/24 96.6 kg (213 lb)   06/05/24 96.2 kg (212 lb)   09/27/23 95.7 kg (211 lb)   07/31/23 94.8 kg (209 lb 1.6 oz)   05/09/23 95.2 kg (209 lb 12.8 oz)   03/05/23 95.8 kg (211 lb 3.2 oz)   01/05/23 93.4 kg (206 lb)   09/08/22 95.3 kg (210 lb)   06/21/22 97.1 kg (214 lb)   06/02/22 97.1 kg (214 lb)   04/29/22 95.7 kg (211 lb)   12/30/21 95.7 kg (211 lb)   11/16/21 95.8 kg (211 lb 3.2 oz)   10/13/21 93.9 kg (207 lb)   09/13/21 92.1 kg (203 lb)   08/13/21 93.4 kg (206 lb)   08/10/21 94 kg (207 lb 3.2 oz)         Past Medical History:   Diagnosis Date    Colon polyp     DM (diabetes mellitus), type 2 (HCC)     Hypertension           Past Surgical History:   Procedure Laterality Date    COLONOSCOPY      KNEE SURGERY Right      The following portions of the patient's history were reviewed and updated as appropriate: allergies, current medications, past family history, past medical history, past social history, past surgical history, and problem list.    Review Of Systems:  Review of Systems   Constitutional:  Negative for fatigue and fever.   HENT:  Negative for sore throat, trouble swallowing and voice change.    Respiratory:  Negative for shortness of breath.    Cardiovascular:  Negative for chest pain.   Gastrointestinal:  Negative for abdominal  "pain, constipation, diarrhea, nausea and vomiting.   Endocrine: Negative for cold intolerance and heat intolerance.   Genitourinary:  Negative for difficulty urinating.   Musculoskeletal:  Negative for arthralgias and back pain.   Psychiatric/Behavioral:  Negative for suicidal ideas. The patient is not nervous/anxious.    All other systems reviewed and are negative.      Objective:  /80 (Patient Position: Sitting, Cuff Size: Standard)   Pulse 65   Temp (!) 96.9 °F (36.1 °C) (Tympanic)   Ht 5' 9.29\" (1.76 m)   Wt 93.6 kg (206 lb 6.4 oz)   BMI 30.22 kg/m²   Physical Exam  Vitals and nursing note reviewed.   Constitutional:       General: He is not in acute distress.     Appearance: Normal appearance. He is obese. He is not ill-appearing, toxic-appearing or diaphoretic.   HENT:      Head: Normocephalic and atraumatic.   Eyes:      General:         Right eye: No discharge.         Left eye: No discharge.      Conjunctiva/sclera: Conjunctivae normal.   Pulmonary:      Effort: Pulmonary effort is normal. No respiratory distress.   Musculoskeletal:         General: Normal range of motion.      Cervical back: Normal range of motion. No rigidity.      Right lower leg: No edema.      Left lower leg: No edema.   Skin:     Coloration: Skin is not pale.      Findings: No erythema or rash.   Neurological:      General: No focal deficit present.      Mental Status: He is alert.   Psychiatric:         Mood and Affect: Mood normal.         Labs and Imaging  Recent labs and imaging have been personally reviewed.  Lab Results   Component Value Date    WBC 7.72 10/03/2024    HGB 16.9 10/03/2024    HCT 50.6 (H) 10/03/2024    MCV 85 10/03/2024     10/03/2024     Lab Results   Component Value Date     10/13/2016    SODIUM 133 (L) 02/26/2025    K 4.0 02/26/2025    CL 95 (L) 02/26/2025    CO2 27 02/26/2025    AGAP 11 02/26/2025    BUN 22 02/26/2025    CREATININE 1.04 02/26/2025    GLUC 306 (H) 03/05/2023    GLUF 338 " "(H) 02/26/2025    CALCIUM 9.6 02/26/2025    AST 13 10/03/2024    ALT 15 10/03/2024    ALKPHOS 34 10/03/2024    PROT 6.7 10/13/2016    TP 6.9 10/03/2024    BILITOT 0.6 10/13/2016    TBILI 0.50 10/03/2024    EGFR 70 02/26/2025     Lab Results   Component Value Date    HGBA1C 11.3 (H) 02/26/2025     No results found for: \"[id number]\", \"TSH\"  Lab Results   Component Value Date    CHOLESTEROL 177 10/03/2024     Lab Results   Component Value Date    HDL 33 (L) 10/03/2024     Lab Results   Component Value Date    TRIG 236 (H) 10/03/2024     Lab Results   Component Value Date    LDLCALC 97 10/03/2024      "

## 2025-03-24 ENCOUNTER — CONSULT (OUTPATIENT)
Dept: ENDOCRINOLOGY | Facility: CLINIC | Age: 75
End: 2025-03-24
Payer: COMMERCIAL

## 2025-03-24 VITALS
OXYGEN SATURATION: 98 % | BODY MASS INDEX: 30.35 KG/M2 | WEIGHT: 212 LBS | SYSTOLIC BLOOD PRESSURE: 132 MMHG | HEIGHT: 70 IN | DIASTOLIC BLOOD PRESSURE: 76 MMHG | HEART RATE: 62 BPM

## 2025-03-24 DIAGNOSIS — I10 PRIMARY HYPERTENSION: ICD-10-CM

## 2025-03-24 DIAGNOSIS — E78.2 MIXED HYPERLIPIDEMIA: ICD-10-CM

## 2025-03-24 DIAGNOSIS — E11.65 TYPE 2 DIABETES MELLITUS WITH HYPERGLYCEMIA, WITHOUT LONG-TERM CURRENT USE OF INSULIN (HCC): Primary | ICD-10-CM

## 2025-03-24 PROCEDURE — 99204 OFFICE O/P NEW MOD 45 MIN: CPT | Performed by: INTERNAL MEDICINE

## 2025-03-24 PROCEDURE — G2211 COMPLEX E/M VISIT ADD ON: HCPCS | Performed by: INTERNAL MEDICINE

## 2025-03-24 NOTE — ASSESSMENT & PLAN NOTE
Lab Results   Component Value Date    HGBA1C 11.3 (H) 02/26/2025   A1c is 11.3%, uncontrolled  Since he was started on Starlix 60 mg daily daily, blood sugars have improved and 100 to 160 mg per DL range as per patient.  Will continue metformin  mg twice a day, Starlix 60 mg 3 times daily and Januvia 100 mg daily  Discussed to check blood sugar before breakfast and before dinner and send log in 2 weeks to assess if we need to increase the dose of Starlix.  Educated to keep carbohydrate consistent with meals to avoid fluctuation in blood sugars  We discussed long-term complication of uncontrolled diabetes including nephropathy, retinopathy, neuropathy, risk of heart attack and stroke  Educated about hypoglycemia symptoms and treatment.  Discussed the importance of follow-up with ophthalmology and podiatry      Orders:    Hemoglobin A1C; Future    Basic metabolic panel; Future    Albumin / creatinine urine ratio; Future    C-peptide; Future     Please auth CTA on 5/16/2022    Thank you!

## 2025-03-24 NOTE — ASSESSMENT & PLAN NOTE
Lab Results   Component Value Date    LDLCALC 97 10/03/2024     Continue lifestyle modifications  Currently managed on fenofibrate 145 mg daily  Follow-up with primary care physician

## 2025-03-24 NOTE — PROGRESS NOTES
Name: Paulo Gasca Jr.      : 1950      MRN: [id number]  Encounter Provider: Neel Epstein MD  Encounter Date: 3/24/2025   Encounter department: [facility] [place]      :  Assessment & Plan  Type 2 diabetes mellitus with hyperglycemia, without long-term current use of insulin (HCC)    Lab Results   Component Value Date    HGBA1C 11.3 (H) 2025   A1c is 11.3%, uncontrolled  Since he was started on Starlix 60 mg daily daily, blood sugars have improved and 100 to 160 mg per DL range as per patient.  Will continue metformin  mg twice a day, Starlix 60 mg 3 times daily and Januvia 100 mg daily  Discussed to check blood sugar before breakfast and before dinner and send log in 2 weeks to assess if we need to increase the dose of Starlix.  Educated to keep carbohydrate consistent with meals to avoid fluctuation in blood sugars  We discussed long-term complication of uncontrolled diabetes including nephropathy, retinopathy, neuropathy, risk of heart attack and stroke  Educated about hypoglycemia symptoms and treatment.  Discussed the importance of follow-up with ophthalmology and podiatry      Orders:    Hemoglobin A1C; Future    Basic metabolic panel; Future    Albumin / creatinine urine ratio; Future    C-peptide; Future    Mixed hyperlipidemia  Lab Results   Component Value Date    LDLCALC 97 10/03/2024     Continue lifestyle modifications  Currently managed on fenofibrate 145 mg daily  Follow-up with primary care physician       Primary hypertension  Blood pressure usually well-controlled, continue current management as per PCP           History of Present Illness     Paulo Gasca Jr. is a 74 y.o. male with type 2 diabetes seen in follow up. Reports No  complications of diabetes. Denies recent severe hypoglycemic or severe hyperglycemic episodes. Denies any issues with his current regimen. Last A1C was   Lab Results   Component Value Date    HGBA1C  11.3 (H) 02/26/2025     . Denies recent illness, hospitalization or steroid use.   He checks blood sugars once daily, and blood sugars are in 100-120 range in AM       Current regimen: Metformin  mg twice a day, Januvia 100 mg daily, Starlix 60 mg 3 times daily with meals      Last Eye Exam: 07/06/2023  Last Foot Exam: 10/08/2024  Health Maintenance   Topic Date Due    Diabetic Eye Exam  07/06/2025    Diabetic Foot Exam  10/08/2025     Pertinent Medical History     Has hypertension: on ACE inhibitor/ARB, compliant most of the time  Has hyperlipidemia: on fenofibrate- tolerating well, no myalgias. compliant most of the time, denies any side effects from medications.  Thyroid disorders: No  History of pancreatitis: No  Lab Results   Component Value Date    CREATININE 1.04 02/26/2025              Review of Systems   Constitutional:  Negative for activity change, diaphoresis, fatigue, fever and unexpected weight change.   HENT: Negative.     Eyes:  Negative for visual disturbance.   Respiratory:  Negative for cough, chest tightness and shortness of breath.    Cardiovascular:  Negative for chest pain, palpitations and leg swelling.   Gastrointestinal:  Negative for abdominal pain, blood in stool, constipation, diarrhea, nausea and vomiting.   Endocrine: Negative for cold intolerance, heat intolerance, polydipsia, polyphagia and polyuria.   Genitourinary:  Negative for dysuria, enuresis, frequency and urgency.   Musculoskeletal:  Negative for arthralgias and myalgias.   Skin:  Negative for pallor, rash and wound.   Allergic/Immunologic: Negative.    Neurological:  Negative for dizziness, tremors, weakness and numbness.   Hematological: Negative.    Psychiatric/Behavioral: Negative.      as per HPI    Current Outpatient Medications on File Prior to Visit   Medication Sig Dispense Refill    aspirin 81 mg chewable tablet Chew 1 tablet every other day      bisoprolol-hydrochlorothiazide (ZIAC) 5-6.25 MG per tablet TAKE  "1 TABLET DAILY 90 tablet 1    citalopram (CeleXA) 10 mg tablet Take 1 tablet (10 mg total) by mouth daily 90 tablet 3    fenofibrate (TRICOR) 145 mg tablet Take 1 tablet (145 mg total) by mouth daily 90 tablet 3    glucose blood (ACCU-CHEK GUIDE) test strip Use to test blood sugars twice daily as instructed 100 each 1    ketorolac (ACULAR) 0.5 % ophthalmic solution       Lancets MISC by Does not apply route 2 (two) times a day      losartan (COZAAR) 50 mg tablet TAKE 1 TABLET DAILY 90 tablet 1    metFORMIN (GLUCOPHAGE-XR) 750 mg 24 hr tablet Take 1 tablet (750 mg total) by mouth 2 (two) times a day 200 tablet 3    mupirocin (BACTROBAN) 2 % ointment Apply topically 3 (three) times a day 15 g 1    nateglinide (STARLIX) 60 mg tablet Take 1 tablet (60 mg total) by mouth 3 (three) times a day before meals 270 tablet 1    prednisoLONE acetate (PRED FORTE) 1 % ophthalmic suspension       sitaGLIPtin (JANUVIA) 100 mg tablet Take 1 tablet (100 mg total) by mouth daily 90 tablet 3    citalopram (CeleXA) 20 mg tablet Take 1 tablet (20 mg total) by mouth daily (Patient not taking: Reported on 3/24/2025) 90 tablet 1    [DISCONTINUED] carbidopa-levodopa (SINEMET)  mg per tablet Take 1 tablet by mouth 2 (two) times a day (Patient taking differently: Take 1 tablet by mouth daily ) 60 tablet 3     No current facility-administered medications on file prior to visit.         Medical History Reviewed by provider this encounter:     .    Objective   /76 (BP Location: Left arm, Patient Position: Sitting, Cuff Size: Large)   Pulse 62   Ht 5' 10\" (1.778 m)   Wt 96.2 kg (212 lb)   SpO2 98%   BMI 30.42 kg/m²      Body mass index is 30.42 kg/m².  Wt Readings from Last 3 Encounters:   03/24/25 96.2 kg (212 lb)   03/20/25 93.6 kg (206 lb 6.4 oz)   03/03/25 97.5 kg (215 lb)     Component      Latest Ref Rng 10/3/2024   EXT Creatinine Urine      Reference range not established. mg/dL 147.1    Albumin,U,Random      <20.0 mg/L 13.0 " "   Albumin Creat Ratio      0 - 30 mg/g creatinine 9        Physical Exam  Vitals reviewed.   Constitutional:       Appearance: Normal appearance.   Cardiovascular:      Rate and Rhythm: Normal rate and regular rhythm.   Pulmonary:      Effort: Pulmonary effort is normal.      Breath sounds: Normal breath sounds.   Musculoskeletal:         General: Normal range of motion.      Cervical back: Normal range of motion and neck supple.      Right lower leg: No edema.      Left lower leg: No edema.   Skin:     General: Skin is warm.      Findings: No rash.   Neurological:      General: No focal deficit present.      Mental Status: He is alert and oriented to person, place, and time.   Psychiatric:         Mood and Affect: Mood normal.         Behavior: Behavior normal.         Labs:   Lab Results   Component Value Date    HGBA1C 11.3 (H) 02/26/2025    HGBA1C 9.3 (H) 06/11/2024    HGBA1C 8.7 (H) 01/24/2024     Lab Results   Component Value Date    CREATININE 1.04 02/26/2025    CREATININE 1.04 10/03/2024    CREATININE 1.06 01/24/2024    BUN 22 02/26/2025     10/13/2016    K 4.0 02/26/2025    CL 95 (L) 02/26/2025    CO2 27 02/26/2025     eGFR   Date Value Ref Range Status   02/26/2025 70 ml/min/1.73sq m Final     Lab Results   Component Value Date    CHOL 189 04/24/2017    HDL 33 (L) 10/03/2024    TRIG 236 (H) 10/03/2024     Lab Results   Component Value Date    ALT 15 10/03/2024    AST 13 10/03/2024    ALKPHOS 34 10/03/2024    BILITOT 0.6 10/13/2016     No results found for: \"[id number]\"    There are no Patient Instructions on file for this visit.    Discussed with the patient and all questioned fully answered. He will call me if any problems arise.      "

## 2025-04-03 ENCOUNTER — OFFICE VISIT (OUTPATIENT)
Age: 75
End: 2025-04-03
Payer: COMMERCIAL

## 2025-04-03 VITALS
RESPIRATION RATE: 16 BRPM | TEMPERATURE: 98.7 F | BODY MASS INDEX: 29.63 KG/M2 | SYSTOLIC BLOOD PRESSURE: 148 MMHG | OXYGEN SATURATION: 96 % | WEIGHT: 207 LBS | DIASTOLIC BLOOD PRESSURE: 82 MMHG | HEIGHT: 70 IN | HEART RATE: 62 BPM

## 2025-04-03 DIAGNOSIS — I10 BENIGN ESSENTIAL HYPERTENSION: ICD-10-CM

## 2025-04-03 DIAGNOSIS — E78.2 MIXED HYPERLIPIDEMIA: ICD-10-CM

## 2025-04-03 DIAGNOSIS — E11.65 TYPE 2 DIABETES MELLITUS WITH HYPERGLYCEMIA, WITHOUT LONG-TERM CURRENT USE OF INSULIN (HCC): Primary | ICD-10-CM

## 2025-04-03 PROCEDURE — G2211 COMPLEX E/M VISIT ADD ON: HCPCS | Performed by: INTERNAL MEDICINE

## 2025-04-03 PROCEDURE — 99214 OFFICE O/P EST MOD 30 MIN: CPT | Performed by: INTERNAL MEDICINE

## 2025-04-03 NOTE — ASSESSMENT & PLAN NOTE
Tree of hyperlipidemia.  Patient is on fenofibrate.  History is that the patient is unable to tolerate statins.  Check another lipid profile with his next visit.  Would consider starting bempedoic acid if he is unable to tolerate a statin.    Orders:    Lipid panel; Future

## 2025-04-03 NOTE — ASSESSMENT & PLAN NOTE
As noted with his last hemoglobin A1c his blood sugars were out of control.  Patient was seen by endocrinology and essentially he felt that he was told that his medication that he is on right now is appropriate for him and he continues the same.  His blood sugar at home are much lower now he is on the Starlix.  He states that since he is home he has been more compulsive about his diet and is staying busy by working out in the yard recently depending on the weather.  Will check a fasting blood sugar hemoglobin A1c with his next visit and also check on his kidney function and cholesterol.  He is up-to-date with eye exam and foot exam.  His only complaint was was seeing the endocrinologist she charged him $25  Lab Results   Component Value Date    HGBA1C 11.3 (H) 02/26/2025       Orders:    Hemoglobin A1C; Future    Lipid panel; Future

## 2025-04-03 NOTE — ASSESSMENT & PLAN NOTE
Blood pressure is stable.  Slightly elevated from his last visit.  Patient will continue present medication and we will arrange the patient to have renal function checked prior to his next visit if problems controlling his blood pressure will make modification of treatment treatment.  Heart rate is controlled and remains on a beta-blocker losartan and a low dose diuretic    Orders:    Basic metabolic panel; Future

## 2025-04-03 NOTE — PROGRESS NOTES
Name: Paulo Gasca Jr.      : 1950      MRN: [id number]  Encounter Provider: Jim Wood DO  Encounter Date: 4/3/2025   Encounter department: [facility] INTERNAL MEDICINE    Assessment & Plan  Benign essential hypertension  Blood pressure is stable.  Slightly elevated from his last visit.  Patient will continue present medication and we will arrange the patient to have renal function checked prior to his next visit if problems controlling his blood pressure will make modification of treatment treatment.  Heart rate is controlled and remains on a beta-blocker losartan and a low dose diuretic    Orders:  •  Basic metabolic panel; Future    Mixed hyperlipidemia  Tree of hyperlipidemia.  Patient is on fenofibrate.  History is that the patient is unable to tolerate statins.  Check another lipid profile with his next visit.  Would consider starting bempedoic acid if he is unable to tolerate a statin.    Orders:  •  Lipid panel; Future    Type 2 diabetes mellitus with hyperglycemia, without long-term current use of insulin (HCC)  As noted with his last hemoglobin A1c his blood sugars were out of control.  Patient was seen by endocrinology and essentially he felt that he was told that his medication that he is on right now is appropriate for him and he continues the same.  His blood sugar at home are much lower now he is on the Starlix.  He states that since he is home he has been more compulsive about his diet and is staying busy by working out in the yard recently depending on the weather.  Will check a fasting blood sugar hemoglobin A1c with his next visit and also check on his kidney function and cholesterol.  He is up-to-date with eye exam and foot exam.  His only complaint was was seeing the endocrinologist she charged him $25  Lab Results   Component Value Date    HGBA1C 11.3 (H) 2025       Orders:  •  Hemoglobin A1C; Future  •  Lipid panel; Future         History of Present Illness      Patient is a 74-year-old male history of extensive medical problems outlined previously who is here today for follow-up.  With his last visit he had just returned from a prolonged vacation across country.  States he was not always compliant as far as diet and medication during his vacation and labs show that his hemoglobin A1c has risen precipitously.  States that now he is back on diet and we did make some changes medication which she states has helped him get his sugar levels down.  He is also now more compliant with diet and exercise  Review of Systems   Constitutional:  Positive for activity change. Negative for appetite change, chills, diaphoresis, fatigue, fever and unexpected weight change.        Now with the warmer weather has been working outside in the yard.  Working on his vegetable garden   HENT: Negative.     Eyes: Negative.    Respiratory: Negative.     Cardiovascular: Negative.    Gastrointestinal: Negative.    Endocrine: Negative.    Genitourinary: Negative.    Musculoskeletal: Negative.    Skin: Negative.    Allergic/Immunologic: Negative.    Neurological: Negative.    Hematological: Negative.    Psychiatric/Behavioral: Negative.     Past Medical History:   Diagnosis Date   • Colon polyp    • DM (diabetes mellitus), type 2 (HCC)    • Hypertension      Past Surgical History:   Procedure Laterality Date   • COLONOSCOPY     • KNEE SURGERY Right      Family History   Problem Relation Age of Onset   • Diabetes unspecified Mother      Social History     Tobacco Use   • Smoking status: Never   • Smokeless tobacco: Never   Vaping Use   • Vaping status: Never Used   Substance and Sexual Activity   • Alcohol use: Yes     Alcohol/week: 3.0 standard drinks of alcohol     Types: 3 Cans of beer per week   • Drug use: Never   • Sexual activity: Not on file     Current Outpatient Medications on File Prior to Visit   Medication Sig   • aspirin 81 mg chewable tablet Chew 1 tablet every other day   •  bisoprolol-hydrochlorothiazide (ZIAC) 5-6.25 MG per tablet TAKE 1 TABLET DAILY   • citalopram (CeleXA) 10 mg tablet Take 1 tablet (10 mg total) by mouth daily   • fenofibrate (TRICOR) 145 mg tablet Take 1 tablet (145 mg total) by mouth daily   • glucose blood (ACCU-CHEK GUIDE) test strip Use to test blood sugars twice daily as instructed   • ketorolac (ACULAR) 0.5 % ophthalmic solution    • Lancets MISC by Does not apply route 2 (two) times a day   • losartan (COZAAR) 50 mg tablet TAKE 1 TABLET DAILY   • metFORMIN (GLUCOPHAGE-XR) 750 mg 24 hr tablet Take 1 tablet (750 mg total) by mouth 2 (two) times a day   • mupirocin (BACTROBAN) 2 % ointment Apply topically 3 (three) times a day   • nateglinide (STARLIX) 60 mg tablet Take 1 tablet (60 mg total) by mouth 3 (three) times a day before meals   • prednisoLONE acetate (PRED FORTE) 1 % ophthalmic suspension    • sitaGLIPtin (JANUVIA) 100 mg tablet Take 1 tablet (100 mg total) by mouth daily   • citalopram (CeleXA) 20 mg tablet Take 1 tablet (20 mg total) by mouth daily (Patient not taking: Reported on 3/24/2025)   • [DISCONTINUED] carbidopa-levodopa (SINEMET)  mg per tablet Take 1 tablet by mouth 2 (two) times a day (Patient taking differently: Take 1 tablet by mouth daily )     No Known Allergies  Immunization History   Administered Date(s) Administered   • COVID-19 MODERNA VACC 0.5 ML IM 03/19/2021, 04/15/2021, 10/30/2021   • INFLUENZA 10/13/2022   • Influenza Quadrivalent Preservative Free 3 years and older IM 09/29/2014   • Influenza Quadrivalent, 6-35 Months IM 09/18/2015   • Influenza Split High Dose Preservative Free IM 10/19/2016, 09/15/2017, 10/08/2024   • Influenza, high dose seasonal 0.7 mL 10/07/2019, 11/13/2020, 10/21/2021, 10/13/2022, 09/27/2023   • Influenza, injectable, quadrivalent, preservative free 0.5 mL 09/29/2018   • Influenza, seasonal, injectable 09/20/2012, 09/24/2013   • Pneumococcal Conjugate 13-Valent 10/19/2016     Objective   BP  "148/82   Pulse 62   Temp 98.7 °F (37.1 °C)   Resp 16   Ht 5' 10\" (1.778 m)   Wt 93.9 kg (207 lb)   SpO2 96%   BMI 29.70 kg/m²     Physical Exam  Vitals and nursing note reviewed.   Constitutional:       General: He is not in acute distress.     Appearance: Normal appearance. He is obese. He is not ill-appearing, toxic-appearing or diaphoretic.      Comments: Animated overweight 74-year-old male who is awake alert in no acute distress oriented x 3   HENT:      Head: Normocephalic and atraumatic.      Right Ear: Tympanic membrane, ear canal and external ear normal. There is no impacted cerumen.      Left Ear: Tympanic membrane, ear canal and external ear normal. There is no impacted cerumen.      Nose: Nose normal. No congestion or rhinorrhea.      Mouth/Throat:      Mouth: Mucous membranes are moist.      Pharynx: Oropharynx is clear. No oropharyngeal exudate or posterior oropharyngeal erythema.   Eyes:      General: No scleral icterus.        Right eye: No discharge.         Left eye: No discharge.      Extraocular Movements: Extraocular movements intact.      Conjunctiva/sclera: Conjunctivae normal.      Pupils: Pupils are equal, round, and reactive to light.   Neck:      Vascular: No carotid bruit.   Cardiovascular:      Rate and Rhythm: Normal rate and regular rhythm.      Pulses: Normal pulses.      Heart sounds: Normal heart sounds. No murmur heard.     No friction rub. No gallop.   Pulmonary:      Effort: Pulmonary effort is normal. No respiratory distress.      Breath sounds: Normal breath sounds. No stridor. No wheezing, rhonchi or rales.   Chest:      Chest wall: No tenderness.   Abdominal:      General: Abdomen is flat. Bowel sounds are normal. There is no distension.      Palpations: Abdomen is soft. There is no mass.      Tenderness: There is no abdominal tenderness. There is no right CVA tenderness, left CVA tenderness, guarding or rebound.      Hernia: No hernia is present.   Musculoskeletal:    "      General: Deformity present. No swelling, tenderness or signs of injury. Normal range of motion.      Cervical back: Normal range of motion and neck supple. No rigidity or tenderness.      Right lower leg: No edema.      Left lower leg: No edema.      Comments: Mild diffuse arthritic changes but nothing acute.   Lymphadenopathy:      Cervical: No cervical adenopathy.   Skin:     General: Skin is warm and dry.      Coloration: Skin is not jaundiced or pale.      Findings: No bruising, erythema, lesion or rash.   Neurological:      General: No focal deficit present.      Mental Status: He is alert and oriented to person, place, and time. Mental status is at baseline.      Cranial Nerves: No cranial nerve deficit.      Sensory: No sensory deficit.      Motor: No weakness.      Coordination: Coordination normal.      Gait: Gait normal.      Deep Tendon Reflexes: Reflexes normal.   Psychiatric:         Mood and Affect: Mood normal.         Behavior: Behavior normal.         Thought Content: Thought content normal.         Judgment: Judgment normal.

## 2025-04-07 ENCOUNTER — VBI (OUTPATIENT)
Dept: ADMINISTRATIVE | Facility: OTHER | Age: 75
End: 2025-04-07

## 2025-04-07 NOTE — TELEPHONE ENCOUNTER
04/07/25 1:41 PM     Chart reviewed for Hemoglobin A1c was/were not submitted to the patient's insurance.     Angie Macdonald MA   [facility]

## 2025-05-29 ENCOUNTER — DOCUMENTATION (OUTPATIENT)
Dept: ADMINISTRATIVE | Facility: OTHER | Age: 75
End: 2025-05-29

## 2025-05-29 NOTE — PROGRESS NOTES
05/29/25 3:47 PM     DM A1c outreach is not required, patient has an upcoming appointment with the PCP office.    Thank you.  Marjorie Glez  [facility]

## 2025-06-17 ENCOUNTER — APPOINTMENT (OUTPATIENT)
Dept: LAB | Facility: CLINIC | Age: 75
End: 2025-06-17
Attending: PHYSICIAN ASSISTANT
Payer: COMMERCIAL

## 2025-06-17 ENCOUNTER — RESULTS FOLLOW-UP (OUTPATIENT)
Age: 75
End: 2025-06-17

## 2025-06-17 DIAGNOSIS — I10 ESSENTIAL HYPERTENSION: ICD-10-CM

## 2025-06-17 DIAGNOSIS — E78.2 MIXED HYPERLIPIDEMIA: ICD-10-CM

## 2025-06-17 DIAGNOSIS — E11.65 TYPE 2 DIABETES MELLITUS WITH HYPERGLYCEMIA, WITHOUT LONG-TERM CURRENT USE OF INSULIN (HCC): ICD-10-CM

## 2025-06-17 DIAGNOSIS — E11.9 TYPE 2 DIABETES MELLITUS WITHOUT COMPLICATION, WITHOUT LONG-TERM CURRENT USE OF INSULIN (HCC): ICD-10-CM

## 2025-06-17 DIAGNOSIS — I10 BENIGN ESSENTIAL HYPERTENSION: ICD-10-CM

## 2025-06-17 LAB
ANION GAP SERPL CALCULATED.3IONS-SCNC: 4 MMOL/L (ref 4–13)
BUN SERPL-MCNC: 15 MG/DL (ref 5–25)
CALCIUM SERPL-MCNC: 9.2 MG/DL (ref 8.4–10.2)
CHLORIDE SERPL-SCNC: 99 MMOL/L (ref 96–108)
CHOLEST SERPL-MCNC: 169 MG/DL (ref ?–200)
CO2 SERPL-SCNC: 31 MMOL/L (ref 21–32)
CREAT SERPL-MCNC: 0.82 MG/DL (ref 0.6–1.3)
EST. AVERAGE GLUCOSE BLD GHB EST-MCNC: 355 MG/DL
GFR SERPL CREATININE-BSD FRML MDRD: 87 ML/MIN/1.73SQ M
GLUCOSE P FAST SERPL-MCNC: 283 MG/DL (ref 65–99)
HBA1C MFR BLD: 14 %
HDLC SERPL-MCNC: 36 MG/DL
LDLC SERPL CALC-MCNC: 93 MG/DL (ref 0–100)
NONHDLC SERPL-MCNC: 133 MG/DL
POTASSIUM SERPL-SCNC: 4.6 MMOL/L (ref 3.5–5.3)
SODIUM SERPL-SCNC: 134 MMOL/L (ref 135–147)
TRIGL SERPL-MCNC: 198 MG/DL (ref ?–150)

## 2025-06-17 PROCEDURE — 36415 COLL VENOUS BLD VENIPUNCTURE: CPT

## 2025-06-17 PROCEDURE — 83036 HEMOGLOBIN GLYCOSYLATED A1C: CPT

## 2025-06-17 PROCEDURE — 80048 BASIC METABOLIC PNL TOTAL CA: CPT

## 2025-06-17 PROCEDURE — 80061 LIPID PANEL: CPT

## 2025-06-17 RX ORDER — BISOPROLOL FUMARATE AND HYDROCHLOROTHIAZIDE 5; 6.25 MG/1; MG/1
1 TABLET ORAL DAILY
Qty: 90 TABLET | Refills: 0 | Status: SHIPPED | OUTPATIENT
Start: 2025-06-17

## 2025-06-18 ENCOUNTER — TELEPHONE (OUTPATIENT)
Age: 75
End: 2025-06-18

## 2025-06-18 NOTE — TELEPHONE ENCOUNTER
LVM to schedule a sooner follow up with weight management for medication discussion for rise in A1C, or if pt wanted to wait for endocrinology appt

## 2025-07-07 ENCOUNTER — OFFICE VISIT (OUTPATIENT)
Age: 75
End: 2025-07-07
Payer: COMMERCIAL

## 2025-07-07 VITALS
DIASTOLIC BLOOD PRESSURE: 70 MMHG | HEART RATE: 60 BPM | HEIGHT: 70 IN | SYSTOLIC BLOOD PRESSURE: 132 MMHG | WEIGHT: 198 LBS | BODY MASS INDEX: 28.35 KG/M2 | RESPIRATION RATE: 16 BRPM | TEMPERATURE: 97.9 F | OXYGEN SATURATION: 95 %

## 2025-07-07 DIAGNOSIS — E78.2 MIXED HYPERLIPIDEMIA: ICD-10-CM

## 2025-07-07 DIAGNOSIS — Z00.00 HEALTHCARE MAINTENANCE: Primary | ICD-10-CM

## 2025-07-07 DIAGNOSIS — E11.65 TYPE 2 DIABETES MELLITUS WITH HYPERGLYCEMIA, WITHOUT LONG-TERM CURRENT USE OF INSULIN (HCC): ICD-10-CM

## 2025-07-07 DIAGNOSIS — E66.811 CLASS 1 OBESITY DUE TO EXCESS CALORIES WITH SERIOUS COMORBIDITY AND BODY MASS INDEX (BMI) OF 30.0 TO 30.9 IN ADULT: ICD-10-CM

## 2025-07-07 DIAGNOSIS — I10 BENIGN ESSENTIAL HYPERTENSION: ICD-10-CM

## 2025-07-07 DIAGNOSIS — Z12.5 SCREENING FOR PROSTATE CANCER: ICD-10-CM

## 2025-07-07 DIAGNOSIS — E66.09 CLASS 1 OBESITY DUE TO EXCESS CALORIES WITH SERIOUS COMORBIDITY AND BODY MASS INDEX (BMI) OF 30.0 TO 30.9 IN ADULT: ICD-10-CM

## 2025-07-07 PROCEDURE — G2211 COMPLEX E/M VISIT ADD ON: HCPCS | Performed by: INTERNAL MEDICINE

## 2025-07-07 PROCEDURE — 99214 OFFICE O/P EST MOD 30 MIN: CPT | Performed by: INTERNAL MEDICINE

## 2025-07-07 NOTE — PROGRESS NOTES
Name: Paulo Gasca Jr.      : 1950      MRN: [id number]  Encounter Provider: Jim Wood DO  Encounter Date: 2025   Encounter department: [facility] INTERNAL MEDICINE    Assessment & Plan  Class 1 obesity due to excess calories with serious comorbidity and body mass index (BMI) of 30.0 to 30.9 in adult   patient has been losing some weight.  Is no longer considered obese and more just overweight.  Again as noted we feel his weight loss may be secondary to his poor control of his blood sugars and not necessarily from his making changes with his diet.  Will continue to monitor this.  As per weight management and endocrinology is considered a candidate to be placed on medication such as Mounjaro    Orders:  •  Ambulatory Referral to Weight Management; Future    Type 2 diabetes mellitus with hyperglycemia, without long-term current use of insulin (HCC)  As noted patient is showing difficulties with controlling his blood sugar readings.  Hemoglobin A1c is now up to 14.  Patient has not followed up with endocrinology and will not with weight management correspondence with weight management suggested the patient's start on Mounjaro I will place a consult for the patient to follow-up with their office to initiate treatment which will be helpful for control of blood sugars along with his other medication.  Given a slip to check on a fasting blood sugar hemoglobin A1c with his next visit.  Patient admits that he is more than more compliant as far as diet is concerned and as noted he has been losing some weight but this may be secondary to poor control of his blood sugars.  Lab Results   Component Value Date    HGBA1C 14.0 (H) 2025       Orders:  •  Ambulatory Referral to Weight Management; Future  •  Hemoglobin A1C; Future  •  Albumin / creatinine urine ratio; Future    Healthcare maintenance  Will be due for repeat Medicare wellness visit when he returns to the office in October.  Was  given a slip for complete labs to be performed prior to the visit.  He was told in the interim if any new medical problems or concerns to please call.    Orders:  •  Comprehensive metabolic panel; Future  •  CBC and differential; Future  •  Lipid panel; Future  •  Urinalysis with microscopic; Future  •  Hemoglobin A1C; Future  •  Albumin / creatinine urine ratio; Future  •  PSA, Total Screen; Future    Benign essential hypertension  Blood pressure is controlled.  Patient will continue present medication and will continue to monitor renal function.         Screening for prostate cancer    Orders:  •  PSA, Total Screen; Future    Mixed hyperlipidemia  History of hyperlipidemia.  Patient remains on fenofibrate with a history in the past of intolerance to statins.  Reminded the patient again the importance of watching his intake of fats and cholesterol with his diet.  Check a lipid profile with his next visit.              History of Present Illness     Patient is a 75-year-old male history of multiple medical problems previously who is here today for routine follow-up.  He did have labs performed prior to the visit and we did discuss the results at length with the patient.  Patient states he is healing relatively well.  He is very proud of the fact that he has lost some weight but we did review the results of recent blood testing shows poor control of his diabetes despite changes to medication with his last visit.  Patient has not followed up with weight management or with endocrinology  Review of Systems   Constitutional: Negative.    HENT: Negative.     Eyes: Negative.    Respiratory: Negative.     Cardiovascular: Negative.    Gastrointestinal: Negative.    Endocrine: Negative.    Genitourinary: Negative.    Musculoskeletal: Negative.    Skin: Negative.    Allergic/Immunologic: Negative.    Neurological: Negative.    Hematological: Negative.    Psychiatric/Behavioral: Negative.     Past Medical History[1]  Past  "Surgical History[2]  Family History[3]  Social History[4]  Medications[5]  No Known Allergies  Immunization History   Administered Date(s) Administered   • COVID-19 MODERNA VACC 0.5 ML IM 03/19/2021, 04/15/2021, 10/30/2021   • INFLUENZA 10/13/2022   • Influenza Quadrivalent Preservative Free 3 years and older IM 09/29/2014   • Influenza Quadrivalent, 6-35 Months IM 09/18/2015   • Influenza Split High Dose Preservative Free IM 10/19/2016, 09/15/2017, 10/08/2024   • Influenza, high dose seasonal 0.7 mL 10/07/2019, 11/13/2020, 10/21/2021, 10/13/2022, 09/27/2023   • Influenza, injectable, quadrivalent, preservative free 0.5 mL 09/29/2018   • Influenza, seasonal, injectable 09/20/2012, 09/24/2013   • Pneumococcal Conjugate 13-Valent 10/19/2016   • Zoster Vaccine Recombinant 01/05/2023, 03/10/2023     Objective   /70   Pulse 60   Temp 97.9 °F (36.6 °C)   Resp 16   Ht 5' 10\" (1.778 m)   Wt 89.8 kg (198 lb)   SpO2 95%   BMI 28.41 kg/m²     Physical Exam  Vitals and nursing note reviewed.   Constitutional:       General: He is not in acute distress.     Appearance: Normal appearance. He is not ill-appearing, toxic-appearing or diaphoretic.      Comments: Slightly anxious, overweight 75-year-old male who is awake alert in no acute distress oriented x 3, noncompliant   HENT:      Head: Normocephalic and atraumatic.      Right Ear: Tympanic membrane, ear canal and external ear normal. There is no impacted cerumen.      Left Ear: Tympanic membrane, ear canal and external ear normal. There is no impacted cerumen.      Nose: Nose normal. No congestion or rhinorrhea.      Mouth/Throat:      Mouth: Mucous membranes are dry.      Pharynx: Oropharynx is clear. No oropharyngeal exudate or posterior oropharyngeal erythema.     Eyes:      Extraocular Movements: Extraocular movements intact.      Conjunctiva/sclera: Conjunctivae normal.      Pupils: Pupils are equal, round, and reactive to light.     Neck:      Vascular: No " carotid bruit.     Cardiovascular:      Rate and Rhythm: Normal rate and regular rhythm.      Pulses: Normal pulses.      Heart sounds: Normal heart sounds. No murmur heard.     No friction rub. No gallop.   Pulmonary:      Effort: Pulmonary effort is normal. No respiratory distress.      Breath sounds: Normal breath sounds. No stridor. No wheezing, rhonchi or rales.   Chest:      Chest wall: No tenderness.   Abdominal:      General: Abdomen is flat. There is no distension.      Palpations: Abdomen is soft. There is no mass.      Tenderness: There is no abdominal tenderness. There is no right CVA tenderness, left CVA tenderness, guarding or rebound.      Hernia: No hernia is present.     Musculoskeletal:         General: No swelling, tenderness, deformity or signs of injury. Normal range of motion.      Cervical back: Normal range of motion and neck supple. No rigidity or tenderness.      Right lower leg: No edema.      Left lower leg: No edema.   Lymphadenopathy:      Cervical: No cervical adenopathy.     Skin:     General: Skin is warm and dry.      Capillary Refill: Capillary refill takes less than 2 seconds.      Coloration: Skin is not jaundiced or pale.      Findings: No bruising, erythema, lesion or rash.     Neurological:      General: No focal deficit present.      Mental Status: He is alert and oriented to person, place, and time. Mental status is at baseline.     Psychiatric:         Mood and Affect: Mood normal.         Behavior: Behavior normal.         Thought Content: Thought content normal.         Judgment: Judgment normal.      Comments: Fresh tenable thought process and judgment with not following up with specialist as noted            [1]  Past Medical History:  Diagnosis Date   • Colon polyp    • DM (diabetes mellitus), type 2 (HCC)    • Hypertension    [2]  Past Surgical History:  Procedure Laterality Date   • COLONOSCOPY     • KNEE SURGERY Right    [3]  Family History  Problem Relation Name  Age of Onset   • Diabetes unspecified Mother     [4]  Social History  Tobacco Use   • Smoking status: Never   • Smokeless tobacco: Never   Vaping Use   • Vaping status: Never Used   Substance and Sexual Activity   • Alcohol use: Yes     Alcohol/week: 3.0 standard drinks of alcohol     Types: 3 Cans of beer per week   • Drug use: Never   [5]  Current Outpatient Medications on File Prior to Visit   Medication Sig   • aspirin 81 mg chewable tablet Chew 1 tablet every other day   • bisoprolol-hydrochlorothiazide (ZIAC) 5-6.25 MG per tablet TAKE 1 TABLET DAILY   • citalopram (CeleXA) 10 mg tablet Take 1 tablet (10 mg total) by mouth daily   • fenofibrate (TRICOR) 145 mg tablet Take 1 tablet (145 mg total) by mouth daily   • glucose blood (ACCU-CHEK GUIDE) test strip Use to test blood sugars twice daily as instructed   • ketorolac (ACULAR) 0.5 % ophthalmic solution    • Lancets MISC Use in the morning and in the evening.   • losartan (COZAAR) 50 mg tablet TAKE 1 TABLET DAILY   • metFORMIN (GLUCOPHAGE-XR) 750 mg 24 hr tablet Take 1 tablet (750 mg total) by mouth 2 (two) times a day   • mupirocin (BACTROBAN) 2 % ointment Apply topically 3 (three) times a day   • nateglinide (STARLIX) 60 mg tablet Take 1 tablet (60 mg total) by mouth 3 (three) times a day before meals   • prednisoLONE acetate (PRED FORTE) 1 % ophthalmic suspension    • sitaGLIPtin (JANUVIA) 100 mg tablet Take 1 tablet (100 mg total) by mouth daily   • citalopram (CeleXA) 20 mg tablet Take 1 tablet (20 mg total) by mouth daily (Patient not taking: Reported on 3/24/2025)   • [DISCONTINUED] carbidopa-levodopa (SINEMET)  mg per tablet Take 1 tablet by mouth 2 (two) times a day (Patient taking differently: Take 1 tablet by mouth daily )

## 2025-07-07 NOTE — ASSESSMENT & PLAN NOTE
Will be due for repeat Medicare wellness visit when he returns to the office in October.  Was given a slip for complete labs to be performed prior to the visit.  He was told in the interim if any new medical problems or concerns to please call.    Orders:    Comprehensive metabolic panel; Future    CBC and differential; Future    Lipid panel; Future    Urinalysis with microscopic; Future    Hemoglobin A1C; Future    Albumin / creatinine urine ratio; Future    PSA, Total Screen; Future

## 2025-07-07 NOTE — ASSESSMENT & PLAN NOTE
History of hyperlipidemia.  Patient remains on fenofibrate with a history in the past of intolerance to statins.  Reminded the patient again the importance of watching his intake of fats and cholesterol with his diet.  Check a lipid profile with his next visit.

## 2025-07-07 NOTE — ASSESSMENT & PLAN NOTE
patient has been losing some weight.  Is no longer considered obese and more just overweight.  Again as noted we feel his weight loss may be secondary to his poor control of his blood sugars and not necessarily from his making changes with his diet.  Will continue to monitor this.  As per weight management and endocrinology is considered a candidate to be placed on medication such as Mounjaro    Orders:    Ambulatory Referral to Weight Management; Future

## 2025-07-07 NOTE — ASSESSMENT & PLAN NOTE
Blood pressure is controlled.  Patient will continue present medication and will continue to monitor renal function.

## 2025-07-07 NOTE — ASSESSMENT & PLAN NOTE
As noted patient is showing difficulties with controlling his blood sugar readings.  Hemoglobin A1c is now up to 14.  Patient has not followed up with endocrinology and will not with weight management correspondence with weight management suggested the patient's start on Mounjaro I will place a consult for the patient to follow-up with their office to initiate treatment which will be helpful for control of blood sugars along with his other medication.  Given a slip to check on a fasting blood sugar hemoglobin A1c with his next visit.  Patient admits that he is more than more compliant as far as diet is concerned and as noted he has been losing some weight but this may be secondary to poor control of his blood sugars.  Lab Results   Component Value Date    HGBA1C 14.0 (H) 06/17/2025       Orders:    Ambulatory Referral to Weight Management; Future    Hemoglobin A1C; Future    Albumin / creatinine urine ratio; Future

## 2025-08-06 PROBLEM — Z00.00 HEALTHCARE MAINTENANCE: Status: RESOLVED | Noted: 2018-05-22 | Resolved: 2025-08-06

## 2025-08-18 DIAGNOSIS — I10 ESSENTIAL HYPERTENSION: ICD-10-CM

## 2025-08-19 RX ORDER — LOSARTAN POTASSIUM 50 MG/1
50 TABLET ORAL DAILY
Qty: 90 TABLET | Refills: 1 | Status: SHIPPED | OUTPATIENT
Start: 2025-08-19